# Patient Record
Sex: MALE | Race: AMERICAN INDIAN OR ALASKA NATIVE | NOT HISPANIC OR LATINO | Employment: OTHER | URBAN - METROPOLITAN AREA
[De-identification: names, ages, dates, MRNs, and addresses within clinical notes are randomized per-mention and may not be internally consistent; named-entity substitution may affect disease eponyms.]

---

## 2017-06-23 ENCOUNTER — APPOINTMENT (OUTPATIENT)
Dept: RADIOLOGY | Facility: MEDICAL CENTER | Age: 77
DRG: 163 | End: 2017-06-23
Attending: INTERNAL MEDICINE
Payer: MEDICARE

## 2017-06-23 ENCOUNTER — HOSPITAL ENCOUNTER (OUTPATIENT)
Dept: RADIOLOGY | Facility: MEDICAL CENTER | Age: 77
End: 2017-06-23

## 2017-06-23 ENCOUNTER — RESOLUTE PROFESSIONAL BILLING HOSPITAL PROF FEE (OUTPATIENT)
Dept: HOSPITALIST | Facility: MEDICAL CENTER | Age: 77
End: 2017-06-23
Payer: MEDICARE

## 2017-06-23 ENCOUNTER — HOSPITAL ENCOUNTER (INPATIENT)
Facility: MEDICAL CENTER | Age: 77
LOS: 19 days | DRG: 163 | End: 2017-07-12
Attending: EMERGENCY MEDICINE | Admitting: HOSPITALIST
Payer: MEDICARE

## 2017-06-23 ENCOUNTER — APPOINTMENT (OUTPATIENT)
Dept: RADIOLOGY | Facility: MEDICAL CENTER | Age: 77
DRG: 163 | End: 2017-06-23
Attending: EMERGENCY MEDICINE
Payer: MEDICARE

## 2017-06-23 DIAGNOSIS — S01.81XA FACIAL LACERATION, INITIAL ENCOUNTER: ICD-10-CM

## 2017-06-23 DIAGNOSIS — R94.30 ABNORMAL EXERCISE TOLERANCE TEST: ICD-10-CM

## 2017-06-23 DIAGNOSIS — J96.01 ACUTE RESPIRATORY FAILURE WITH HYPOXIA (HCC): ICD-10-CM

## 2017-06-23 DIAGNOSIS — F10.921 ACUTE ALCOHOL INTOXICATION, WITH DELIRIUM (HCC): ICD-10-CM

## 2017-06-23 DIAGNOSIS — J96.00 ACUTE RESPIRATORY FAILURE, UNSPECIFIED WHETHER WITH HYPOXIA OR HYPERCAPNIA (HCC): ICD-10-CM

## 2017-06-23 DIAGNOSIS — J69.0 ASPIRATION PNEUMONITIS (HCC): ICD-10-CM

## 2017-06-23 DIAGNOSIS — E11.9 TYPE 2 DIABETES MELLITUS WITHOUT COMPLICATION, UNSPECIFIED LONG TERM INSULIN USE STATUS: ICD-10-CM

## 2017-06-23 DIAGNOSIS — G93.40 ENCEPHALOPATHY ACUTE: ICD-10-CM

## 2017-06-23 DIAGNOSIS — G93.40 ENCEPHALOPATHY: ICD-10-CM

## 2017-06-23 PROBLEM — J96.90 RESPIRATORY FAILURE (HCC): Status: ACTIVE | Noted: 2017-06-23

## 2017-06-23 PROBLEM — F10.10 ALCOHOL ABUSE: Status: ACTIVE | Noted: 2017-06-23

## 2017-06-23 LAB
ABO GROUP BLD: NORMAL
ABO GROUP BLD: NORMAL
ALBUMIN SERPL BCP-MCNC: 3.6 G/DL (ref 3.2–4.9)
ALBUMIN/GLOB SERPL: 1.7 G/DL
ALP SERPL-CCNC: 69 U/L (ref 30–99)
ALT SERPL-CCNC: 18 U/L (ref 2–50)
ANION GAP SERPL CALC-SCNC: 13 MMOL/L (ref 0–11.9)
APTT PPP: 26.3 SEC (ref 24.7–36)
AST SERPL-CCNC: 37 U/L (ref 12–45)
BASE EXCESS BLDA CALC-SCNC: -9 MMOL/L (ref -4–3)
BILIRUB SERPL-MCNC: 0.5 MG/DL (ref 0.1–1.5)
BLD GP AB SCN SERPL QL: NORMAL
BODY TEMPERATURE: ABNORMAL DEGREES
BUN SERPL-MCNC: 13 MG/DL (ref 8–22)
CALCIUM SERPL-MCNC: 8.1 MG/DL (ref 8.5–10.5)
CFT BLD TEG: 3.6 MIN (ref 5–10)
CHLORIDE SERPL-SCNC: 104 MMOL/L (ref 96–112)
CLOT ANGLE BLD TEG: 70.2 DEGREES (ref 53–72)
CLOT LYSIS 30M P MA LENFR BLD TEG: 0 % (ref 0–8)
CO2 BLDA-SCNC: 19 MMOL/L (ref 20–33)
CO2 SERPL-SCNC: 16 MMOL/L (ref 20–33)
CREAT SERPL-MCNC: 0.58 MG/DL (ref 0.5–1.4)
CT.EXTRINSIC BLD ROTEM: 1.2 MIN (ref 1–3)
ERYTHROCYTE [DISTWIDTH] IN BLOOD BY AUTOMATED COUNT: 48.7 FL (ref 35.9–50)
ETHANOL BLD-MCNC: 0.07 G/DL
GFR SERPL CREATININE-BSD FRML MDRD: >60 ML/MIN/1.73 M 2
GLOBULIN SER CALC-MCNC: 2.1 G/DL (ref 1.9–3.5)
GLUCOSE BLD-MCNC: 121 MG/DL (ref 65–99)
GLUCOSE BLD-MCNC: 131 MG/DL (ref 65–99)
GLUCOSE BLD-MCNC: 136 MG/DL (ref 65–99)
GLUCOSE SERPL-MCNC: 134 MG/DL (ref 65–99)
GRAM STN SPEC: NORMAL
HCO3 BLDA-SCNC: 17.6 MMOL/L (ref 17–25)
HCT VFR BLD AUTO: 33.2 % (ref 42–52)
HGB BLD-MCNC: 11.1 G/DL (ref 14–18)
INR PPP: 1.08 (ref 0.87–1.13)
LACTATE BLD-SCNC: 2.2 MMOL/L (ref 0.5–2)
LACTATE BLD-SCNC: 2.3 MMOL/L (ref 0.5–2)
LACTATE BLD-SCNC: 2.7 MMOL/L (ref 0.5–2)
LACTATE BLD-SCNC: 2.8 MMOL/L (ref 0.5–2)
LACTATE BLD-SCNC: 3.6 MMOL/L (ref 0.5–2)
MAGNESIUM SERPL-MCNC: 1.5 MG/DL (ref 1.5–2.5)
MCF BLD TEG: 70.4 MM (ref 50–70)
MCH RBC QN AUTO: 33.4 PG (ref 27–33)
MCHC RBC AUTO-ENTMCNC: 33.4 G/DL (ref 33.7–35.3)
MCV RBC AUTO: 100 FL (ref 81.4–97.8)
O2/TOTAL GAS SETTING VFR VENT: 40 %
PA AA BLD-ACNC: 94 %
PA ADP BLD-ACNC: 99.8 %
PCO2 BLDA: 39 MMHG (ref 26–37)
PH BLDA: 7.26 [PH] (ref 7.4–7.5)
PHOSPHATE SERPL-MCNC: 2.6 MG/DL (ref 2.5–4.5)
PLATELET # BLD AUTO: 162 K/UL (ref 164–446)
PMV BLD AUTO: 10.1 FL (ref 9–12.9)
PO2 BLDA: 106 MMHG (ref 64–87)
POTASSIUM SERPL-SCNC: 3.8 MMOL/L (ref 3.6–5.5)
PROCALCITONIN SERPL-MCNC: 0.33 NG/ML
PROT SERPL-MCNC: 5.7 G/DL (ref 6–8.2)
PROTHROMBIN TIME: 14.3 SEC (ref 12–14.6)
RBC # BLD AUTO: 3.32 M/UL (ref 4.7–6.1)
RH BLD: NORMAL
RHODAMINE-AURAMINE STN SPEC: NORMAL
SAO2 % BLDA: 97 % (ref 93–99)
SIGNIFICANT IND 70042: NORMAL
SIGNIFICANT IND 70042: NORMAL
SITE SITE: NORMAL
SITE SITE: NORMAL
SODIUM SERPL-SCNC: 133 MMOL/L (ref 135–145)
SOURCE SOURCE: NORMAL
SOURCE SOURCE: NORMAL
SPECIMEN DRAWN FROM PATIENT: ABNORMAL
TEG ALGORITHM TGALG: ABNORMAL
WBC # BLD AUTO: 20.8 K/UL (ref 4.8–10.8)

## 2017-06-23 PROCEDURE — 94640 AIRWAY INHALATION TREATMENT: CPT

## 2017-06-23 PROCEDURE — 87015 SPECIMEN INFECT AGNT CONCNTJ: CPT

## 2017-06-23 PROCEDURE — 5A1945Z RESPIRATORY VENTILATION, 24-96 CONSECUTIVE HOURS: ICD-10-PCS | Performed by: HOSPITALIST

## 2017-06-23 PROCEDURE — 99291 CRITICAL CARE FIRST HOUR: CPT | Mod: 25 | Performed by: INTERNAL MEDICINE

## 2017-06-23 PROCEDURE — 87205 SMEAR GRAM STAIN: CPT

## 2017-06-23 PROCEDURE — 700111 HCHG RX REV CODE 636 W/ 250 OVERRIDE (IP): Performed by: HOSPITALIST

## 2017-06-23 PROCEDURE — 85384 FIBRINOGEN ACTIVITY: CPT

## 2017-06-23 PROCEDURE — 86850 RBC ANTIBODY SCREEN: CPT

## 2017-06-23 PROCEDURE — 700105 HCHG RX REV CODE 258: Performed by: HOSPITALIST

## 2017-06-23 PROCEDURE — 700101 HCHG RX REV CODE 250: Performed by: INTERNAL MEDICINE

## 2017-06-23 PROCEDURE — 700111 HCHG RX REV CODE 636 W/ 250 OVERRIDE (IP): Performed by: INTERNAL MEDICINE

## 2017-06-23 PROCEDURE — 88112 CYTOPATH CELL ENHANCE TECH: CPT

## 2017-06-23 PROCEDURE — 99291 CRITICAL CARE FIRST HOUR: CPT

## 2017-06-23 PROCEDURE — HZ2ZZZZ DETOXIFICATION SERVICES FOR SUBSTANCE ABUSE TREATMENT: ICD-10-PCS | Performed by: HOSPITALIST

## 2017-06-23 PROCEDURE — 87206 SMEAR FLUORESCENT/ACID STAI: CPT

## 2017-06-23 PROCEDURE — 85730 THROMBOPLASTIN TIME PARTIAL: CPT

## 2017-06-23 PROCEDURE — 700111 HCHG RX REV CODE 636 W/ 250 OVERRIDE (IP): Performed by: EMERGENCY MEDICINE

## 2017-06-23 PROCEDURE — 82803 BLOOD GASES ANY COMBINATION: CPT

## 2017-06-23 PROCEDURE — 96365 THER/PROPH/DIAG IV INF INIT: CPT

## 2017-06-23 PROCEDURE — 85027 COMPLETE CBC AUTOMATED: CPT

## 2017-06-23 PROCEDURE — 82962 GLUCOSE BLOOD TEST: CPT

## 2017-06-23 PROCEDURE — 85347 COAGULATION TIME ACTIVATED: CPT

## 2017-06-23 PROCEDURE — 85610 PROTHROMBIN TIME: CPT

## 2017-06-23 PROCEDURE — 3E1F88Z IRRIGATION OF RESPIRATORY TRACT USING IRRIGATING SUBSTANCE, VIA NATURAL OR ARTIFICIAL OPENING ENDOSCOPIC: ICD-10-PCS | Performed by: INTERNAL MEDICINE

## 2017-06-23 PROCEDURE — 87186 SC STD MICRODIL/AGAR DIL: CPT

## 2017-06-23 PROCEDURE — 36415 COLL VENOUS BLD VENIPUNCTURE: CPT

## 2017-06-23 PROCEDURE — 87116 MYCOBACTERIA CULTURE: CPT

## 2017-06-23 PROCEDURE — 85576 BLOOD PLATELET AGGREGATION: CPT

## 2017-06-23 PROCEDURE — 700102 HCHG RX REV CODE 250 W/ 637 OVERRIDE(OP): Performed by: INTERNAL MEDICINE

## 2017-06-23 PROCEDURE — 87077 CULTURE AEROBIC IDENTIFY: CPT | Mod: 91

## 2017-06-23 PROCEDURE — 80307 DRUG TEST PRSMV CHEM ANLYZR: CPT

## 2017-06-23 PROCEDURE — 84100 ASSAY OF PHOSPHORUS: CPT

## 2017-06-23 PROCEDURE — 99292 CRITICAL CARE ADDL 30 MIN: CPT | Mod: 25 | Performed by: INTERNAL MEDICINE

## 2017-06-23 PROCEDURE — A9270 NON-COVERED ITEM OR SERVICE: HCPCS | Performed by: INTERNAL MEDICINE

## 2017-06-23 PROCEDURE — 86901 BLOOD TYPING SEROLOGIC RH(D): CPT

## 2017-06-23 PROCEDURE — 71010 DX-CHEST-LIMITED (1 VIEW): CPT

## 2017-06-23 PROCEDURE — 770022 HCHG ROOM/CARE - ICU (200)

## 2017-06-23 PROCEDURE — 83735 ASSAY OF MAGNESIUM: CPT

## 2017-06-23 PROCEDURE — 31645 BRNCHSC W/THER ASPIR 1ST: CPT | Performed by: INTERNAL MEDICINE

## 2017-06-23 PROCEDURE — 700102 HCHG RX REV CODE 250 W/ 637 OVERRIDE(OP): Performed by: HOSPITALIST

## 2017-06-23 PROCEDURE — 302978 HCHG BRONCHOSCOPY-DIAGNOSTIC

## 2017-06-23 PROCEDURE — 86900 BLOOD TYPING SEROLOGIC ABO: CPT

## 2017-06-23 PROCEDURE — 99291 CRITICAL CARE FIRST HOUR: CPT | Performed by: HOSPITALIST

## 2017-06-23 PROCEDURE — 87102 FUNGUS ISOLATION CULTURE: CPT

## 2017-06-23 PROCEDURE — 84145 PROCALCITONIN (PCT): CPT

## 2017-06-23 PROCEDURE — 94002 VENT MGMT INPAT INIT DAY: CPT

## 2017-06-23 PROCEDURE — 83605 ASSAY OF LACTIC ACID: CPT | Mod: 91

## 2017-06-23 PROCEDURE — 96366 THER/PROPH/DIAG IV INF ADDON: CPT

## 2017-06-23 PROCEDURE — 80053 COMPREHEN METABOLIC PANEL: CPT

## 2017-06-23 PROCEDURE — 87070 CULTURE OTHR SPECIMN AEROBIC: CPT

## 2017-06-23 PROCEDURE — G0390 TRAUMA RESPONS W/HOSP CRITI: HCPCS

## 2017-06-23 PROCEDURE — 88305 TISSUE EXAM BY PATHOLOGIST: CPT

## 2017-06-23 PROCEDURE — A9270 NON-COVERED ITEM OR SERVICE: HCPCS | Performed by: HOSPITALIST

## 2017-06-23 PROCEDURE — 96368 THER/DIAG CONCURRENT INF: CPT

## 2017-06-23 PROCEDURE — 94760 N-INVAS EAR/PLS OXIMETRY 1: CPT

## 2017-06-23 RX ORDER — FOLIC ACID 1 MG/1
1 TABLET ORAL DAILY
Status: DISCONTINUED | OUTPATIENT
Start: 2017-06-23 | End: 2017-07-12 | Stop reason: HOSPADM

## 2017-06-23 RX ORDER — SIMVASTATIN 40 MG
40 TABLET ORAL EVERY EVENING
COMMUNITY

## 2017-06-23 RX ORDER — IPRATROPIUM BROMIDE AND ALBUTEROL SULFATE 2.5; .5 MG/3ML; MG/3ML
3 SOLUTION RESPIRATORY (INHALATION)
Status: DISCONTINUED | OUTPATIENT
Start: 2017-06-23 | End: 2017-06-24

## 2017-06-23 RX ORDER — ASPIRIN 81 MG/1
81 TABLET, CHEWABLE ORAL 2 TIMES DAILY
COMMUNITY

## 2017-06-23 RX ORDER — SODIUM CHLORIDE 9 MG/ML
30 INJECTION, SOLUTION INTRAVENOUS
Status: COMPLETED | OUTPATIENT
Start: 2017-06-23 | End: 2017-06-23

## 2017-06-23 RX ORDER — TAMSULOSIN HYDROCHLORIDE 0.4 MG/1
0.4 CAPSULE ORAL
COMMUNITY

## 2017-06-23 RX ORDER — SODIUM CHLORIDE 9 MG/ML
INJECTION, SOLUTION INTRAVENOUS CONTINUOUS
Status: DISCONTINUED | OUTPATIENT
Start: 2017-06-23 | End: 2017-06-27

## 2017-06-23 RX ORDER — MAGNESIUM SULFATE HEPTAHYDRATE 40 MG/ML
4 INJECTION, SOLUTION INTRAVENOUS ONCE
Status: COMPLETED | OUTPATIENT
Start: 2017-06-23 | End: 2017-06-23

## 2017-06-23 RX ORDER — BISACODYL 10 MG
10 SUPPOSITORY, RECTAL RECTAL
Status: DISCONTINUED | OUTPATIENT
Start: 2017-06-23 | End: 2017-07-12 | Stop reason: HOSPADM

## 2017-06-23 RX ORDER — BISACODYL 10 MG
10 SUPPOSITORY, RECTAL RECTAL
Status: DISCONTINUED | OUTPATIENT
Start: 2017-06-23 | End: 2017-06-23

## 2017-06-23 RX ORDER — THIAMINE MONONITRATE (VIT B1) 100 MG
100 TABLET ORAL DAILY
Status: DISCONTINUED | OUTPATIENT
Start: 2017-06-23 | End: 2017-07-12 | Stop reason: HOSPADM

## 2017-06-23 RX ORDER — POLYETHYLENE GLYCOL 3350 17 G/17G
1 POWDER, FOR SOLUTION ORAL
Status: DISCONTINUED | OUTPATIENT
Start: 2017-06-23 | End: 2017-07-12 | Stop reason: HOSPADM

## 2017-06-23 RX ORDER — PANTOPRAZOLE SODIUM 40 MG/1
40 TABLET, DELAYED RELEASE ORAL DAILY
COMMUNITY

## 2017-06-23 RX ORDER — CHLORHEXIDINE GLUCONATE ORAL RINSE 1.2 MG/ML
15 SOLUTION DENTAL 2 TIMES DAILY
Status: DISCONTINUED | OUTPATIENT
Start: 2017-06-23 | End: 2017-07-03

## 2017-06-23 RX ORDER — AMOXICILLIN 250 MG
2 CAPSULE ORAL 2 TIMES DAILY
Status: DISCONTINUED | OUTPATIENT
Start: 2017-06-23 | End: 2017-06-23

## 2017-06-23 RX ORDER — AMOXICILLIN 250 MG
2 CAPSULE ORAL 2 TIMES DAILY
Status: DISCONTINUED | OUTPATIENT
Start: 2017-06-23 | End: 2017-07-12 | Stop reason: HOSPADM

## 2017-06-23 RX ORDER — LIDOCAINE HYDROCHLORIDE 10 MG/ML
1-2 INJECTION, SOLUTION INFILTRATION; PERINEURAL
Status: DISCONTINUED | OUTPATIENT
Start: 2017-06-23 | End: 2017-07-04

## 2017-06-23 RX ORDER — BACILLUS COAGULANS/LACTASE 500MM-3000
CAPSULE ORAL
COMMUNITY

## 2017-06-23 RX ORDER — SODIUM CHLORIDE 9 MG/ML
500 INJECTION, SOLUTION INTRAVENOUS
Status: COMPLETED | OUTPATIENT
Start: 2017-06-23 | End: 2017-06-23

## 2017-06-23 RX ORDER — FINASTERIDE 5 MG/1
5 TABLET, FILM COATED ORAL DAILY
COMMUNITY

## 2017-06-23 RX ORDER — FAMOTIDINE 20 MG/1
20 TABLET, FILM COATED ORAL EVERY 12 HOURS
Status: DISCONTINUED | OUTPATIENT
Start: 2017-06-23 | End: 2017-06-26

## 2017-06-23 RX ORDER — QUININE SULFATE 324 MG/1
324 CAPSULE ORAL EVERY EVENING
COMMUNITY

## 2017-06-23 RX ORDER — DEXTROSE MONOHYDRATE 25 G/50ML
25 INJECTION, SOLUTION INTRAVENOUS
Status: DISCONTINUED | OUTPATIENT
Start: 2017-06-23 | End: 2017-07-05

## 2017-06-23 RX ORDER — METFORMIN HYDROCHLORIDE 500 MG/1
500 TABLET, EXTENDED RELEASE ORAL DAILY
COMMUNITY

## 2017-06-23 RX ORDER — GLIPIZIDE 2.5 MG/1
2.5 TABLET, EXTENDED RELEASE ORAL DAILY
COMMUNITY

## 2017-06-23 RX ORDER — POLYETHYLENE GLYCOL 3350 17 G/17G
1 POWDER, FOR SOLUTION ORAL
Status: DISCONTINUED | OUTPATIENT
Start: 2017-06-23 | End: 2017-06-23

## 2017-06-23 RX ORDER — SODIUM PHOSPHATE,MONO-DIBASIC 19G-7G/118
500 ENEMA (ML) RECTAL
COMMUNITY

## 2017-06-23 RX ADMIN — FAMOTIDINE 20 MG: 20 TABLET, FILM COATED ORAL at 21:12

## 2017-06-23 RX ADMIN — AMPICILLIN SODIUM AND SULBACTAM SODIUM 3 G: 2; 1 INJECTION, POWDER, FOR SOLUTION INTRAMUSCULAR; INTRAVENOUS at 18:14

## 2017-06-23 RX ADMIN — SODIUM CHLORIDE 2508 ML: 9 INJECTION, SOLUTION INTRAVENOUS at 07:36

## 2017-06-23 RX ADMIN — IPRATROPIUM BROMIDE AND ALBUTEROL SULFATE 3 ML: .5; 3 SOLUTION RESPIRATORY (INHALATION) at 13:41

## 2017-06-23 RX ADMIN — PROPOFOL 50 MCG/KG/MIN: 10 INJECTION, EMULSION INTRAVENOUS at 15:21

## 2017-06-23 RX ADMIN — ENOXAPARIN SODIUM 40 MG: 100 INJECTION SUBCUTANEOUS at 08:09

## 2017-06-23 RX ADMIN — IPRATROPIUM BROMIDE AND ALBUTEROL SULFATE 3 ML: .5; 3 SOLUTION RESPIRATORY (INHALATION) at 22:26

## 2017-06-23 RX ADMIN — PROPOFOL 60 MCG/KG/MIN: 10 INJECTION, EMULSION INTRAVENOUS at 10:58

## 2017-06-23 RX ADMIN — AMPICILLIN SODIUM AND SULBACTAM SODIUM 3 G: 2; 1 INJECTION, POWDER, FOR SOLUTION INTRAMUSCULAR; INTRAVENOUS at 12:32

## 2017-06-23 RX ADMIN — THERA TABS 1 TABLET: TAB at 10:58

## 2017-06-23 RX ADMIN — PROPOFOL 15 MCG/KG/MIN: 10 INJECTION, EMULSION INTRAVENOUS at 04:18

## 2017-06-23 RX ADMIN — PROPOFOL 35 MCG/KG/MIN: 10 INJECTION, EMULSION INTRAVENOUS at 08:01

## 2017-06-23 RX ADMIN — SODIUM CHLORIDE: 9 INJECTION, SOLUTION INTRAVENOUS at 06:09

## 2017-06-23 RX ADMIN — IPRATROPIUM BROMIDE AND ALBUTEROL SULFATE 3 ML: .5; 3 SOLUTION RESPIRATORY (INHALATION) at 18:53

## 2017-06-23 RX ADMIN — SENNOSIDES AND DOCUSATE SODIUM 2 TABLET: 8.6; 5 TABLET ORAL at 21:12

## 2017-06-23 RX ADMIN — IPRATROPIUM BROMIDE AND ALBUTEROL SULFATE 3 ML: .5; 3 SOLUTION RESPIRATORY (INHALATION) at 07:33

## 2017-06-23 RX ADMIN — MAGNESIUM SULFATE HEPTAHYDRATE 4 G: 40 INJECTION, SOLUTION INTRAVENOUS at 13:33

## 2017-06-23 RX ADMIN — FOLIC ACID 1 MG: 1 TABLET ORAL at 10:58

## 2017-06-23 RX ADMIN — AMPICILLIN SODIUM AND SULBACTAM SODIUM 3 G: 2; 1 INJECTION, POWDER, FOR SOLUTION INTRAMUSCULAR; INTRAVENOUS at 23:49

## 2017-06-23 RX ADMIN — AMPICILLIN SODIUM AND SULBACTAM SODIUM 3 G: 2; 1 INJECTION, POWDER, FOR SOLUTION INTRAMUSCULAR; INTRAVENOUS at 06:08

## 2017-06-23 RX ADMIN — CHLORHEXIDINE GLUCONATE 15 ML: 1.2 RINSE ORAL at 08:04

## 2017-06-23 RX ADMIN — Medication 100 MG: at 10:58

## 2017-06-23 RX ADMIN — CHLORHEXIDINE GLUCONATE 15 ML: 1.2 RINSE ORAL at 21:12

## 2017-06-23 RX ADMIN — IPRATROPIUM BROMIDE AND ALBUTEROL SULFATE 3 ML: .5; 3 SOLUTION RESPIRATORY (INHALATION) at 10:35

## 2017-06-23 RX ADMIN — SODIUM CHLORIDE 500 ML: 9 INJECTION, SOLUTION INTRAVENOUS at 10:57

## 2017-06-23 RX ADMIN — FENTANYL CITRATE 25 MCG/HR: 50 INJECTION, SOLUTION INTRAMUSCULAR; INTRAVENOUS at 08:15

## 2017-06-23 RX ADMIN — FAMOTIDINE 20 MG: 10 INJECTION INTRAVENOUS at 08:04

## 2017-06-23 RX ADMIN — PROPOFOL 45 MCG/KG/MIN: 10 INJECTION, EMULSION INTRAVENOUS at 19:36

## 2017-06-23 RX ADMIN — SODIUM CHLORIDE: 9 INJECTION, SOLUTION INTRAVENOUS at 15:22

## 2017-06-23 ASSESSMENT — LIFESTYLE VARIABLES
DO YOU DRINK ALCOHOL: YES
EVER_SMOKED: YES
REASON UNABLE TO ASSESS: CURRENTLY INTUBATED

## 2017-06-23 ASSESSMENT — PATIENT HEALTH QUESTIONNAIRE - PHQ9
1. LITTLE INTEREST OR PLEASURE IN DOING THINGS: NOT AT ALL
SUM OF ALL RESPONSES TO PHQ QUESTIONS 1-9: 0
2. FEELING DOWN, DEPRESSED, IRRITABLE, OR HOPELESS: NOT AT ALL
SUM OF ALL RESPONSES TO PHQ9 QUESTIONS 1 AND 2: 0

## 2017-06-23 NOTE — IP AVS SNAPSHOT
BitX Access Code: Activation code not generated  Current BitX Status: Active    [employer]  A secure, online tool to manage your health information     [employer]’s BitX® is a secure, online tool that connects you to your personalized health information from the privacy of your home -- day or night - making it very easy for you to manage your healthcare. Once the activation process is completed, you can even access your medical information using the BitX abby, which is available for free in the Apple Abby store or Google Play store.     BitX provides the following levels of access (as shown below):   My Chart Features   [facility] Primary Care Doctor [facility]  Specialists [facility]  Urgent  Care Non-[facility]  Primary Care  Doctor   Email your healthcare team securely and privately 24/7 X X X X   Manage appointments: schedule your next appointment; view details of past/upcoming appointments X      Request prescription refills. X      View recent personal medical records, including lab and immunizations X X X X   View health record, including health history, allergies, medications X X X X   Read reports about your outpatient visits, procedures, consult and ER notes X X X X   See your discharge summary, which is a recap of your hospital and/or ER visit that includes your diagnosis, lab results, and care plan. X X       How to register for BitX:  1. Go to  [email or web address].  2. Click on the Sign Up Now box, which takes you to the New Member Sign Up page. You will need to provide the following information:  a. Enter your BitX Access Code exactly as it appears at the top of this page. (You will not need to use this code after you’ve completed the sign-up process. If you do not sign up before the expiration date, you must request a new code.)   b. Enter your date of birth.   c. Enter your home email address.   d. Click Submit, and follow the next screen’s instructions.  3. Create a BitX ID. This will  be your [employer] login ID and cannot be changed, so think of one that is secure and easy to remember.  4. Create a [employer]agility password. You can change your password at any time.  5. Enter your Password Reset Question and Answer. This can be used at a later time if you forget your password.   6. Enter your e-mail address. This allows you to receive e-mail notifications when new information is available in [employer].  7. Click Sign Up. You can now view your health information.    For assistance activating your Pathagility account, call [phone]

## 2017-06-23 NOTE — ED NOTES
Broncoscopy performed by Dr. Simmons with respiratory and RN at bedside.  Propofol infusing at 45 mcg/kg/min.  VSS at this time.

## 2017-06-23 NOTE — CONSULTS
"Trauma Consult  6/23/2017    Attending Physician: Devang Gaspar MD.     CC: Trauma The patient was triaged as a Trauma Red Transfer in accordance with established pre hospital protols. An expeditious primary and secondary survey with required adjuncts was conducted. See Trauma Narrator for full details.    HPI: This is a 78 yo male who apparently tripped and fell while drunk earlier tonight, sustaining a lip laceration. He was taken to a local hospital in [place], [place] and had a negative CT head and neck. The plan was apparently to repair his lip laceration and send him home but he apparently aspirated blood while supine and intoxicated in the ER getting his lip sutured. He was intubated and [place] [facility] [place] Stable vitals en route.     PMHx, PSHx, outpatient meds, social hx, ROS, family hx all unobtainable due to his intubated state.           Physical Exam:  Blood pressure 130/62, pulse 90, resp. rate 22, height 1.803 m (5' 11\"), weight 83.6 kg (184 lb 4.9 oz), SpO2 99 %.    Constitutional: Intubated, sedated. Responds to painful stimuli with localization. GCS 5T while on propofol. E1 V1T M3  Head: No cephalohematoma. Pupils 4-3 reactive bilaterally. Midface stable. No malocclusion.  TMs clear bilaterally. Sutured lip laceration.  Neck: No tracheal deviation. No midline cervical spine tenderness. C-collar in place. No cervical seatbelt sign.  Cardiovascular: Normal rate, regular rhythm, normal heart sounds and intact distal pulses.  Exam reveals no gallop and no friction rub.  No murmur heard.  Pulmonary/Chest: Clavicles nontender to palpation. There is not any chest wall tenderness bilaterally.  No crepitus. Positive breath sounds bilaterally.   Abdominal: Soft, nondistended. Nontender to palpation. Pelvis is stable to anterior-posterior compression. No abdominal seatbelt sign.   Musculoskeletal: Right upper extremity grossly atraumatic, palpable radial pulse.   Left upper extremity grossly " "atraumatic, palpable radial pulse.   Right lower extremity grossly atraumatic.   Left  lower extremity grossly atraumatic.    Back: Midline thoracic and lumbar spines are nontender to palpation. No step-offs. Mild sacral erythema present.  : Normal male external genitalia. Rectal exam not done. No blood visible at urethral meatus. Herring in place.  Neurological: Unable to assess intubated.   Skin: Skin is warm and dry.  No diaphoresis. No erythema. No pallor.   Psychiatric:  Unable to assess        Labs:                    Radiology:  DX-CHEST-LIMITED (1 VIEW)   Preliminary Result      OUTSIDE IMAGES-CT CERVICAL SPINE   Preliminary Result      OUTSIDE IMAGES-CT FACE   Preliminary Result      OUTSIDE IMAGES-CT HEAD   Preliminary Result      OUTSIDE IMAGES-DX CHEST   Preliminary Result      OUTSIDE IMAGES-DX CHEST   Preliminary Result            Assessment: This is a 76 yo male with no apparent major injures who aspirated blood.    Plan: Medical admission.   I reviewed his imaging from referring. His C collar may be removed and no further work up is indicated for the CT findings of a questionable \"T1 wedging\"         Time spent: 35 minutes    Devang Gaspar MD  [facility]  [phone]      "

## 2017-06-23 NOTE — PROGRESS NOTES
Pulmonary Critical Care Progress Note        Date of service: 6/23/2017    Chief Complaint: GLF and ETOH    History of Present Illness:   This is a 77-year-old male admitted to the ICU through the ED on 6/23/17 for acute alcohol intoxication and ground level fall. Patient intubated in the ED.       ROS:  Respiratory: unable to perform due to the patient's inability to effectively communicate, Cardiac: unable to perform due to the patient's inability to effectively communicate, GI: unable to perform due to the patient's inability to effectively communicate.  All other systems negative.      Interval Events:  24 hour interval history reviewed   Admitted overnight.   Follows commands and moves all extremities.   Pupils are equal and sluggish. On Prop and Fent gtts.   SR,  systolic   225 cc UOP last 2 hours   CXR shows hyperexpanded lungs with clear lung fields.       PFSH:  No change.      Physical Exam  General:  Sedate, arouses, int agitated,   Neuro/Psych: moves all ext; int following  HEENT: PERRL, MMM  CVS: RRR  Respiratory: Coarse, no wheeze  Abdomen/:soft, nt; carlos TF  Extremities: no CCE  Skin: cool, dry, no rash      Respiratory:  [place]al Vent Mode: APVCMV, Rate (breaths/min): 20, Vt Target (mL): 460, PEEP/CPAP: 8, FiO2: 40, Static Compliance (ml / cm H2O): 43, Control VTE (exp VT): 459  Pulse Oximetry: 99 %  ImagingAvailable data reviewed     Recent Labs      06/23/17   0423   ISTATAPH  7.264*   ISTATAPCO2  39.0*   ISTATAPO2  106*   ISTATATCO2  19*   [id number]  97   ISTATARTHCO3  17.6   ISTATARTBE  -9*   ISTATTEMP  see below   ISTATFIO2  40   ISTATSPEC  Arterial       HemoDynamics:  Pulse: 76, Heart Rate (Monitored): 76  Blood Pressure : 130/62 mmHg, NIBP: 100/52 mmHg     Imaging: Available data reviewed      Neuro:  GCS Total [place] Coma Score: 7  Imaging: Available data reviewed      Fluids:  Intake/Output       06/21/17 0700 - 06/22/17 0659 (Not Admitted) 06/22/17 0700 - 06/23/17 0659 (Not  Admitted) 17 07 - 17 0659      1900-0659 Total [id number] Total  Total       Intake    I.V.  --  -- --  --  20006.8  -- .8    Pre-Hospital Volume -- -- -- -- 2000 -- -- --    Trauma Resuscitation Volume -- -- -- -- 0 0 -- -- --    Propofol Volume -- -- -- -- -- -- 66.8 -- 66.8    IV Volume (Normal saline) -- -- -- -- -- -- 2000    Blood  --  -- --  --  0 0  --  -- --    PRBC Total Volume (Non-Barcoded) -- -- -- -- 0 0 -- -- --    FFP Total Volume (Non-Barcoded) -- -- -- -- 0 0 -- -- --    Platelets Total Volume (Non-Barcoded) -- -- -- -- 0 0 -- -- --    Cryoprecipitate (Pooled) Total Volume (Non-Barcoded) -- -- -- -- 0 0 -- -- --    Cryoprecipitate (Single) Total Volume (Non-Barcoded) -- -- -- -- 0 0 -- -- --    Total Intake -- -- -- -- 2000.8 -- .8       Output    Urine  --  -- --  --  -- --  225  -- 225    Indwelling Cathether -- -- -- -- -- -- 225 -- 225    Other  --  -- --  --  0 0  --  -- --    Pre-Hospital Output -- -- -- -- 0 0 -- -- --    Trauma Resuscitation Output -- -- -- -- 0 0 -- -- --    Blood  --  -- --  --  0 0  --  -- --    Est. Blood Loss (mL) -- -- -- -- 0 0 -- -- --    Total Output -- -- -- -- 0 0 225 -- 225       Net I/O     -- -- -- -- 2000 1841.8 -- 1841.8        Weight: 83.6 kg (184 lb 4.9 oz)  Recent Labs      17   SODIUM  133*   POTASSIUM  3.8   CHLORIDE  104   CO2  16*   BUN  13   CREATININE  0.58   CALCIUM  8.1*       GI/Nutrition:  Imaging: Available data reviewed  NPO       Liver Function  Recent Labs      17   ALTSGPT  18   ASTSGOT  37   ALKPHOSPHAT  69   TBILIRUBIN  0.5   GLUCOSE  134*       Heme:  Recent Labs      17   RBC  3.32*   HEMOGLOBIN  11.1*   HEMATOCRIT  33.2*   PLATELETCT  162*   PROTHROMBTM  14.3   APTT  26.3   INR  1.08       Infectious Disease:  Temp  Av.4 °C (97.6 °F)  Min: 36.2 °C (97.2 °F)  Max: 36.7 °C (98 °F)  Micro: reviewed      Recent Labs      06/23/17   0421   WBC  20.8*   ASTSGOT  37   ALTSGPT  18   ALKPHOSPHAT  69   TBILIRUBIN  0.5     Current Facility-Administered Medications   Medication Dose Frequency Provider Last Rate Last Dose   • insulin lispro (HUMALOG) injection 2-9 Units  2-9 Units Q6HRS Tunde Panchal M.D.   Stopped at 06/23/17 0802   • NS (BOLUS) infusion 500 mL  500 mL Once PRN Tunde Panchal M.D.       • NS infusion   Continuous Tunde Panchal M.D. 100 mL/hr at 06/23/17 0609     • ampicillin/sulbactam (UNASYN) 3 g in  mL IVPB  3 g Q6HRS Tunde Panchal M.D.   Stopped at 06/23/17 0638   • glucose 4 g chewable tablet 16 g  16 g Q15 MIN PRN Kaya Gonzalez, PHARMD       • dextrose 50% (D50W) injection 25 mL  25 mL Q15 MIN PRN Kaya Gonzalez, PHARMD       • thiamine (THIAMINE) tablet 100 mg  100 mg DAILY Tunde Panchal M.D.       • folic acid (FOLVITE) tablet 1 mg  1 mg DAILY Tudne Panchal M.D.       • multivitamin (THERAGRAN) tablet 1 Tab  1 Tab DAILY Pedro Vivas M.D.       • Respiratory Care per Protocol   Continuous RT Pedro Vivas M.D.       • senna-docusate (PERICOLACE or SENOKOT S) 8.6-50 MG per tablet 2 Tab  2 Tab BID Pedro Vivas M.D.        And   • polyethylene glycol/lytes (MIRALAX) PACKET 1 Packet  1 Packet QDAY PRN Pedro Vivas M.D.        And   • magnesium hydroxide (MILK OF MAGNESIA) suspension 30 mL  30 mL QDAY PRN Pedro Vivas M.D.        And   • bisacodyl (DULCOLAX) suppository 10 mg  10 mg QDAY PRN Pedro Vivas M.D.       • chlorhexidine (PERIDEX) 0.12 % solution 15 mL  15 mL BID Pedro Vivas M.D.   15 mL at 06/23/17 0804   • lidocaine (XYLOCAINE) 1%  injection  1-2 mL Q30 MIN PRN Pedro Vivas M.D.       • MD ALERT...Adult ICU Electrolyte Replacement per Pharmacy Protocol   pharmacy to dose Pedro Vivas M.D.       • enoxaparin (LOVENOX) inj 40 mg  40 mg DAILY Pedro Vivas M.D.   40 mg at  06/23/17 0809   • fentaNYL (SUBLIMAZE) injection 25 mcg  25 mcg Q HOUR PRN Pedro Vivas M.D.        Or   • fentaNYL (SUBLIMAZE) injection 50 mcg  50 mcg Q HOUR PRN Pedro Vivas M.D.        Or   • fentaNYL (SUBLIMAZE) injection 100 mcg  100 mcg Q HOUR PRN Pedro Vivas M.D.       • fentaNYL (SUBLIMAZE) 50 mcg/mL in 50mL  0-200 mcg/hr Continuous Pedro Vivas M.D. 1 mL/hr at 06/23/17 0831 50 mcg/hr at 06/23/17 0831   • ipratropium-albuterol (DUONEB) nebulizer solution 3 mL  3 mL Q2HRS PRN (RT) Pedro Vivas M.D.       • ipratropium-albuterol (DUONEB) nebulizer solution 3 mL  3 mL Q4HRS (RT) Pedro Vivas M.D.   3 mL at 06/23/17 0733   • famotidine (PEPCID) tablet 20 mg  20 mg Q12HRS Pedro Vivas M.D.        Or   • famotidine (PEPCID) injection 20 mg  20 mg Q12HRS Pedro Vivas M.D.   20 mg at 06/23/17 0804   • propofol (DIPRIVAN) injection  5-80 mcg/kg/min Continuous Pedro Vivas M.D. 30.1 mL/hr at 06/23/17 0917 60 mcg/kg/min at 06/23/17 0917     Last reviewed on 6/23/2017  5:03 AM by Jessica Artis R.N.    Quality  Measures:  Labs reviewed, Medications reviewed and Radiology images reviewed                      Assessment and Plan:  Ventilator-dependent respiratory failure.  Status post ground level fall with facial trauma   - 3 cm laceration to his lip, which was sutured in [place], [place]   - seen by trauma and cleared   Status post aspiration.   - empiric abx; f/u cxr  Acute alcohol intoxication with blood alcohol level of 0.206 in [place] at the time of presentation.   - sedation; monitor for w/d   - follow/correct lytes   - rally vits  Alcohol abuse.  Acute blood loss anemia.   - follow HH  Hyponatremia.  Incomplete historical database secondary to inability to interview the patient.  Presumed chronic lung disease based upon his medication list.  Presumed dyslipidemia.  Presumed diabetes mellitus   - glycemic  control      Discussed patient condition and risk of morbidity and/or mortality with RN, RT, Pharmacy and Charge nurse / hot rounds.    The patient remains critically ill.  Critical care time = 39 minutes in directly providing and coordinating critical care and extensive data review.  No time overlap and excludes procedures.      Caremn SOTOMAYOR (Alison), am scribing for, and in the presence of, Gunnar Bryan M.D.  Electronically signed by: Carmen Maciel (Alison), 6/23/2017  Gunnar SOTOMAYOR M.D. personally performed the services described in this documentation, as scribed by Carmen Maciel in my presence, and it is both accurate and complete.

## 2017-06-23 NOTE — PROGRESS NOTES
Assumed care of patient, bedside report completed. Two RN skin check completed. Patient brought up in C-spine precautions in place, will call MD Gaspar regarding questions about what kind of spinal precautions to keep patient on.

## 2017-06-23 NOTE — CARE PLAN
Problem: Bronchoconstriction:  Goal: Improve in air movement and diminished wheezing  Outcome: PROGRESSING SLOWER THAN EXPECTED  Pt on Q4 TX's.    Problem: Ventilation Defect:  Goal: Ability to achieve and maintain unassisted ventilation or tolerate decreased levels of ventilator support  Outcome: PROGRESSING SLOWER THAN EXPECTED  Adult Ventilation Update    Total Vent Days: 1      Patient Lines/Drains/Airways Status    Active Airway      Name: Placement date: Placement time: Site: Days:     Airway Group ET Tube Oral 7.5     Oral  1                  Plateau Pressure (Q Shift): 20 (06/23/17 1342)  Static Compliance (ml / cm H2O): 47 (06/23/17 1342)    Patient failed trials because of Barriers to Wean: Other (Comments) (Pt to critical) (06/23/17 1342)    Mobility Group  Pt Calls for Assistance: No (06/23/17 0800)    Events/Summary/Plan: Pt arrived to unit intubated, and placed on vent. No SBT's or mobilization today. Will continue to monitor.

## 2017-06-23 NOTE — CONSULTS
DATE OF SERVICE:  06/23/2017    REQUESTING PHYSICIAN:  Ashish Carty MD    CONSULTING PHYSICIAN:  Pedro Simmons MD    TYPE OF CONSULTATION:  Pulmonary medicine and critical care medicine.    REASON FOR CONSULTATION:  Evaluation and management of ventilator-dependent   respiratory failure and aspiration.    CHIEF COMPLAINT:  The patient cannot provide.    HISTORY OF PRESENT ILLNESS:  The entire history is obtained from healthcare   providers and medical record as this gentleman cannot give me any history.  I   was kindly asked by Dr. Carty to see and evaluate this gentleman regarding   the above problems.  This is a 77-year-old gentleman who was transferred here   from [facility] in [place], [place].  His name is Xavier Bynum.  He apparently presented to the hospital in [place] acutely   intoxicated with a blood alcohol level of 0.206.  He apparently suffered a   fall with some facial trauma.  He presented with oral bleeding.  He was   hypoxemic with a lot of blood in his mouth at the time of this presentation to   the emergency room in [place].  He was sedated, paralyzed, and intubated.  He   had a 3 cm laceration to his lip, which was repaired by the physician in   [place], [place].  It is suspected that he aspirated.  He had a lot of blood   in his mouth and evidence of aspirated blood suctioned from his endotracheal   tube.  He was then transferred to [facility] for   subspecialty care.  He is now intubated and sedated on the ventilator in the   emergency room.    CURRENT MEDICATIONS:  Records from [place] indicate that he takes albuterol,   aspirin, Protonix, ascorbic acid, quinine, simvastatin, Advair, Combivent,   meloxicam, finasteride, metformin, Wellbutrin, and Norco as well as Flomax.    ALLERGIES:  No known drug allergies.    PAST MEDICAL HISTORY:  It is unknown what history this gentleman has.  I   presume based upon his medications that he has lung  disease such as asthma or   COPD as well as dyslipidemia and diabetes mellitus.    SOCIAL HISTORY, FAMILY HISTORY AND REVIEW OF SYSTEMS:  Not obtainable.    PHYSICAL EXAMINATION:  VITAL SIGNS:  His temperature is 36.2, blood pressure 130/62, heart rate 86,   respiratory rate is 16.  GENERAL:  He is a sedated man on the ventilator.  HEENT:  His head is normocephalic and endotracheal tube is in place.  There is   dried blood on his face, lips and in his mouth.  He has moist mucous   membranes.  NECK:  Trachea midline, supple.  CHEST:  Symmetrical.  HEART:  Regular rhythm.  LUNGS:  There are scattered coarse crackles bilaterally.  ABDOMEN:  Soft, nondistended, nontender.  EXTREMITIES:  No clubbing or cyanosis.  NEUROLOGIC:  He is sedated.    DIAGNOSTIC DATA:  His white blood cell count is 20,800, hemoglobin 11.1,   hematocrit 33.2, platelet count 162,000.  Sodium 133, potassium 3.8, chloride   104, CO2 16, BUN 13, creatinine 0.58, glucose 134.  Lactic acid 3.6.  His   diagnostic alcohol here at [facility] is 0.07.  In [place], it was 0.206.  His INR   is 1.08.  Arterial blood gas shows a pH of 7.26, pCO2 of 39, pO2 of 106.  His   chest x-ray shows faint bibasilar opacities.  A CT scan of the cervical spine   in [place] shows anterior wedging at T1 of uncertain age and etiology.  No   cervical spine fracture was noted.  CT scan of the head showed no acute   intracranial process.  CT scan of the face showed mild right-sided nasal bone   deviation, which appeared to be chronic.    IMPRESSION:  1.  Ventilator-dependent respiratory failure.  2.  Status post ground level fall with facial trauma.  He had a 3 cm   laceration to his lip, which was sutured in [place], [place].  He had   subsequent oral bleeding and aspiration of blood.  3.  Status post aspiration.  4.  Acute alcohol intoxication with blood alcohol level of 0.206 in [place] at   the time of presentation.  5.  Alcohol abuse.  6.  Acute blood loss anemia.  7.   Hyponatremia.  8.  Incomplete historical database secondary to inability to interview the   patient.  9.  Presumed chronic lung disease based upon his medication list.  10.  Presumed dyslipidemia.  11.  Presumed diabetes mellitus.    PLAN AND MEDICAL DECISION MAKING:  This gentleman is critically ill.  He is   going to be admitted to the intensive care unit.  He is intubated on full   mechanical ventilatory support.  Deep venous thrombosis prophylaxis and stress   ulcer prophylaxis will both be provided.  We will culture his sputum and   empirically place him on intravenous ampicillin/sulbactam pending culture   results.  He will be sedated with propofol.  We will give him thiamine,   folate, and multivitamins and observe him for evidence of alcohol withdrawal.    He will be hydrated with intravenous crystalloid solution.  At the current   time, his prognosis is quite guarded and he is critically ill.  I have spent a   total of 86 minutes providing direct critical care services at the bedside.    There has been no time overlap.  The time spent excludes the time spent   performing procedures (43790, 08750).    The case has been reviewed with nursing, respiratory therapy and Dr. Carty.    Thank you, Dr. Carty, for allowing us to participate in the care of this   gentleman.  We will continue to follow him with great interest.       ____________________________________     MD SHAMIR EDOUARD / GABRIELLE    DD:  06/23/2017 05:57:44  DT:  06/23/2017 07:18:53    D#:  [id number]  Job#:  [id number]    cc:  _____ Machelle

## 2017-06-23 NOTE — IP AVS SNAPSHOT
7/12/2017    Xavier Duttaifeanyi  [address] [place] [address]    Dear Xavier:    [facility] wants to ensure your discharge home is safe and you or your loved ones have had all of your questions answered regarding your care after you leave the hospital.    Below is a list of resources and contact information should you have any questions regarding your hospital stay, follow-up instructions, or active medical symptoms.    Questions or Concerns Regarding… Contact   Medical Questions Related to Your Discharge  (7 days a week, 8am-5pm) Contact a Nurse Care Coordinator   [phone]   Medical Questions Not Related to Your Discharge  (24 hours a day / 7 days a week)  Contact the Nurse Health Line   [phone]    Medications or Discharge Instructions Refer to your discharge packet   or contact your [facility] Primary Care Provider   [phone]   Follow-up Appointment(s) Schedule your appointment via [facility]   or contact Scheduling [phone]   Billing Review your statement via [employer]  or contact Billing [phone]   Medical Records Review your records via [employer]   or contact Medical Records [phone]     You may receive a telephone call within two days of discharge. This call is to make certain you understand your discharge instructions and have the opportunity to have any questions answered. You can also easily access your medical information, test results and upcoming appointments via the [employer] free online health management tool. You can learn more and sign up at [email or web address]. For assistance setting up your [employer] account, please call [phone].    Once again, we want to ensure your discharge home is safe and that you have a clear understanding of any next steps in your care. If you have any questions or concerns, please do not hesitate to contact us, we are here for you. Thank you for choosing [facility] for your healthcare needs.    Sincerely,    Your [facility] Healthcare Team

## 2017-06-23 NOTE — H&P
DATE OF SERVICE:  06/23/2017    CONSULTATIONS:  PMA.    HISTORY OF PRESENT ILLNESS:  This was transfer from [facility].    Patient was seen by Dr. Gaspar of trauma.  The patient came in as a trauma   from [facility] after having a fall.  Apparently, patient was seen in   [facility] with alcohol intoxication and status post a fall.  He had   evidence of facial trauma with a laceration to his lip.  The patient   apparently went to respiratory failure in the [facility] and was   intubated to protect airway.  He comes off intubated, sedated on a ventilator,   lip laceration, seen by trauma and cleared by trauma.  Patient referred to me   for further care and management.  All history obtained via the old chart.    Based on medication review, we are looking as a past medical history of:  1.  Diabetes mellitus type 2.  2.  Osteoarthritis.  3.  Possible asthma or COPD .  4.  GERD.  5.  Benign prostatic hypertrophy.    PAST SURGICAL HISTORY:  Unobtainable, patient is intubated.    REVIEW OF SYSTEMS:  Unobtainable, patient is intubated.    FAMILY HISTORY:  Unobtainable, patient is intubated.    SOCIAL HISTORY:  Unobtainable, patient is intubated.  According to   documentation, he was intoxicated at the [facility].    PHYSICAL EXAMINATION:  VITAL SIGNS:  Blood pressure 111/59, respirations 16, pulse 85, afebrile.    Patient was initially hypotensive in our emergency at 90/58.  GENERAL:  He is an elderly male, disheveled, sedated and intubated.  HEENT:  Pupils are 3 mm bilaterally and sluggish.  NECK:  Supple.  HEART:  S1, S2, regular rate.  LUNGS:  He has coarse bilateral rhonchi.  No rales or wheezing.  ABDOMEN:  Soft, nontender, positive bowel sounds appreciated.  EXTREMITIES:  No edema noted.  No cyanosis or clubbing.  NEUROLOGIC:  He is sedated.    LABORATORY DATA:  Urinalysis within normal limits.  PH is 7.16, pCO2 of 59,   PTT of 24.  Lactic acid of 3.  White cell count of 20,  hemoglobin of 10,   hematocrit 32, platelet count is 166.  Alcohol level was 206, the range is   less than 10 mm per deciliter.  Troponin of 0.  Sodium 132, potassium 3.7,   glucose 210, BUN of 10, creatinine of 0.7.  Total bilirubin 0.3, calcium is   7.6, albumin is 3.3.  Tylenol level less than 1.  CT cervical spine shows   anterior wedging of C1 of uncertain etiology.  CT head is unremarkable.    IMPRESSION:  1.  Acute hypoxemic respiratory failure requiring mechanical ventilation.  2.  Aspiration pneumonitis.  3.  Septic shock.  4.  Severe metabolic acidosis.  5.  Hyponatremia.  6.  Alcohol intoxication.  7.  History of diabetes mellitus type 2.  8.  History of gastroesophageal reflux disease.  9.  History of benign prostatic hypertrophy.    PLAN:  Patient is critically ill, will be monitored in the ICU.  Patient   initiated on sepsis protocol and _____ boluses and fluid management.  Followup   lactic acid trend.  Given antibiotic Unasyn 3 g q. 6 hours.  Follow up on all   cultures.  Sputum culture has been ordered.  [facility] has been consulted for vent   management and sedate as needed.  Patient is currently on propofol.  Once the   nasogastric tube is in place, patient should receive thiamine and folate.  GI   prophylaxis with Pepcid 20 mg IV q.12 hours.  Fingersticks q. 6 hours with   sliding scale insulin.  This patient is critically ill and 50 minutes of   critical care time was taken assessing patient and forming treatment plan.  We   also follow patient's procalcitonin level.       ____________________________________     MD LANE CARDENAS / GABRIELLE    DD:  06/23/2017 05:25:05  DT:  06/23/2017 06:43:07    D#:  [id number]  Job#:  [id number]

## 2017-06-23 NOTE — IP AVS SNAPSHOT
" Home Care Instructions                                                                                                                  Name:Xavier Bynum  Medical Record Number:[id number]  CSN: [id number]    YOB: 1940   Age: 77 y.o.  Sex: male  HT:1.803 m (5' 11\") WT: 74.7 kg (164 lb 10.9 oz)          Admit Date: 6/23/2017     Discharge Date:   Today's Date: 7/12/2017  Attending Doctor:  Leeanne Lopez M.D.                  Allergies:  Review of patient's allergies indicates no known allergies.            Discharge Instructions       Discharge Instructions    Discharged to home by car with relative. Discharged via wheelchair, hospital escort: Yes.  Special equipment needed: Not Applicable    Be sure to schedule a follow-up appointment with your primary care doctor or any specialists as instructed.     Discharge Plan:   Diet Plan: Discussed (diabetic diet PUREED WITH NECTAR THICKENED LIQUID)  Activity Level: Discussed (ACTIVITY AS TOLERATED)  Smoking Cessation Offered: Patient Refused  Confirmed Follow up Appointment: Patient to Call and Schedule Appointment  Confirmed Symptoms Management: Discussed  Medication Reconciliation Updated: Yes  Influenza Vaccine Indication: Not indicated: Previously immunized this influenza season and > 8 years of age    I understand that a diet low in cholesterol, fat, and sodium is recommended for good health. Unless I have been given specific instructions below for another diet, I accept this instruction as my diet prescription.   Other diet: DIABETIC DIET, PUREED WITH NECTAR THICKENED FLUID    Special Instructions: None    · Is patient discharged on Warfarin / Coumadin?   No     · Is patient Post Blood Transfusion?  No        Depression / Suicide Risk    As you are discharged from this [facility] facility, it is important to learn how to keep safe from harming yourself.    Recognize the warning signs:  · Abrupt changes in personality, positive or negative- including " increase in energy   · Giving away possessions  · Change in eating patterns- significant weight changes-  positive or negative  · Change in sleeping patterns- unable to sleep or sleeping all the time   · Unwillingness or inability to communicate  · Depression  · Unusual sadness, discouragement and loneliness  · Talk of wanting to die  · Neglect of personal appearance   · Rebelliousness- reckless behavior  · Withdrawal from people/activities they love  · Confusion- inability to concentrate     If you or a loved one observes any of these behaviors or has concerns about self-harm, here's what you can do:  · Talk about it- your feelings and reasons for harming yourself  · Remove any means that you might use to hurt yourself (examples: pills, rope, extension cords, firearm)  · Get professional help from the community (Mental Health, Substance Abuse, psychological counseling)  · Do not be alone:Call your Safe Contact- someone whom you trust who will be there for you.  · Call your local CRISIS HOTLINE 085-2673 or 899-259-2759  · Call your local Children's Mobile Crisis Response Team [place] (620) 022-2269 or wwwFashionQlub  · Call the toll free National Suicide Prevention Hotlines   · National Suicide Prevention Lifeline 566-943-PFIB (4705)  · National Hope Line Network 800-SUICIDE (439-9369)            Diabetes and Exercise  Exercising regularly is important. It is not just about losing weight. It has many health benefits, such as:  · Improving your overall fitness, flexibility, and endurance.  · Increasing your bone density.  · Helping with weight control.  · Decreasing your body fat.  · Increasing your muscle strength.  · Reducing stress and tension.  · Improving your overall health.  People with diabetes who exercise gain additional benefits because exercise:  · Reduces appetite.  · Improves the body's use of blood sugar (glucose).  · Helps lower or control blood glucose.  · Decreases blood  pressure.  · Helps control blood lipids (such as cholesterol and triglycerides).  · Improves the body's use of the hormone insulin by:  · Increasing the body's insulin sensitivity.  · Reducing the body's insulin needs.  · Decreases the risk for heart disease because exercising:  · Lowers cholesterol and triglycerides levels.  · Increases the levels of good cholesterol (such as high-density lipoproteins [HDL]) in the body.  · Lowers blood glucose levels.  YOUR ACTIVITY PLAN   Choose an activity that you enjoy, and set realistic goals. To exercise safely, you should begin practicing any new physical activity slowly, and gradually increase the intensity of the exercise over time. Your health care provider or diabetes educator can help create an activity plan that works for you. General recommendations include:  · Encouraging children to engage in at least 60 minutes of physical activity each day.  · Stretching and performing strength training exercises, such as yoga or weight lifting, at least 2 times per week.  · Performing a total of at least 150 minutes of moderate-intensity exercise each week, such as brisk walking or water aerobics.  · Exercising at least 3 days per week, making sure you allow no more than 2 consecutive days to pass without exercising.  · Avoiding long periods of inactivity (90 minutes or more). When you have to spend an extended period of time sitting down, take frequent breaks to walk or stretch.  RECOMMENDATIONS FOR EXERCISING WITH TYPE 1 OR TYPE 2 DIABETES   · Check your blood glucose before exercising. If blood glucose levels are greater than 240 mg/dL, check for urine ketones. Do not exercise if ketones are present.  · Avoid injecting insulin into areas of the body that are going to be exercised. For example, avoid injecting insulin into:  · The arms when playing tennis.  · The legs when jogging.  · Keep a record of:  · Food intake before and after you exercise.  · Expected peak times of  insulin action.  · Blood glucose levels before and after you exercise.  · The type and amount of exercise you have done.  · Review your records with your health care provider. Your health care provider will help you to develop guidelines for adjusting food intake and insulin amounts before and after exercising.  · If you take insulin or oral hypoglycemic agents, watch for signs and symptoms of hypoglycemia. They include:  · Dizziness.  · Shaking.  · Sweating.  · Chills.  · Confusion.  · Drink plenty of water while you exercise to prevent dehydration or heat stroke. Body water is lost during exercise and must be replaced.  · Talk to your health care provider before starting an exercise program to make sure it is safe for you. Remember, almost any type of activity is better than none.     This information is not intended to replace advice given to you by your health care provider. Make sure you discuss any questions you have with your health care provider.     Document Released: 03/09/2005 Document Revised: 05/03/2016 Document Reviewed: 05/27/2014  [employer] Interactive Patient Education ©2016 [employer]            Thickening Liquids for Dysphagia Diet  If you are on the dysphagia diet, you may need to thicken drinks, soups, foods that melt at room temperature, and other liquids before you drink or eat them. Thickening liquids makes them easier to swallow. It also reduces the risk of liquid traveling to your lungs.  To make a thickened liquid you will need to add a commercial thickening product or a soft food to the liquid until it reaches the consistency it needs to be. Your health care provider or dietitian will explain to you the consistency you need to aim for. Liquid consistencies include:  · Thin. Thin liquids include most drinks (such as water, milk, tea, soda, juice, carbonated drinks), as well as ice cream, sherbet, sorbet, ice pops, and broth-based soups.  · Nectar-like. Nectar-like liquids include maple  syrup and creamy soup.  · Honey-like. Honey-like liquids are made to be runny but are thick like honey. They cannot be sipped through a straw.  · Spoon-thick. Spoon-thick liquids are thick, like pudding.  MY PLAN  I should thicken my liquids to a _______________ consistency.  DIET GUIDELINES  · Thicken liquids to the consistency your health care provider recommends.  · Follow your dietitian's or health care provider's recommendation on how to thicken your liquids.  · See your dietitian or health care provider regularly for help with your dietary changes.  HOW CAN I THICKEN MY LIQUIDS?  Liquids can be thickened with a commercial food and beverage thickener or with a soft food.  Commercial Food and Beverage Thickeners  A food and beverage thickener is a powder or gel that makes a food or beverage thicker. Thickeners are sold at pharmacies, medical supply stores, some grocery stores, and online. They can be added to both hot and cold liquids and do not change the taste of the liquid. Ask your health care provider or dietitian for a complete list of commercial thickeners.  Each thickening product is different. Some need to be blended into a liquid with a  while others can be stirred into a liquid with a fork or spoon. Follow the instructions on the product label.  Soft Foods  Some foods such as soups, casseroles, and gravies can be thickened with soft foods. Soft foods include:  · Baby cereal.  · Gravy powder.  · Mashed potato.  · Pureed baby food.  · Instant potato flakes.  · Powdered sauce mixes (such as cheese mixes).  · Flour.  To use one of these soft food items, stir or mix them into the thin liquid until it reaches the desired thickness. Start with a small amount and adjust soft food and liquid as necessary.  Note: Flour works best with warm liquids, such as broth. To thicken a liquid with flour, make a paste out of flour and water. Cook or warm your liquid and add the paste to it. Stir until the mixture  thickens.  WHAT ARE SOME TIPS TO MAKE THICKENING LIQUIDS EASIER?  · Take thickeners with you when eating out or traveling.  · If a liquid gets too thick, add more of the thinner liquid until the desired consistency is reached.  · Consider purchasing pre-made thickened drinks.  · Consider using a thickening product to make your own frozen desserts.     This information is not intended to replace advice given to you by your health care provider. Make sure you discuss any questions you have with your health care provider.     Document Released: 06/18/2013 Document Revised: 12/23/2014 Document Reviewed: 12/01/2014  "[employer] Interactive Patient Education ©2016 [employer].              Aspiration Pneumonia  Aspiration pneumonia is an infection in your lungs. It occurs when food, liquid, or stomach contents (vomit) are inhaled (aspirated) into your lungs. When these things get into your lungs, swelling (inflammation) and infection can occur. This can make it difficult for you to breathe. Aspiration pneumonia is a serious condition and can be life threatening.  RISK FACTORS  Aspiration pneumonia is more likely to occur when a person's cough (gag) reflex or ability to swallow has been decreased. Some things that can do this include:   Having a brain injury or disease, such as stroke, seizures, Parkinson's disease, dementia, or amyotrophic lateral sclerosis (ALS).    Being given general anesthetic for procedures.    Being in a coma (unconscious).    Having a narrowing of the tube that carries food to the stomach (esophagus).    Drinking too much alcohol. If a person passes out and vomits, vomit can be swallowed into the lungs.    Taking certain medicines, such as tranquilizers or sedatives.    SIGNS AND SYMPTOMS   Coughing after swallowing food or liquids.    Breathing problems, such as wheezing or shortness of breath.    Bluish skin. This can be caused by lack of oxygen.    Coughing up food or mucus. The mucus might  contain blood, greenish material, or yellowish-white fluid (pus).    Fever.    Chest pain.    Being more tired than usual (fatigue).    Sweating more than usual.    Bad breath.    DIAGNOSIS   A physical exam will be done. During the exam, the health care provider will listen to your lungs with a stethoscope to check for:   Crackling sounds in the lungs.  Decreased breath sounds.  A rapid heartbeat.  Various tests may be ordered. These may include:   Chest X-ray.    CT scan.    Swallowing study. This test looks at how food is swallowed and whether it goes into your breathing tube (trachea) or food pipe (esophagus).    Sputum culture. Saliva and mucus (sputum) are collected from the lungs or the tubes that carry air to the lungs (bronchi). The sputum is then tested for bacteria.    Bronchoscopy. This test uses a flexible tube (bronchoscope) to see inside the lungs.  TREATMENT   Treatment will usually include antibiotic medicines. Other medicines may also be used to reduce fever or pain. You may need to be treated in the hospital. In the hospital, your breathing will be carefully monitored. Depending on how well you are breathing, you may need to be given oxygen, or you may need breathing support from a breathing machine (ventilator). For people who fail a swallowing study, a feeding tube might be placed in the stomach, or they may be asked to avoid certain food textures or liquids when they eat.  HOME CARE INSTRUCTIONS   Carefully follow any special eating instructions you were given, such as avoiding certain food textures or thickening liquids. This reduces the risk of developing aspiration pneumonia again.   Only take over-the-counter or prescription medicines as directed by your health care provider. Follow the directions carefully.    If you were prescribed antibiotics, take them as directed. Finish them even if you start to feel better.    Rest as instructed by your health care provider.    Keep all follow-up  appointments with your health care provider.    SEEK MEDICAL CARE IF:   You develop worsening shortness of breath, wheezing, or difficulty breathing.    You develop a fever.    You have chest pain.    MAKE SURE YOU:   Understand these instructions.  Will watch your condition.  Will get help right away if you are not doing well or get worse.     This information is not intended to replace advice given to you by your health care provider. Make sure you discuss any questions you have with your health care provider.     Document Released: 10/15/2010 Document Revised: 12/23/2014 Document Reviewed: 06/05/2014  [employer] Interactive Patient Education ©2016 [employer].      Follow-up Information     1. Follow up with P[facility]. Schedule an appointment as soon as possible for a visit in 1 week.    Specialty:  Family Medicine         Discharge Medication Instructions:    Below are the medications your physician expects you to take upon discharge:    Review all your home medications and newly ordered medications with your doctor and/or pharmacist. Follow medication instructions as directed by your doctor and/or pharmacist.    Please keep your medication list with you and share with your physician.               Medication List      START taking these medications        Instructions    Morning Afternoon Evening Bedtime    folic acid 1 MG Tabs   Last time this was given:  1 mg on 7/12/2017  8:29 AM   Commonly known as:  FOLVITE        Take 1 Tab by mouth every day.   Dose:  1 mg                        oxycodone immediate-release 5 MG Tabs   Last time this was given:  10 mg on 7/11/2017 10:00 PM   Commonly known as:  ROXICODONE        Take 1 Tab by mouth every four hours as needed.   Dose:  5 mg                        quetiapine 25 MG Tabs   Last time this was given:  50 mg on 7/11/2017  8:32 PM   Commonly known as:  SEROQUEL        Take 1 Tab by mouth every evening.   Dose:  25 mg                        thiamine 100  MG tablet   Last time this was given:  100 mg on 7/12/2017  8:28 AM   Commonly known as:  THIAMINE        Take 1 Tab by mouth every day.   Dose:  100 mg                        tiotropium 18 MCG Caps   Last time this was given:  1 Cap on 7/12/2017  8:29 AM   Commonly known as:  SPIRIVA        Inhale 1 Cap by mouth every day.   Dose:  18 mcg                          CONTINUE taking these medications        Instructions    Morning Afternoon Evening Bedtime    aspirin 81 MG Chew chewable tablet   Last time this was given:  81 mg on 7/11/2017  2:31 PM   Commonly known as:  ASA        Take 81 mg by mouth 2 times a day.   Dose:  81 mg                        CENTRUM SILVER ADULT 50+ PO        Take  by mouth every day.                        DIGESTIVE ADVANTAGE Caps        Take  by mouth.                        finasteride 5 MG Tabs   Commonly known as:  PROSCAR        Take 5 mg by mouth every day.   Dose:  5 mg                        glipiZIDE SR 2.5 MG Tb24   Last time this was given:  2.5 mg on 7/12/2017  8:29 AM   Commonly known as:  GLUCOTROL        Take 2.5 mg by mouth every day.   Dose:  2.5 mg                        glucosamine Sulfate 500 MG Caps        Take 500 mg by mouth 3 times a day, with meals.   Dose:  500 mg                        metformin  MG Tb24   Commonly known as:  GLUCOPHAGE XR        Take 500 mg by mouth every day.   Dose:  500 mg                        pantoprazole 40 MG Tbec   Commonly known as:  PROTONIX        Take 40 mg by mouth every day.   Dose:  40 mg                        quiNINE 324 MG capsule        Take 324 mg by mouth every evening.   Dose:  324 mg                        simvastatin 40 MG Tabs   Last time this was given:  40 mg on 7/11/2017  8:32 PM   Commonly known as:  ZOCOR        Take 40 mg by mouth every evening.   Dose:  40 mg                        tamsulosin 0.4 MG capsule   Commonly known as:  FLOMAX        Take 0.4 mg by mouth ONE-HALF HOUR AFTER BREAKFAST.   Dose:  0.4  mg                             Where to Get Your Medications      You can get these medications from any pharmacy     Bring a paper prescription for each of these medications    - folic acid 1 MG Tabs  - quetiapine 25 MG Tabs  - thiamine 100 MG tablet  - tiotropium 18 MCG Caps      Information about where to get these medications is not yet available     ! Ask your nurse or doctor about these medications    - oxycodone immediate-release 5 MG Tabs            Orders for after discharge     REFERRAL TO HOME HEALTH    Complete by:  As directed    Home health will create and establish a plan of care       REFERRAL TO SKILLED NURSING FACILITY    Complete by:  As directed        REFERRAL TO SKILLED NURSING FACILITY    Complete by:  As directed              Instructions           Diet / Nutrition:    Follow any diet instructions given to you by your doctor or the dietician, including how much salt (sodium) you are allowed each day.    If you are overweight, talk to your doctor about a weight reduction plan.    Activity:    Remain physically active following your doctor's instructions about exercise and activity.    Rest often.     Any time you become even a little tired or short of breath, SIT DOWN and rest.    Worsening Symptoms:    Report any of the following signs and symptoms to the doctor's office immediately:    *Pain of jaw, arm, or neck  *Chest pain not relieved by medication                               *Dizziness or loss of consciousness  *Difficulty breathing even when at rest   *More tired than usual                                       *Bleeding drainage or swelling of surgical site  *Swelling of feet, ankles, legs or stomach                 *Fever (>100ºF)  *Pink or blood tinged sputum  *Weight gain (3lbs/day or 5lbs /week)           *Shock from internal defibrillator (if applicable)  *Palpitations or irregular heartbeats                *Cool and/or numb extremities    Stroke Awareness    Common Risk Factors  for Stroke include:    Age  Atrial Fibrillation  Carotid Artery Stenosis  Diabetes Mellitus  Excessive alcohol consumption  High blood pressure  Overweight   Physical inactivity  Smoking    Warning signs and symptoms of a stroke include:    *Sudden numbness or weakness of the face, arm or leg (especially on one side of the body).  *Sudden confusion, trouble speaking or understanding.  *Sudden trouble seeing in one or both eyes.  *Sudden trouble walking, dizziness, loss of balance or coordination.Sudden severe headache with no known cause.    It is very important to get treatment quickly when a stroke occurs. If you experience any of the above warning signs, call 911 immediately.                   Disclaimer         Quit Smoking / Tobacco Use:    I understand the use of any tobacco products increases my chance of suffering from future heart disease or stroke and could cause other illnesses which may shorten my life. Quitting the use of tobacco products is the single most important thing I can do to improve my health. For further information on smoking / tobacco cessation call a Toll Free Quit Line at 1-314.159.8814 (*National Cancer [place]) or 1-647.882.2050 (American Lung Association) or you can access the web based program at www.lungusa.org.    [place] Tobacco Users Help Line:  (644) 754-2198       Toll Free: 1-220.803.2015  Quit Tobacco Program [facility] [phone]    Crisis Hotline:    [place] Crisis Hotline:  8-654-NRUDBED or 1-331.367.4366    [place] Crisis Hotline:    1-830.362.6821 or 350-005-2124    Discharge Survey:   Thank you for choosing [facility]. We hope we did everything we could to make your hospital stay a pleasant one. You may be receiving a phone survey and we would appreciate your time and participation in answering the questions. Your input is very valuable to us in our efforts to improve our service to our patients and their families.        My signature on  this form indicates that:    1. I have reviewed and understand the above information.  2. My questions regarding this information have been answered to my satisfaction.  3. I have formulated a plan with my discharge nurse to obtain my prescribed medications for home.                  Disclaimer         __________________________________                     __________       ________                       Patient Signature                                                 Date                    Time

## 2017-06-23 NOTE — ED PROVIDER NOTES
"ED Provider Note    Scribed for No att. providers found by Carlos Flores. 6/23/2017  4:11 AM    Primary care provider: No primary care provider on file.  Means of arrival: EMS  History obtained from: EMS  History limited by: Patient's Trauma Status    CHIEF COMPLAINT  Trauma Red      HPI  Shade Al is a 77 y.o. male who presents to the Emergency Department as a trauma red transfer from [facility].  The patient fell forward and lacerated his right upper lip.  The patient was evaluated at the transferring hospital and was sent as he aspirated.  Per EMS, the patient was known to be intoxicated and altered.  En route, the patient was administered propofol and versed and placed on a ventilator to assist with respirations.      Further HPI limited by patient's mental status    REVIEW OF SYSTEMS  Review of systems is unobtainable secondary to the patient's acute medical condition    Further HPI limited by patient's mental status    PAST MEDICAL HISTORY   No pertinent past medical history    SURGICAL HISTORY  patient denies any surgical history    SOCIAL HISTORY  Patient arrives intoxicated    FAMILY HISTORY  No significant family history    CURRENT MEDICATIONS  No current facility-administered medications on file prior to encounter.     No current outpatient prescriptions on file prior to encounter.       ALLERGIES  No Known Allergies    PHYSICAL EXAM  VITAL SIGNS: /62 mmHg  Pulse 90  Resp 22  Ht 1.803 m (5' 11\")  Wt 83.6 kg (184 lb 4.9 oz)  BMI 25.72 kg/m2  SpO2 99%    Nursing note and vitals reviewed.  Constitutional: Well-developed critically ill-appearing male, intubated, full spine precautions   HENT: Head is normocephalic, there is a sutured laceration to the right upper lip. Endotracheal tube is in place   Eyes: Pupils are equal, round, and reactive to light. Conjunctiva are normal.   Cardiovascular: Normal rate and regular rhythm. No murmur heard. Normal radial " pulses.  Pulmonary/Chest: Mechanically ventilated. Symmetric breath sounds. Breath sounds normal. No wheezes or rales.   Abdominal: Soft. No distention    Musculoskeletal: No gross deformities   Neurological: GCS of 3T  Skin: Skin is warm and dry. No rash. Upper lip laceration as above.  Psychiatric: Unable to assess    DIAGNOSTIC STUDIES / PROCEDURES      LABS  Results for orders placed or performed during the hospital encounter of 06/23/17   DIAGNOSTIC ALCOHOL   Result Value Ref Range    Diagnostic Alcohol 0.07 (H) 0.00 g/dL   CBC WITHOUT DIFFERENTIAL   Result Value Ref Range    WBC 20.8 (H) 4.8 - 10.8 K/uL    RBC 3.32 (L) 4.70 - 6.10 M/uL    Hemoglobin 11.1 (L) 14.0 - 18.0 g/dL    Hematocrit 33.2 (L) 42.0 - 52.0 %    .0 (H) 81.4 - 97.8 fL    MCH 33.4 (H) 27.0 - 33.0 pg    MCHC 33.4 (L) 33.7 - 35.3 g/dL    RDW 48.7 35.9 - 50.0 fL    Platelet Count 162 (L) 164 - 446 K/uL    MPV 10.1 9.0 - 12.9 fL   COMP METABOLIC PANEL   Result Value Ref Range    Sodium 133 (L) 135 - 145 mmol/L    Potassium 3.8 3.6 - 5.5 mmol/L    Chloride 104 96 - 112 mmol/L    Co2 16 (L) 20 - 33 mmol/L    Anion Gap 13.0 (H) 0.0 - 11.9    Glucose 134 (H) 65 - 99 mg/dL    Bun 13 8 - 22 mg/dL    Creatinine 0.58 0.50 - 1.40 mg/dL    Calcium 8.1 (L) 8.5 - 10.5 mg/dL    AST(SGOT) 37 12 - 45 U/L    ALT(SGPT) 18 2 - 50 U/L    Alkaline Phosphatase 69 30 - 99 U/L    Total Bilirubin 0.5 0.1 - 1.5 mg/dL    Albumin 3.6 3.2 - 4.9 g/dL    Total Protein 5.7 (L) 6.0 - 8.2 g/dL    Globulin 2.1 1.9 - 3.5 g/dL    A-G Ratio 1.7 g/dL   PROTHROMBIN TIME   Result Value Ref Range    PT 14.3 12.0 - 14.6 sec    INR 1.08 0.87 - 1.13   APTT   Result Value Ref Range    APTT 26.3 24.7 - 36.0 sec   LACTIC ACID   Result Value Ref Range    Lactic Acid 3.6 (H) 0.5 - 2.0 mmol/L   COD (ADULT)   Result Value Ref Range    ABO Grouping Only A     Rh Grouping Only POS     Antibody Screen-Cod NEG    ESTIMATED GFR   Result Value Ref Range    GFR If  >60 >60  mL/min/1.73 m 2    GFR If Non African American >60 >60 mL/min/1.73 m 2   ISTAT ARTERIAL BLOOD GAS   Result Value Ref Range    Ph 7.264 (LL) 7.400 - 7.500    Pco2 39.0 (H) 26.0 - 37.0 mmHg    Po2 106 (H) 64 - 87 mmHg    Tco2 19 (L) 20 - 33 mmol/L    S02 97 93 - 99 %    Hco3 17.6 17.0 - 25.0 mmol/L    BE -9 (L) -4 - 3 mmol/L    Body Temp see below degrees    O2 Therapy 40 %    Specimen Arterial        All labs reviewed by me.    RADIOLOGY  DX-CHEST-LIMITED (1 VIEW)   Final Result      Unremarkable chest.               INTERPRETING LOCATION: [address] [place] [address], [id number]      OUTSIDE IMAGES-CT CERVICAL SPINE   Preliminary Result      OUTSIDE IMAGES-CT FACE   Preliminary Result      OUTSIDE IMAGES-CT HEAD   Preliminary Result      OUTSIDE IMAGES-DX CHEST   Preliminary Result      OUTSIDE IMAGES-DX CHEST   Preliminary Result        The radiologist's interpretation of all radiological studies have been reviewed by me.    COURSE & MEDICAL DECISION MAKING  Nursing notes, VS, PMSFHx reviewed in chart.     4:11 AM - Patient seen and examined at bedside. Patient will be treated with propofol injection. Ordered a chest xray, diagnostic alcohol, CBC without differential, CMP, Prothrombin Time, APTT, Lactic Acid, Platelet Mapping with Basic TEG, COD, Component Cellular, ABO Confirmation to evaluate his symptoms.     4:49 AM - [facility] and Hospitalist paged    4:54 AM I discussed the patient's case and the above findings with Dr. Panchal (Hospitalist) who will admit the patient.    5:30 AM I discussed the patient's case and the above findings with Dr. Simmons ([facility]) who will consult with the patient.    Patient presents today as a trauma red activation. He likely has an aspiration pneumonia. White blood cell count is elevated at 20. ABG is remarkable for metabolic acidosis.    CRITICAL CARE  I provided critical care services, which included medication orders, frequent reevaluations of the patient's condition and response to  treatment, ordering and reviewing test results, and discussing the case with various consultants.  The critical care time associated with the care of the patient was 35 minutes. Review chart for interventions. This time is exclusive of any other billable procedures.        DISPOSITION:  Patient will be admitted to the Hospitalist in critical condition.      FINAL IMPRESSION  1. Acute alcohol intoxication, with delirium (CMS-[facility])    2. Facial laceration, initial encounter    3. Acute respiratory failure, unspecified whether with hypoxia or hypercapnia (CMS-[facility])          ICarlos (Alison), am scribing for, and in the presence of, No att. providers found.    Electronically signed by: Carlos Flores (Alison), 6/23/2017    I, No att. providers found personally performed the services described in this documentation, as scribed by Carlos Flores in my presence, and it is both accurate and complete.    The note accurately reflects work and decisions made by me.  Ashish Carty  6/23/2017  11:15 AM

## 2017-06-23 NOTE — PROGRESS NOTES
Spoke with Hubert Tomas regarding spinal precautions, he said there is no trauma or injury present and no spinal precautions are necessary.

## 2017-06-23 NOTE — PROGRESS NOTES
Updated MD Bryan about patients Lactic acid 2.7, will continue maintained fluids at 100 mls/hr. B/P stable with no pressors. U/O 100-175 every 2 hrs will continue to monitor.

## 2017-06-23 NOTE — OP REPORT
DATE OF SERVICE:  06/23/2017    TITLE OF PROCEDURE:  Diagnostic and therapeutic flexible fiberoptic   bronchoscopy.    INDICATION FOR THE PROCEDURE:  Atelectasis, aspiration.    POSTPROCEDURE DIAGNOSES:  1.  Normal endobronchial anatomy.  2.  No endobronchial tumor identified.  3.  Moderate amounts of juicy tan and a slightly blood tinged secretions seen   bilaterally, suctioned until clear.    NARRATIVE:  The patient was sedated, intubated and ventilated at the time of   this procedure.  The flexible fiberoptic bronchoscope was inserted through the   lumen of the endotracheal tube and advanced into the distal trachea without   difficulty.  The airways were examined to the subsegmental bronchus level   bilaterally.  The endobronchial anatomy was normal.  No tumor was identified.    There was a moderate amount of juicy tan slightly blood tinged secretions   seen bilaterally and these were suctioned until clear.  Bilateral bronchial   washings from all lobes of both lungs were submitted to the laboratory for   cytology, Gram stain, culture and sensitivity, acid fast bacilli smear and culture   and fungal culture.  The patient tolerated the procedure quite nicely.  No   complications are apparent.  His heart rate and rhythm, blood pressure and   oxygen saturation were continuously monitored.       ____________________________________     MD SHAIMR EDOUARD / GABRIELLE    DD:  06/23/2017 06:09:46  DT:  06/23/2017 06:56:06    D#:  [id number]  Job#:  [id number]

## 2017-06-23 NOTE — IP AVS SNAPSHOT
" <p align=\"LEFT\"><IMG SRC=\"//[email or web address]\" alt=\"Image\" WIDTH=\"50%\" HEIGHT=\"200\" BORDER=\"\"></p>                   Name:Xavier Bynum  Medical Record Number:[id number]  CSN: [id number]    YOB: 1940   Age: 77 y.o.  Sex: male  HT:1.803 m (5' 11\") WT: 74.7 kg (164 lb 10.9 oz)          Admit Date: 6/23/2017     Discharge Date:   Today's Date: 7/12/2017  Attending Doctor:  Leeanne Lopez M.D.                  Allergies:  Review of patient's allergies indicates no known allergies.          Follow-up Information     1. Follow up with Pcp Not In Computer. Schedule an appointment as soon as possible for a visit in 1 week.    Specialty:  Family Medicine         Medication List      Take these Medications        Instructions    aspirin 81 MG Chew chewable tablet   Commonly known as:  ASA    Take 81 mg by mouth 2 times a day.   Dose:  81 mg       CENTRUM SILVER ADULT 50+ PO    Take  by mouth every day.       DIGESTIVE ADVANTAGE Caps    Take  by mouth.       finasteride 5 MG Tabs   Commonly known as:  PROSCAR    Take 5 mg by mouth every day.   Dose:  5 mg       folic acid 1 MG Tabs   Commonly known as:  FOLVITE    Take 1 Tab by mouth every day.   Dose:  1 mg       glipiZIDE SR 2.5 MG Tb24   Commonly known as:  GLUCOTROL    Take 2.5 mg by mouth every day.   Dose:  2.5 mg       glucosamine Sulfate 500 MG Caps    Take 500 mg by mouth 3 times a day, with meals.   Dose:  500 mg       metformin  MG Tb24   Commonly known as:  GLUCOPHAGE XR    Take 500 mg by mouth every day.   Dose:  500 mg       oxycodone immediate-release 5 MG Tabs   Commonly known as:  ROXICODONE    Take 1 Tab by mouth every four hours as needed.   Dose:  5 mg       pantoprazole 40 MG Tbec   Commonly known as:  PROTONIX    Take 40 mg by mouth every day.   Dose:  40 mg       quetiapine 25 MG Tabs   Commonly known as:  SEROQUEL    Take 1 Tab by mouth every evening.   Dose:  25 mg       quiNINE 324 MG capsule    Take 324 mg by mouth every " evening.   Dose:  324 mg       simvastatin 40 MG Tabs   Commonly known as:  ZOCOR    Take 40 mg by mouth every evening.   Dose:  40 mg       tamsulosin 0.4 MG capsule   Commonly known as:  FLOMAX    Take 0.4 mg by mouth ONE-HALF HOUR AFTER BREAKFAST.   Dose:  0.4 mg       thiamine 100 MG tablet   Commonly known as:  THIAMINE    Take 1 Tab by mouth every day.   Dose:  100 mg       tiotropium 18 MCG Caps   Commonly known as:  SPIRIVA    Inhale 1 Cap by mouth every day.   Dose:  18 mcg

## 2017-06-23 NOTE — ED NOTES
Pt arrived by [facility]. Transfer from [place]. Lip laceration from [facility] PTA. Possible aspiration leading to intubation.  VSS. GCS 7.

## 2017-06-23 NOTE — DISCHARGE PLANNING
Trauma Response    Referral: Trauma Red Transfer Response    Intervention: SW responded to trauma Red Transfer from [place].  Pt was BIB [facility] and [facility] after GLF, ETOH.  Pt was unresponsive and intubated upon arrival.  Pts name is Xavier Bynum (: 1940).  SW obtained the following pt information: from [facility] nurse and Pt. Transfer chart.  SW was unable to contact pts spouse Dara at [phone].  Message left. SW was notified by [facility] nurse family is aware Pt in hospital and transferred here.

## 2017-06-23 NOTE — PROGRESS NOTES
Report received from CARRIE Hansen from ED. RT at bedside. Transported to [facility] room T627. Report given to CARRIE Cordon for day shift.

## 2017-06-23 NOTE — DIETARY
"Nutrition Support Assessment    Shade Fifty-Two is a 77 y.o. male with admitting DX of Aspiration pneumonitis (CMS-HCC.), Respiratory failure (CMS-HCC)    Pertinent History: DM, COPD or asthma, GERD  Allergies: Review of patient's allergies indicates not on file.    Height: 180.3 cm (5' 11\")  Weight: 83.9 kg (184 lb 15.5 oz)  Ideal Body Weight: 78.019 kg (172 lb)  Percent Ideal Body Weight: 107.5  Body mass index is 25.81 kg/(m^2).     Pertinent Labs: Na 133, CO2 16, anion gap 13.0, glucose 134, calcium 8.1, lactic acid 2.3, POC glu/24 hours 121-131  Pertinent Medications: Pepcid, folic acid, SSI, magnesium sulfate, adult electrolyte replacement protocol, MVI, propofol, senokot, thiamine; prn bowel meds  Pertinent Fluids: NS @ 100 ml/hr  Surgery / Procedures: Pt arrived intubated   Skin: Facial lacerations  GI: Last BM PTA    Estimated Needs: REE per MSJ x1.2 = 1917 kcal/day; RMR per PSU (vent L/min 9.2, T max/24 hours 36.7) = 1734 kcal/day  Total Calories / day: 1700 - 1900 (Calories / k -  23)  Total Grams Protein / day: 101 - 125 (Grams Protein / k.2 - 1.5)  Total Fluids ml / day: 2090 ml         Assessment / Evaluation:  Pt report, pt was intoxicated, fell, aspirated blood, and was intubated to protect airway. Pt was cleared by trauma.   Orders received to start TF.   Propofol @ 30.1 ml/hr provides 795 kcal/day; will adjust TF.  Carbohydrate-controlled TF formula indicated.    Plan / Recommendation:  Start Peptamen Intense VHP @ 25 ml/hr and advance per protocol to goal rate with propofol 45 ml/hr to provide 1080 kcal (+Kcal from propofol), 101 grams protein, and 907 ml free water per day.  When propofol D/c'd, change TF to Impact Peptide 1.5 @ goal rate 50 ml/hr to provide 1800 kcal, 113 grams protein, and 924 ml free water per day.  Fluids per MD.    RD following.        "

## 2017-06-23 NOTE — CARE PLAN
Problem: Risk for injury related to physical restraint use  Goal: Safe and appropriate use of physical restraints. Restraints discontinued at the earliest possibility while ensuring patient safety.  Restraints assessed, decreased stimulation, and reassurance provided. Restraints documented in flow sheets.     Problem: Hemodynamic Status  Goal: Vital Signs and Fluid Balance Management  Continuous cardiac/pulse monitoring in place. B/P, I/O all documented in flow sheets. Pulses and caprefill assessed. See CIC Obs for full body assessment.

## 2017-06-24 ENCOUNTER — APPOINTMENT (OUTPATIENT)
Dept: RADIOLOGY | Facility: MEDICAL CENTER | Age: 77
DRG: 163 | End: 2017-06-24
Attending: INTERNAL MEDICINE
Payer: MEDICARE

## 2017-06-24 PROBLEM — E87.6 HYPOKALEMIA: Status: ACTIVE | Noted: 2017-06-24

## 2017-06-24 PROBLEM — D64.9 ANEMIA: Status: ACTIVE | Noted: 2017-06-24

## 2017-06-24 LAB
ANION GAP SERPL CALC-SCNC: 5 MMOL/L (ref 0–11.9)
BASE EXCESS BLDA CALC-SCNC: -4 MMOL/L (ref -4–3)
BASE EXCESS BLDA CALC-SCNC: 0 MMOL/L (ref -4–3)
BASOPHILS # BLD AUTO: 0.1 % (ref 0–1.8)
BASOPHILS # BLD: 0.01 K/UL (ref 0–0.12)
BODY TEMPERATURE: ABNORMAL DEGREES
BODY TEMPERATURE: ABNORMAL DEGREES
BUN SERPL-MCNC: 10 MG/DL (ref 8–22)
CALCIUM SERPL-MCNC: 7.1 MG/DL (ref 8.5–10.5)
CHLORIDE SERPL-SCNC: 113 MMOL/L (ref 96–112)
CO2 BLDA-SCNC: 22 MMOL/L (ref 20–33)
CO2 BLDA-SCNC: 26 MMOL/L (ref 20–33)
CO2 SERPL-SCNC: 21 MMOL/L (ref 20–33)
CREAT SERPL-MCNC: 0.5 MG/DL (ref 0.5–1.4)
EOSINOPHIL # BLD AUTO: 0.02 K/UL (ref 0–0.51)
EOSINOPHIL NFR BLD: 0.2 % (ref 0–6.9)
ERYTHROCYTE [DISTWIDTH] IN BLOOD BY AUTOMATED COUNT: 50.4 FL (ref 35.9–50)
GFR SERPL CREATININE-BSD FRML MDRD: >60 ML/MIN/1.73 M 2
GLUCOSE BLD-MCNC: 136 MG/DL (ref 65–99)
GLUCOSE BLD-MCNC: 164 MG/DL (ref 65–99)
GLUCOSE BLD-MCNC: 95 MG/DL (ref 65–99)
GLUCOSE SERPL-MCNC: 132 MG/DL (ref 65–99)
GRAM STN SPEC: NORMAL
HCO3 BLDA-SCNC: 21 MMOL/L (ref 17–25)
HCO3 BLDA-SCNC: 24.5 MMOL/L (ref 17–25)
HCT VFR BLD AUTO: 23.3 % (ref 42–52)
HGB BLD-MCNC: 7.9 G/DL (ref 14–18)
IMM GRANULOCYTES # BLD AUTO: 0.03 K/UL (ref 0–0.11)
IMM GRANULOCYTES NFR BLD AUTO: 0.3 % (ref 0–0.9)
LACTATE BLD-SCNC: 1.8 MMOL/L (ref 0.5–2)
LYMPHOCYTES # BLD AUTO: 0.68 K/UL (ref 1–4.8)
LYMPHOCYTES NFR BLD: 7.2 % (ref 22–41)
MAGNESIUM SERPL-MCNC: 2 MG/DL (ref 1.5–2.5)
MCH RBC QN AUTO: 32.9 PG (ref 27–33)
MCHC RBC AUTO-ENTMCNC: 33.3 G/DL (ref 33.7–35.3)
MCV RBC AUTO: 98.7 FL (ref 81.4–97.8)
MONOCYTES # BLD AUTO: 0.57 K/UL (ref 0–0.85)
MONOCYTES NFR BLD AUTO: 6 % (ref 0–13.4)
NEUTROPHILS # BLD AUTO: 8.17 K/UL (ref 1.82–7.42)
NEUTROPHILS NFR BLD: 86.2 % (ref 44–72)
NRBC # BLD AUTO: 0 K/UL
NRBC BLD AUTO-RTO: 0 /100 WBC
O2/TOTAL GAS SETTING VFR VENT: 30 %
O2/TOTAL GAS SETTING VFR VENT: 70 %
PCO2 BLDA: 38.7 MMHG (ref 26–37)
PCO2 BLDA: 39.6 MMHG (ref 26–37)
PCO2 TEMP ADJ BLDA: 40.1 MMHG (ref 26–37)
PCO2 TEMP ADJ BLDA: 43.2 MMHG (ref 26–37)
PH BLDA: 7.34 [PH] (ref 7.4–7.5)
PH BLDA: 7.4 [PH] (ref 7.4–7.5)
PH TEMP ADJ BLDA: 7.33 [PH] (ref 7.4–7.5)
PH TEMP ADJ BLDA: 7.37 [PH] (ref 7.4–7.5)
PHOSPHATE SERPL-MCNC: 1.7 MG/DL (ref 2.5–4.5)
PLATELET # BLD AUTO: 113 K/UL (ref 164–446)
PMV BLD AUTO: 10.4 FL (ref 9–12.9)
PO2 BLDA: 104 MMHG (ref 64–87)
PO2 BLDA: 72 MMHG (ref 64–87)
PO2 TEMP ADJ BLDA: 117 MMHG (ref 64–87)
PO2 TEMP ADJ BLDA: 76 MMHG (ref 64–87)
POTASSIUM SERPL-SCNC: 3.3 MMOL/L (ref 3.6–5.5)
RBC # BLD AUTO: 2.37 M/UL (ref 4.7–6.1)
SAO2 % BLDA: 93 % (ref 93–99)
SAO2 % BLDA: 98 % (ref 93–99)
SIGNIFICANT IND 70042: NORMAL
SITE SITE: NORMAL
SODIUM SERPL-SCNC: 139 MMOL/L (ref 135–145)
SOURCE SOURCE: NORMAL
SPECIMEN DRAWN FROM PATIENT: ABNORMAL
SPECIMEN DRAWN FROM PATIENT: ABNORMAL
WBC # BLD AUTO: 9.5 K/UL (ref 4.8–10.8)

## 2017-06-24 PROCEDURE — 700111 HCHG RX REV CODE 636 W/ 250 OVERRIDE (IP): Performed by: HOSPITALIST

## 2017-06-24 PROCEDURE — A9270 NON-COVERED ITEM OR SERVICE: HCPCS | Performed by: INTERNAL MEDICINE

## 2017-06-24 PROCEDURE — 5A1955Z RESPIRATORY VENTILATION, GREATER THAN 96 CONSECUTIVE HOURS: ICD-10-PCS | Performed by: INTERNAL MEDICINE

## 2017-06-24 PROCEDURE — 87077 CULTURE AEROBIC IDENTIFY: CPT | Mod: 91

## 2017-06-24 PROCEDURE — 700101 HCHG RX REV CODE 250: Performed by: PHARMACIST

## 2017-06-24 PROCEDURE — 700111 HCHG RX REV CODE 636 W/ 250 OVERRIDE (IP)

## 2017-06-24 PROCEDURE — 700111 HCHG RX REV CODE 636 W/ 250 OVERRIDE (IP): Performed by: INTERNAL MEDICINE

## 2017-06-24 PROCEDURE — 82962 GLUCOSE BLOOD TEST: CPT | Mod: 91

## 2017-06-24 PROCEDURE — 85025 COMPLETE CBC W/AUTO DIFF WBC: CPT

## 2017-06-24 PROCEDURE — 700101 HCHG RX REV CODE 250: Performed by: INTERNAL MEDICINE

## 2017-06-24 PROCEDURE — 31500 INSERT EMERGENCY AIRWAY: CPT | Performed by: INTERNAL MEDICINE

## 2017-06-24 PROCEDURE — 71010 DX-CHEST-PORTABLE (1 VIEW): CPT

## 2017-06-24 PROCEDURE — 82803 BLOOD GASES ANY COMBINATION: CPT | Mod: 91

## 2017-06-24 PROCEDURE — 94640 AIRWAY INHALATION TREATMENT: CPT

## 2017-06-24 PROCEDURE — 99291 CRITICAL CARE FIRST HOUR: CPT | Mod: 25 | Performed by: INTERNAL MEDICINE

## 2017-06-24 PROCEDURE — 84100 ASSAY OF PHOSPHORUS: CPT

## 2017-06-24 PROCEDURE — 31645 BRNCHSC W/THER ASPIR 1ST: CPT | Performed by: INTERNAL MEDICINE

## 2017-06-24 PROCEDURE — 700102 HCHG RX REV CODE 250 W/ 637 OVERRIDE(OP): Performed by: HOSPITALIST

## 2017-06-24 PROCEDURE — A9270 NON-COVERED ITEM OR SERVICE: HCPCS | Performed by: HOSPITALIST

## 2017-06-24 PROCEDURE — 36600 WITHDRAWAL OF ARTERIAL BLOOD: CPT

## 2017-06-24 PROCEDURE — 302978 HCHG BRONCHOSCOPY-DIAGNOSTIC

## 2017-06-24 PROCEDURE — 87186 SC STD MICRODIL/AGAR DIL: CPT | Mod: 91

## 2017-06-24 PROCEDURE — 700105 HCHG RX REV CODE 258: Performed by: HOSPITALIST

## 2017-06-24 PROCEDURE — 700105 HCHG RX REV CODE 258: Performed by: PHARMACIST

## 2017-06-24 PROCEDURE — 0B968ZZ DRAINAGE OF RIGHT LOWER LOBE BRONCHUS, VIA NATURAL OR ARTIFICIAL OPENING ENDOSCOPIC: ICD-10-PCS | Performed by: INTERNAL MEDICINE

## 2017-06-24 PROCEDURE — 80048 BASIC METABOLIC PNL TOTAL CA: CPT

## 2017-06-24 PROCEDURE — 94150 VITAL CAPACITY TEST: CPT

## 2017-06-24 PROCEDURE — 87205 SMEAR GRAM STAIN: CPT

## 2017-06-24 PROCEDURE — 83735 ASSAY OF MAGNESIUM: CPT

## 2017-06-24 PROCEDURE — 700102 HCHG RX REV CODE 250 W/ 637 OVERRIDE(OP): Performed by: INTERNAL MEDICINE

## 2017-06-24 PROCEDURE — 700105 HCHG RX REV CODE 258

## 2017-06-24 PROCEDURE — 0B9B8ZZ DRAINAGE OF LEFT LOWER LOBE BRONCHUS, VIA NATURAL OR ARTIFICIAL OPENING ENDOSCOPIC: ICD-10-PCS | Performed by: INTERNAL MEDICINE

## 2017-06-24 PROCEDURE — 36620 INSERTION CATHETER ARTERY: CPT

## 2017-06-24 PROCEDURE — 87070 CULTURE OTHR SPECIMN AEROBIC: CPT

## 2017-06-24 PROCEDURE — 99233 SBSQ HOSP IP/OBS HIGH 50: CPT | Performed by: HOSPITALIST

## 2017-06-24 PROCEDURE — 302154 K THERMIA BLANKET 24X60: Performed by: HOSPITALIST

## 2017-06-24 PROCEDURE — 0BH17EZ INSERTION OF ENDOTRACHEAL AIRWAY INTO TRACHEA, VIA NATURAL OR ARTIFICIAL OPENING: ICD-10-PCS | Performed by: INTERNAL MEDICINE

## 2017-06-24 PROCEDURE — 770022 HCHG ROOM/CARE - ICU (200)

## 2017-06-24 PROCEDURE — 76937 US GUIDE VASCULAR ACCESS: CPT

## 2017-06-24 PROCEDURE — 83605 ASSAY OF LACTIC ACID: CPT

## 2017-06-24 PROCEDURE — 94003 VENT MGMT INPAT SUBQ DAY: CPT

## 2017-06-24 RX ORDER — ACETAMINOPHEN 325 MG/1
650 TABLET ORAL EVERY 4 HOURS PRN
Status: DISCONTINUED | OUTPATIENT
Start: 2017-06-24 | End: 2017-07-12 | Stop reason: HOSPADM

## 2017-06-24 RX ORDER — LORAZEPAM 2 MG/ML
1-2 INJECTION INTRAMUSCULAR EVERY 4 HOURS PRN
Status: DISCONTINUED | OUTPATIENT
Start: 2017-06-24 | End: 2017-07-09

## 2017-06-24 RX ORDER — BISACODYL 10 MG
10 SUPPOSITORY, RECTAL RECTAL
Status: DISCONTINUED | OUTPATIENT
Start: 2017-06-24 | End: 2017-06-24

## 2017-06-24 RX ORDER — POLYETHYLENE GLYCOL 3350 17 G/17G
1 POWDER, FOR SOLUTION ORAL
Status: DISCONTINUED | OUTPATIENT
Start: 2017-06-24 | End: 2017-06-24

## 2017-06-24 RX ORDER — PHENOBARBITAL SODIUM 130 MG/ML
260 INJECTION INTRAMUSCULAR; INTRAVENOUS
Status: DISCONTINUED | OUTPATIENT
Start: 2017-06-24 | End: 2017-06-26

## 2017-06-24 RX ORDER — CHLORHEXIDINE GLUCONATE ORAL RINSE 1.2 MG/ML
15 SOLUTION DENTAL 2 TIMES DAILY
Status: DISCONTINUED | OUTPATIENT
Start: 2017-06-24 | End: 2017-06-24

## 2017-06-24 RX ORDER — OXYCODONE HYDROCHLORIDE 5 MG/1
5 TABLET ORAL EVERY 4 HOURS PRN
Status: DISCONTINUED | OUTPATIENT
Start: 2017-06-24 | End: 2017-07-12 | Stop reason: HOSPADM

## 2017-06-24 RX ORDER — AMOXICILLIN 250 MG
2 CAPSULE ORAL 2 TIMES DAILY
Status: DISCONTINUED | OUTPATIENT
Start: 2017-06-24 | End: 2017-06-24

## 2017-06-24 RX ORDER — IPRATROPIUM BROMIDE AND ALBUTEROL SULFATE 2.5; .5 MG/3ML; MG/3ML
3 SOLUTION RESPIRATORY (INHALATION)
Status: DISCONTINUED | OUTPATIENT
Start: 2017-06-24 | End: 2017-07-12 | Stop reason: HOSPADM

## 2017-06-24 RX ORDER — ROCURONIUM BROMIDE 10 MG/ML
50 INJECTION, SOLUTION INTRAVENOUS ONCE
Status: COMPLETED | OUTPATIENT
Start: 2017-06-24 | End: 2017-06-24

## 2017-06-24 RX ORDER — MAGNESIUM SULFATE HEPTAHYDRATE 40 MG/ML
2 INJECTION, SOLUTION INTRAVENOUS ONCE
Status: COMPLETED | OUTPATIENT
Start: 2017-06-24 | End: 2017-06-24

## 2017-06-24 RX ORDER — LIDOCAINE HYDROCHLORIDE 10 MG/ML
1-2 INJECTION, SOLUTION INFILTRATION; PERINEURAL
Status: DISCONTINUED | OUTPATIENT
Start: 2017-06-24 | End: 2017-06-24

## 2017-06-24 RX ORDER — PROPOFOL 10 MG/ML
100 INJECTION, EMULSION INTRAVENOUS ONCE
Status: COMPLETED | OUTPATIENT
Start: 2017-06-24 | End: 2017-06-24

## 2017-06-24 RX ORDER — OXYCODONE HYDROCHLORIDE 10 MG/1
10 TABLET ORAL EVERY 4 HOURS PRN
Status: DISCONTINUED | OUTPATIENT
Start: 2017-06-24 | End: 2017-07-12 | Stop reason: HOSPADM

## 2017-06-24 RX ORDER — PHENOBARBITAL SODIUM 130 MG/ML
130 INJECTION INTRAMUSCULAR; INTRAVENOUS
Status: DISCONTINUED | OUTPATIENT
Start: 2017-06-24 | End: 2017-06-26

## 2017-06-24 RX ORDER — ACETAMINOPHEN 650 MG/1
650 SUPPOSITORY RECTAL EVERY 6 HOURS PRN
Status: DISCONTINUED | OUTPATIENT
Start: 2017-06-24 | End: 2017-07-12 | Stop reason: HOSPADM

## 2017-06-24 RX ORDER — KETOROLAC TROMETHAMINE 4 MG/ML
1 SOLUTION/ DROPS OPHTHALMIC TWICE DAILY
Status: DISCONTINUED | OUTPATIENT
Start: 2017-06-24 | End: 2017-06-29

## 2017-06-24 RX ORDER — OXYCODONE HYDROCHLORIDE 5 MG/1
5-10 TABLET ORAL EVERY 4 HOURS PRN
Status: DISCONTINUED | OUTPATIENT
Start: 2017-06-24 | End: 2017-06-24

## 2017-06-24 RX ORDER — IPRATROPIUM BROMIDE AND ALBUTEROL SULFATE 2.5; .5 MG/3ML; MG/3ML
3 SOLUTION RESPIRATORY (INHALATION)
Status: DISCONTINUED | OUTPATIENT
Start: 2017-06-24 | End: 2017-07-03

## 2017-06-24 RX ADMIN — SODIUM CHLORIDE: 9 INJECTION, SOLUTION INTRAVENOUS at 17:49

## 2017-06-24 RX ADMIN — AMPICILLIN SODIUM AND SULBACTAM SODIUM 3 G: 2; 1 INJECTION, POWDER, FOR SOLUTION INTRAMUSCULAR; INTRAVENOUS at 05:10

## 2017-06-24 RX ADMIN — SENNOSIDES AND DOCUSATE SODIUM 2 TABLET: 8.6; 5 TABLET ORAL at 08:46

## 2017-06-24 RX ADMIN — PROPOFOL 20 MCG/KG/MIN: 10 INJECTION, EMULSION INTRAVENOUS at 11:23

## 2017-06-24 RX ADMIN — LORAZEPAM 1 MG: 2 INJECTION INTRAMUSCULAR; INTRAVENOUS at 12:48

## 2017-06-24 RX ADMIN — IPRATROPIUM BROMIDE AND ALBUTEROL SULFATE 3 ML: .5; 3 SOLUTION RESPIRATORY (INHALATION) at 02:24

## 2017-06-24 RX ADMIN — IPRATROPIUM BROMIDE AND ALBUTEROL SULFATE 3 ML: .5; 3 SOLUTION RESPIRATORY (INHALATION) at 06:59

## 2017-06-24 RX ADMIN — CHLORHEXIDINE GLUCONATE 15 ML: 1.2 RINSE ORAL at 20:04

## 2017-06-24 RX ADMIN — PROPOFOL 30 MCG/KG/MIN: 10 INJECTION, EMULSION INTRAVENOUS at 23:53

## 2017-06-24 RX ADMIN — FENTANYL CITRATE 50 MCG: 50 INJECTION, SOLUTION INTRAMUSCULAR; INTRAVENOUS at 16:31

## 2017-06-24 RX ADMIN — IPRATROPIUM BROMIDE AND ALBUTEROL SULFATE 3 ML: .5; 3 SOLUTION RESPIRATORY (INHALATION) at 22:27

## 2017-06-24 RX ADMIN — KETOROLAC TROMETHAMINE 1 DROP: 4 SOLUTION/ DROPS OPHTHALMIC at 15:30

## 2017-06-24 RX ADMIN — SODIUM CHLORIDE: 9 INJECTION, SOLUTION INTRAVENOUS at 02:12

## 2017-06-24 RX ADMIN — MAGNESIUM SULFATE IN WATER 2 G: 40 INJECTION, SOLUTION INTRAVENOUS at 12:13

## 2017-06-24 RX ADMIN — AMPICILLIN SODIUM AND SULBACTAM SODIUM 3 G: 2; 1 INJECTION, POWDER, FOR SOLUTION INTRAMUSCULAR; INTRAVENOUS at 17:50

## 2017-06-24 RX ADMIN — AMPICILLIN SODIUM AND SULBACTAM SODIUM 3 G: 2; 1 INJECTION, POWDER, FOR SOLUTION INTRAMUSCULAR; INTRAVENOUS at 12:11

## 2017-06-24 RX ADMIN — PROPOFOL 40 MCG/KG/MIN: 10 INJECTION, EMULSION INTRAVENOUS at 05:08

## 2017-06-24 RX ADMIN — FAMOTIDINE 20 MG: 20 TABLET, FILM COATED ORAL at 08:46

## 2017-06-24 RX ADMIN — THERA TABS 1 TABLET: TAB at 08:45

## 2017-06-24 RX ADMIN — PROPOFOL 100 MG: 10 INJECTION, EMULSION INTRAVENOUS at 18:24

## 2017-06-24 RX ADMIN — FAMOTIDINE 20 MG: 10 INJECTION INTRAVENOUS at 20:04

## 2017-06-24 RX ADMIN — ROCURONIUM BROMIDE 50 MG: 10 INJECTION, SOLUTION INTRAVENOUS at 18:24

## 2017-06-24 RX ADMIN — CHLORHEXIDINE GLUCONATE 15 ML: 1.2 RINSE ORAL at 08:45

## 2017-06-24 RX ADMIN — FOLIC ACID 1 MG: 1 TABLET ORAL at 08:46

## 2017-06-24 RX ADMIN — ACETAMINOPHEN 650 MG: 650 SUPPOSITORY RECTAL at 19:55

## 2017-06-24 RX ADMIN — ENOXAPARIN SODIUM 40 MG: 100 INJECTION SUBCUTANEOUS at 08:45

## 2017-06-24 RX ADMIN — POTASSIUM PHOSPHATE, MONOBASIC AND POTASSIUM PHOSPHATE, DIBASIC 30 MMOL: 224; 236 INJECTION, SOLUTION INTRAVENOUS at 09:01

## 2017-06-24 RX ADMIN — SODIUM CHLORIDE: 9 INJECTION, SOLUTION INTRAVENOUS at 13:27

## 2017-06-24 RX ADMIN — PROPOFOL 5 MCG/KG/MIN: 10 INJECTION, EMULSION INTRAVENOUS at 18:58

## 2017-06-24 RX ADMIN — AMPICILLIN SODIUM AND SULBACTAM SODIUM 3 G: 2; 1 INJECTION, POWDER, FOR SOLUTION INTRAMUSCULAR; INTRAVENOUS at 23:53

## 2017-06-24 RX ADMIN — IPRATROPIUM BROMIDE AND ALBUTEROL SULFATE 3 ML: .5; 3 SOLUTION RESPIRATORY (INHALATION) at 10:59

## 2017-06-24 RX ADMIN — PHENOBARBITAL SODIUM 753 MG: 130 INJECTION INTRAMUSCULAR; INTRAVENOUS at 14:59

## 2017-06-24 RX ADMIN — INSULIN LISPRO 2 UNITS: 100 INJECTION, SOLUTION INTRAVENOUS; SUBCUTANEOUS at 12:19

## 2017-06-24 RX ADMIN — PROPOFOL 35 MCG/KG/MIN: 10 INJECTION, EMULSION INTRAVENOUS at 00:11

## 2017-06-24 RX ADMIN — Medication 100 MG: at 08:45

## 2017-06-24 RX ADMIN — PHENOBARBITAL SODIUM 260 MG: 130 INJECTION INTRAMUSCULAR; INTRAVENOUS at 14:20

## 2017-06-24 ASSESSMENT — LIFESTYLE VARIABLES: EVER_SMOKED: YES

## 2017-06-24 ASSESSMENT — PULMONARY FUNCTION TESTS: FVC: 1.2

## 2017-06-24 ASSESSMENT — PAIN SCALES - GENERAL: PAINLEVEL_OUTOF10: 0

## 2017-06-24 NOTE — PROGRESS NOTES
"Trauma Progress Note:    Extubated successfully. Denies neck pain, back pain, or abdominal pain.     PE:  /62 mmHg  Pulse 113  Temp(Src) 36.9 °C (98.4 °F)  Resp 21  Ht 1.803 m (5' 11\")  Wt 87.5 kg (192 lb 14.4 oz)  BMI 26.92 kg/m2  SpO2 92%    I/O:   Intake/Output Summary (Last 24 hours) at 06/24/17 1233  Last data filed at 06/24/17 0800   Gross per 24 hour   Intake 3238.98 ml   Output   1390 ml   Net 1848.98 ml         GEN: NAD, face mask, slightly hoarse  COR: Tachy, regular  PULM: Coarse bilateral breath sounds  ABD: soft, NT, ND  EXT: WWP, no pain on ROM of ankles, knees, hips, elbows, wrists, shoulders    Labs:  Recent Labs      06/23/17   0421  06/24/17   0330   WBC  20.8*  9.5   RBC  3.32*  2.37*   HEMOGLOBIN  11.1*  7.9*   HEMATOCRIT  33.2*  23.3*   MCV  100.0*  98.7*   MCH  33.4*  32.9   RDW  48.7  50.4*   PLATELETCT  162*  113*   MPV  10.1  10.4   NEUTSPOLYS   --   86.20*   LYMPHOCYTES   --   7.20*   MONOCYTES   --   6.00   EOSINOPHILS   --   0.20   BASOPHILS   --   0.10     Recent Labs      06/23/17   0421  06/24/17   0330   SODIUM  133*  139   POTASSIUM  3.8  3.3*   CHLORIDE  104  113*   CO2  16*  21   GLUCOSE  134*  132*   BUN  13  10         A/P: GLF, lip lac with aspiration.   CXR looks like he aspirated - tenuous respiratory status at this time  Limited tertiary exam completed.  No neck pain on exam. OOB, no activity limitations.  H/h dropped - he reportedly bled a lot a the time of his lip laceration and this may be a sign of his resuscitation. Follow h/h and would hold Lovenox until h/h stabilizes out.   Will follow peripherally.     Devang Gaspar MD  [facility]  [phone]      "

## 2017-06-24 NOTE — CARE PLAN
Problem: Respiratory:  Goal: Respiratory status will improve  Outcome: PROGRESSING AS EXPECTED  Patient weaned from 40 to 30 percent fio2 on ventilator

## 2017-06-24 NOTE — PROGRESS NOTES
[facility] Hospitalist Progress Note    Date of Service: 2017    Chief Complaint  77 y.o. male admitted 2017 with alcohol abuse admitted after fall and intubated in ED.    Interval Problem Update  Sedated on ventilator in ICU during am ICU MDR.  He was extubated early afternoon and then had some increase agitation requiring restraints.  Urine 940cc out overnight.  Mobilized to edge of bed overnight. Care discussed with his wife.    Consultants/Specialty  Pulmonary    Disposition  Watch for withdrawals        Review of Systems   Unable to perform ROS: acuity of condition      Physical Exam  Laboratory/Imaging   Hemodynamics  Temp (24hrs), Av.8 °C (98.2 °F), Min:36.7 °C (98 °F), Max:36.9 °C (98.4 °F)   Temperature: 36.9 °C (98.4 °F), Monitored Temp: 37.7 °C (99.9 °F)  Pulse  Av.7  Min: 72  Max: 97 Heart Rate (Monitored): 83  NIBP: 112/54 mmHg      Respiratory  [place] Vent Mode: APVCMV, Rate (breaths/min): 20, PEEP/CPAP: 8, PEEP/CPAP: 8, FiO2: 30, P Peak (PIP): 22, P MEAN: 12   Respiration: 19, Pulse Oximetry: 99 %     Work Of Breathing / Effort: Vented  RUL Breath Sounds: Clear, RML Breath Sounds: Clear;Diminished, RLL Breath Sounds: Diminished, LION Breath Sounds: Clear, LLL Breath Sounds: Diminished    Fluids    Intake/Output Summary (Last 24 hours) at 17 0658  Last data filed at 17 0600   Gross per 24 hour   Intake 6499.78 ml   Output   1815 ml   Net 4684.78 ml       Nutrition  Orders Placed This Encounter   Procedures   • Diet NPO     Standing Status: Standing      Number of Occurrences: 1      Standing Expiration Date:      Order Specific Question:  Type:     Answer:  Now [1]     Order Specific Question:  Restrict to:     Answer:  Strict [1]     Physical Exam   Constitutional: He appears well-developed. No distress.   obese   HENT:   Head: Normocephalic and atraumatic.   Nose: Nose normal.   Eyes: Conjunctivae and EOM are normal. Right eye exhibits no discharge. Left eye exhibits no  discharge. No scleral icterus.   Neck: No tracheal deviation present.   Cardiovascular: Normal rate, regular rhythm, normal heart sounds and intact distal pulses.    No murmur heard.  Pulmonary/Chest: Effort normal and breath sounds normal. No stridor. No respiratory distress. He has no wheezes.   Abdominal: Soft. Bowel sounds are normal. He exhibits no distension. There is no tenderness.   Musculoskeletal: He exhibits no edema.   Neurological: He is alert. No cranial nerve deficit.   Skin: Skin is warm. He is not diaphoretic.   Psychiatric: He is agitated. Cognition and memory are impaired. He expresses impulsivity and inappropriate judgment. He is noncommunicative.   Vitals reviewed.      Recent Labs      06/23/17   0421  06/24/17   0330   WBC  20.8*  9.5   RBC  3.32*  2.37*   HEMOGLOBIN  11.1*  7.9*   HEMATOCRIT  33.2*  23.3*   MCV  100.0*  98.7*   MCH  33.4*  32.9   MCHC  33.4*  33.3*   RDW  48.7  50.4*   PLATELETCT  162*  113*   MPV  10.1  10.4     Recent Labs      06/23/17   0421  06/24/17   0330   SODIUM  133*  139   POTASSIUM  3.8  3.3*   CHLORIDE  104  113*   CO2  16*  21   GLUCOSE  134*  132*   BUN  13  10   CREATININE  0.58  0.50   CALCIUM  8.1*  7.1*     Recent Labs      06/23/17   0421   APTT  26.3   INR  1.08                  Assessment/Plan     Aspiration pneumonitis (CMS-HCC)  Assessment & Plan  Intubated.  abx  Monitor vitals, I/Os    Respiratory failure (CMS-HCC)  Assessment & Plan  Pulmonary consulting.  Monitor ABG, CXR, vitals, I/O's  Wean once not sign of EtOH w/d.    Alcohol abuse (present on admission)  Assessment & Plan  Watch for w/d.      Radiology images reviewed, Labs reviewed and Medications reviewed  Herring catheter: Critically Ill - Requiring Accurate Measurement of Urinary Output and Unconscious / Sedated Patient on a Ventilator

## 2017-06-24 NOTE — PROGRESS NOTES
12 hour chart check      Patient has been in normal sinus rhythm with rates 70-90's, no ectopy noted  KY 0.12   QRS 0.1  QT 0.34

## 2017-06-24 NOTE — PROGRESS NOTES
Patient extubated with respiratory and MD Bryan at bedside. Placed on nasal cannula then switched to oxymask 5L for mouth breathing. Patient agitated, pulling at oxymask, trying to get out off bed. Orders received for ativan see MAR.

## 2017-06-24 NOTE — PROGRESS NOTES
Pulmonary Critical Care Progress Note        Date of Service: 6/24/2017      Chief Complaint: GLF and ETOH    History of Present Illness:   This is a 77-year-old male admitted to the ICU through the ED on 6/23/17 for acute alcohol intoxication and ground level fall. Patient intubated in the ED.       ROS:  Respiratory: unable to perform due to the patient's inability to effectively communicate, Cardiac: unable to perform due to the patient's inability to effectively communicate, GI: unable to perform due to the patient's inability to effectively communicate.  All other systems negative.      Interval Events:  24 hour interval history reviewed   No significant overnight events  Following, Prop and Fent gtts  SR, BP 90-120s  T max 37.9  Peptamen intense, TF at goal, no BM yet   cc overnight  Vent day 2, SBT for one hour VC 1.2, NiF -32, RSBI 33  CXR mild basilar infiltrates minimal pulmonary vascular congestion, tube and lines in good position  Mobilizes to edge of bed  ERP    Plan:  Extubate today    PFSH:  No change.    Physical Exam:  General:   Sedate on Prop, arouses  HEENT:  PERRL, Facial laceration noted unchanged, left nare cortrak TF at 45, ETT in place, neck supple, trachea midline,   CVS:  Distant, tachycardic, regular  Respiratory:  Scattered coarse rhonchi, moderately diminished, no wheezing  Abdomen:  Soft, NT, bowel sounds present  Extremities:  No edema, 2+ distal pulses  Skin:  Warm, dry  Neuro/Psych:  Sedate on Prop, arouses, weakly follows, moves all extremities symmetrically    Respiratory:  Carbajal Vent Mode: APVCMV, Rate (breaths/min): 20, Vt Target (mL): 460, PEEP/CPAP: 8, FiO2: 30, Static Compliance (ml / cm H2O): 49, Control VTE (exp VT): 468  Pulse Oximetry: 99 %  ImagingAvailable data reviewed     Recent Labs      06/23/17   0423  06/24/17   0424   ISTATAPH  7.264*  7.342*   ISTATAPCO2  39.0*  38.7*   ISTATAPO2  106*  72   ISTATATCO2  19*  22   [id number]  97  93   ISTATARTHCO3   17.6  21.0   ISTATARTBE  -9*  -4   ISTATTEMP  see below  37.8 C   ISTATFIO2  40  30   ISTATSPEC  Arterial  Arterial   ISTATAPHTC   --   7.330*   [id number]   --   76       HemoDynamics:  Pulse: 82, Heart Rate (Monitored): 74  NIBP: 112/54 mmHg     Imaging: Available data reviewed      Neuro:  GCS Total [employer] Coma Score: 9  Imaging: Available data reviewed      Fluids:  Intake/Output       06/22/17 0700 - 06/23/17 0659 (Not Admitted) 06/23/17 0700 - 06/24/17 0659 06/24/17 0700 - 06/25/17 0659      9288-0052 6837-0100 Total 3587-9863 0373-1508 Total 8108-9613 2008-3981 Total       Intake    I.V.  --  2000 2000  4475.6  1449.2 5924.8  --  -- --    Pre-Hospital Volume -- 2000 2000 -- -- -- -- -- --    Trauma Resuscitation Volume -- 0 0 -- -- -- -- -- --    Propofol Volume -- -- -- 475.6 249.2 724.8 -- -- --    IV Volume (Normal saline) -- -- -- 4000 1200 5200 -- -- --    Blood  --  0 0  --  -- --  --  -- --    PRBC Total Volume (Non-Barcoded) -- 0 0 -- -- -- -- -- --    FFP Total Volume (Non-Barcoded) -- 0 0 -- -- -- -- -- --    Platelets Total Volume (Non-Barcoded) -- 0 0 -- -- -- -- -- --    Cryoprecipitate (Pooled) Total Volume (Non-Barcoded) -- 0 0 -- -- -- -- -- --    Cryoprecipitate (Single) Total Volume (Non-Barcoded) -- 0 0 -- -- -- -- -- --    Other  --  -- --  60  60 120  --  -- --    Medications (P.O./ Enteral Liquids) -- -- -- 60 60 120 -- -- --    Enteral  --  -- --  120  335 455  --  -- --    Enteral Volume -- -- -- -- 275 275 -- -- --    Free Water / Tube Flush -- -- -- 120 60 180 -- -- --    Total Intake -- 2000 2000 4655.6 1844.2 6499.8 -- -- --       Output    Urine  --  -- --  875  940 1815  --  -- --    Indwelling Cathether -- -- --  -- -- --    Other  --  0 0  --  -- --  --  -- --    Pre-Hospital Output -- 0 0 -- -- -- -- -- --    Trauma Resuscitation Output -- 0 0 -- -- -- -- -- --    Blood  --  0 0  --  -- --  --  -- --    Est. Blood Loss (mL) -- 0 0 -- -- -- -- -- --    Total  Output -- 0 0  -- -- --       Net I/O     -- 2000 3780.6 904.2 4684.8 -- -- --        Weight: 87.5 kg (192 lb 14.4 oz)  Recent Labs      17   1030  17   0330   SODIUM  133*   --   139   POTASSIUM  3.8   --   3.3*   CHLORIDE  104   --   113*   CO2  16*   --   21   BUN  13   --   10   CREATININE  0.58   --   0.50   MAGNESIUM   --   1.5  2.0   PHOSPHORUS   --   2.6  1.7*   CALCIUM  8.1*   --   7.1*       GI/Nutrition:  Imaging: Available data reviewed  NPO       Liver Function  Recent Labs      17   ALTSGPT  18   --    ASTSGOT  37   --    ALKPHOSPHAT  69   --    TBILIRUBIN  0.5   --    GLUCOSE  134*  132*       Heme:  Recent Labs      17   033   RBC  3.32*  2.37*   HEMOGLOBIN  11.1*  7.9*   HEMATOCRIT  33.2*  23.3*   PLATELETCT  162*  113*   PROTHROMBTM  14.3   --    APTT  26.3   --    INR  1.08   --        Infectious Disease:  Temp  Av.8 °C (98.2 °F)  Min: 36.7 °C (98 °F)  Max: 36.9 °C (98.4 °F)  Monitored Temp  Av.7 °C (99.8 °F)  Min: 37.4 °C (99.3 °F)  Max: 37.9 °C (100.2 °F)  Micro: reviewed     Recent Labs      17   033   WBC  20.8*  9.5   NEUTSPOLYS   --   86.20*   LYMPHOCYTES   --   7.20*   MONOCYTES   --   6.00   EOSINOPHILS   --   0.20   BASOPHILS   --   0.10   ASTSGOT  37   --    ALTSGPT  18   --    ALKPHOSPHAT  69   --    TBILIRUBIN  0.5   --      Current Facility-Administered Medications   Medication Dose Frequency Provider Last Rate Last Dose   • insulin lispro (HUMALOG) injection 2-9 Units  2-9 Units Q6HRS Tudne Panchal M.D.   Stopped at 17 0802   • NS infusion   Continuous Tunde Panchal M.D. 100 mL/hr at 17 0212     • ampicillin/sulbactam (UNASYN) 3 g in  mL IVPB  3 g Q6HRS Tunde Panchal M.D.   Stopped at 17 0540   • glucose 4 g chewable tablet 16 g  16 g Q15 MIN PRN Kaya Gonzalez, PHARMD       • dextrose 50% (D50W) injection 25 mL  25 mL  Q15 MIN PRN KENDALL KoD       • thiamine (THIAMINE) tablet 100 mg  100 mg DAILY Tunde Panchal M.D.   100 mg at 06/23/17 1058   • folic acid (FOLVITE) tablet 1 mg  1 mg DAILY Tunde Panchal M.D.   1 mg at 06/23/17 1058   • multivitamin (THERAGRAN) tablet 1 Tab  1 Tab DAILY Pedro Vivas M.D.   1 Tab at 06/23/17 1058   • Respiratory Care per Protocol   Continuous RT Pedro Vivas M.D.       • senna-docusate (PERICOLACE or SENOKOT S) 8.6-50 MG per tablet 2 Tab  2 Tab BID Pedro Vivas M.D.   2 Tab at 06/23/17 2112    And   • polyethylene glycol/lytes (MIRALAX) PACKET 1 Packet  1 Packet QDAY PRN Pedro Vivas M.D.        And   • magnesium hydroxide (MILK OF MAGNESIA) suspension 30 mL  30 mL QDAY PRN Pedro Vivas M.D.        And   • bisacodyl (DULCOLAX) suppository 10 mg  10 mg QDAY PRN Pedro Vivas M.D.       • chlorhexidine (PERIDEX) 0.12 % solution 15 mL  15 mL BID Pedro Vivas M.D.   15 mL at 06/23/17 2112   • lidocaine (XYLOCAINE) 1%  injection  1-2 mL Q30 MIN PRN Pedro Vivas M.D.       • MD ALERT...Adult ICU Electrolyte Replacement per Pharmacy Protocol   pharmacy to dose Pedro Vivas M.D.       • enoxaparin (LOVENOX) inj 40 mg  40 mg DAILY Pedro Vivas M.D.   40 mg at 06/23/17 0809   • fentaNYL (SUBLIMAZE) injection 25 mcg  25 mcg Q HOUR PRN Pedro Vivas M.D.        Or   • fentaNYL (SUBLIMAZE) injection 50 mcg  50 mcg Q HOUR PRN Pedro Vivas M.D.        Or   • fentaNYL (SUBLIMAZE) injection 100 mcg  100 mcg Q HOUR PRN Pedro Vivas M.D.       • fentaNYL (SUBLIMAZE) 50 mcg/mL in 50mL  0-200 mcg/hr Continuous Pedro Vivas M.D. 1 mL/hr at 06/23/17 0831 50 mcg/hr at 06/23/17 0831   • ipratropium-albuterol (DUONEB) nebulizer solution 3 mL  3 mL Q2HRS PRN (RT) Pedro Vivas M.D.       • ipratropium-albuterol (DUONEB) nebulizer solution 3 mL  3 mL Q4HRS (RT)  Pedro Vivas M.D.   3 mL at 06/24/17 0659   • famotidine (PEPCID) tablet 20 mg  20 mg Q12HRS Pedro Vivas M.D.   20 mg at 06/23/17 2112    Or   • famotidine (PEPCID) injection 20 mg  20 mg Q12HRS Pedro Vivas M.D.   20 mg at 06/23/17 0804   • propofol (DIPRIVAN) injection  5-80 mcg/kg/min Continuous Pedro Vivas M.D. 20.1 mL/hr at 06/24/17 0508 40 mcg/kg/min at 06/24/17 0508     Last reviewed on 6/23/2017  7:57 PM by Ángel Bazan R.N.    Quality  Measures:  Labs reviewed, Medications reviewed and Radiology images reviewed                      Assessment and Plan:  Ventilator-dependent respiratory failure.   - trial extubation today with good weaning parameters   - increasing agitation and resp distress after extubation   - re-intubated   - copious thick secretions; bronch with therapeutic airway lavage; sent for cx   - back on full vent support  Status post ground level fall with facial trauma   - 3 cm laceration to his lip, which was sutured in [place], [place]   - seen by trauma and cleared   Status post aspiration.   - empiric abx; f/u cxr  Acute alcohol intoxication with blood alcohol level of 0.206 in [place] at the time of presentation.   - sedation; monitor for w/d   - follow/correct lytes   - rally vits  Alcohol abuse.  Acute blood loss anemia.   - follow HH  Hyponatremia.  Incomplete historical database secondary to inability to interview the patient.  Presumed chronic lung disease based upon his medication list.  Presumed dyslipidemia.  Presumed diabetes mellitus   - glycemic control      Discussed patient condition and risk of morbidity and/or mortality with RN, RT, Pharmacy and Charge nurse / hot rounds.    The patient remains critically ill.  Critical care time = 37 minutes in directly providing and coordinating critical care and extensive data review.  No time overlap and excludes procedures.       Neva SOTOMAYOR (Scribe), am scribing for, and in  the presence ofGunnar M.D.    Electronically signed by: Neva Yeung (Scribe), 6/24/2017    IGunnar M.D. personally performed the services described in this documentation, as scribed by Neva Yeung in my presence, and it is both accurate and complete.

## 2017-06-24 NOTE — CARE PLAN
Problem: Pain Management  Goal: Pain level will decrease to patient’s comfort goal  Outcome: PROGRESSING AS EXPECTED  Patient cpot pain scores have been zero this shift with use of fentanyl gtt

## 2017-06-24 NOTE — CARE PLAN
Problem: Ventilation Defect:  Goal: Ability to achieve and maintain unassisted ventilation or tolerate decreased levels of ventilator support  Outcome: PROGRESSING SLOWER THAN EXPECTED  Adult Ventilation Update    Total Vent Days: 2      Patient Lines/Drains/Airways Status    Active Airway      Name: Placement date: Placement time: Site: Days:     Airway Group ET Tube Oral 7.5     Oral  2                 No SBT performed.     Plateau Pressure (Q Shift): 20 (06/23/17 1853)  Static Compliance (ml / cm H2O): 51.2 (06/23/17 2226)    Sputum/Suction  Cough: Productive (06/24/17 0000)  Sputum Amount: Small (06/24/17 0000)  Sputum Color: Tan (06/24/17 0000)  Sputum Consistency: Thin (06/24/17 0000)    Events/Summary/Plan: Titrated FIO2 to 30% (06/23/17 2226)

## 2017-06-24 NOTE — ASSESSMENT & PLAN NOTE
Extubated on 6/24 but required re-intubation and extubated again on 7/3.  BAL with pseudomonas and klebsiella  Completed 10 day course of zosyn  Continue nothing by mouth and cor tract

## 2017-06-24 NOTE — PROGRESS NOTES
Cortrak Placement    Tube Team verified patient name and medical record number prior to tube placement.  Cortrak  placed at 95 cm in left nare.  Per Cortrak picture, tube appears to be in the gastric region.  Nursing Instructions: Awaiting KUB to confirm placement before use for medications or feeding. Once placement confirmed, flush tube with 30 ml of water, and then remove and save stylet, in patient medication drawer.

## 2017-06-24 NOTE — ASSESSMENT & PLAN NOTE
s/p intubation x2  Pulmonary consulted and following  Currently on 2L of oxygen  Continue RT protocol

## 2017-06-25 ENCOUNTER — APPOINTMENT (OUTPATIENT)
Dept: RADIOLOGY | Facility: MEDICAL CENTER | Age: 77
DRG: 163 | End: 2017-06-25
Attending: INTERNAL MEDICINE
Payer: MEDICARE

## 2017-06-25 ENCOUNTER — APPOINTMENT (OUTPATIENT)
Dept: RADIOLOGY | Facility: MEDICAL CENTER | Age: 77
DRG: 163 | End: 2017-06-25
Attending: HOSPITALIST
Payer: MEDICARE

## 2017-06-25 ENCOUNTER — APPOINTMENT (OUTPATIENT)
Dept: RADIOLOGY | Facility: MEDICAL CENTER | Age: 77
DRG: 163 | End: 2017-06-25
Attending: SURGERY
Payer: MEDICARE

## 2017-06-25 LAB
ANION GAP SERPL CALC-SCNC: 8 MMOL/L (ref 0–11.9)
BACTERIA SPEC RESP CULT: ABNORMAL
BARCODED ABORH UBTYP: 600
BARCODED PRD CODE UBPRD: NORMAL
BARCODED UNIT NUM UBUNT: NORMAL
BASE EXCESS BLDA CALC-SCNC: -1 MMOL/L (ref -4–3)
BASOPHILS # BLD AUTO: 0.1 % (ref 0–1.8)
BASOPHILS # BLD AUTO: 0.1 % (ref 0–1.8)
BASOPHILS # BLD: 0.01 K/UL (ref 0–0.12)
BASOPHILS # BLD: 0.01 K/UL (ref 0–0.12)
BODY TEMPERATURE: NORMAL DEGREES
BUN SERPL-MCNC: 9 MG/DL (ref 8–22)
CALCIUM SERPL-MCNC: 6.4 MG/DL (ref 8.5–10.5)
CHLORIDE SERPL-SCNC: 118 MMOL/L (ref 96–112)
CO2 BLDA-SCNC: 24 MMOL/L (ref 20–33)
CO2 SERPL-SCNC: 17 MMOL/L (ref 20–33)
COMPONENT R 8504R: NORMAL
CREAT SERPL-MCNC: 0.43 MG/DL (ref 0.5–1.4)
EOSINOPHIL # BLD AUTO: 0.01 K/UL (ref 0–0.51)
EOSINOPHIL # BLD AUTO: 0.03 K/UL (ref 0–0.51)
EOSINOPHIL NFR BLD: 0.1 % (ref 0–6.9)
EOSINOPHIL NFR BLD: 0.4 % (ref 0–6.9)
ERYTHROCYTE [DISTWIDTH] IN BLOOD BY AUTOMATED COUNT: 51 FL (ref 35.9–50)
ERYTHROCYTE [DISTWIDTH] IN BLOOD BY AUTOMATED COUNT: 55 FL (ref 35.9–50)
GFR SERPL CREATININE-BSD FRML MDRD: >60 ML/MIN/1.73 M 2
GLUCOSE BLD-MCNC: 109 MG/DL (ref 65–99)
GLUCOSE BLD-MCNC: 130 MG/DL (ref 65–99)
GLUCOSE BLD-MCNC: 133 MG/DL (ref 65–99)
GLUCOSE BLD-MCNC: 93 MG/DL (ref 65–99)
GLUCOSE SERPL-MCNC: 97 MG/DL (ref 65–99)
GRAM STN SPEC: ABNORMAL
HCO3 BLDA-SCNC: 22.9 MMOL/L (ref 17–25)
HCT VFR BLD AUTO: 20.6 % (ref 42–52)
HCT VFR BLD AUTO: 29.3 % (ref 42–52)
HGB BLD-MCNC: 6.8 G/DL (ref 14–18)
HGB BLD-MCNC: 9.8 G/DL (ref 14–18)
IMM GRANULOCYTES # BLD AUTO: 0.02 K/UL (ref 0–0.11)
IMM GRANULOCYTES # BLD AUTO: 0.04 K/UL (ref 0–0.11)
IMM GRANULOCYTES NFR BLD AUTO: 0.3 % (ref 0–0.9)
IMM GRANULOCYTES NFR BLD AUTO: 0.6 % (ref 0–0.9)
LYMPHOCYTES # BLD AUTO: 0.23 K/UL (ref 1–4.8)
LYMPHOCYTES # BLD AUTO: 0.54 K/UL (ref 1–4.8)
LYMPHOCYTES NFR BLD: 3.2 % (ref 22–41)
LYMPHOCYTES NFR BLD: 7.7 % (ref 22–41)
MAGNESIUM SERPL-MCNC: 1.8 MG/DL (ref 1.5–2.5)
MCH RBC QN AUTO: 33 PG (ref 27–33)
MCH RBC QN AUTO: 33 PG (ref 27–33)
MCHC RBC AUTO-ENTMCNC: 33 G/DL (ref 33.7–35.3)
MCHC RBC AUTO-ENTMCNC: 33.4 G/DL (ref 33.7–35.3)
MCV RBC AUTO: 100 FL (ref 81.4–97.8)
MCV RBC AUTO: 98.7 FL (ref 81.4–97.8)
MONOCYTES # BLD AUTO: 0.2 K/UL (ref 0–0.85)
MONOCYTES # BLD AUTO: 0.42 K/UL (ref 0–0.85)
MONOCYTES NFR BLD AUTO: 2.8 % (ref 0–13.4)
MONOCYTES NFR BLD AUTO: 6 % (ref 0–13.4)
NEUTROPHILS # BLD AUTO: 6.02 K/UL (ref 1.82–7.42)
NEUTROPHILS # BLD AUTO: 6.69 K/UL (ref 1.82–7.42)
NEUTROPHILS NFR BLD: 85.5 % (ref 44–72)
NEUTROPHILS NFR BLD: 93.2 % (ref 44–72)
NRBC # BLD AUTO: 0 K/UL
NRBC # BLD AUTO: 0.05 K/UL
NRBC BLD AUTO-RTO: 0 /100 WBC
NRBC BLD AUTO-RTO: 0.7 /100 WBC
O2/TOTAL GAS SETTING VFR VENT: 30 %
PCO2 BLDA: 33.8 MMHG (ref 26–37)
PCO2 TEMP ADJ BLDA: 33.8 MMHG (ref 26–37)
PH BLDA: 7.44 [PH] (ref 7.4–7.5)
PH TEMP ADJ BLDA: 7.44 [PH] (ref 7.4–7.5)
PHOSPHATE SERPL-MCNC: 2.3 MG/DL (ref 2.5–4.5)
PLATELET # BLD AUTO: 130 K/UL (ref 164–446)
PLATELET # BLD AUTO: 95 K/UL (ref 164–446)
PMV BLD AUTO: 10.7 FL (ref 9–12.9)
PMV BLD AUTO: 10.8 FL (ref 9–12.9)
PO2 BLDA: 68 MMHG (ref 64–87)
PO2 TEMP ADJ BLDA: 68 MMHG (ref 64–87)
POTASSIUM SERPL-SCNC: 3.1 MMOL/L (ref 3.6–5.5)
PROCALCITONIN SERPL-MCNC: 0.15 NG/ML
PRODUCT TYPE UPROD: NORMAL
RBC # BLD AUTO: 2.06 M/UL (ref 4.7–6.1)
RBC # BLD AUTO: 2.97 M/UL (ref 4.7–6.1)
SAO2 % BLDA: 94 % (ref 93–99)
SIGNIFICANT IND 70042: ABNORMAL
SITE SITE: ABNORMAL
SODIUM SERPL-SCNC: 143 MMOL/L (ref 135–145)
SOURCE SOURCE: ABNORMAL
SPECIMEN DRAWN FROM PATIENT: NORMAL
TRIGL SERPL-MCNC: 122 MG/DL (ref 0–149)
UNIT STATUS USTAT: NORMAL
WBC # BLD AUTO: 7 K/UL (ref 4.8–10.8)
WBC # BLD AUTO: 7.2 K/UL (ref 4.8–10.8)

## 2017-06-25 PROCEDURE — 36556 INSERT NON-TUNNEL CV CATH: CPT

## 2017-06-25 PROCEDURE — 94150 VITAL CAPACITY TEST: CPT

## 2017-06-25 PROCEDURE — 36430 TRANSFUSION BLD/BLD COMPNT: CPT

## 2017-06-25 PROCEDURE — 700101 HCHG RX REV CODE 250: Performed by: INTERNAL MEDICINE

## 2017-06-25 PROCEDURE — 85025 COMPLETE CBC W/AUTO DIFF WBC: CPT

## 2017-06-25 PROCEDURE — 80048 BASIC METABOLIC PNL TOTAL CA: CPT

## 2017-06-25 PROCEDURE — 700105 HCHG RX REV CODE 258: Performed by: HOSPITALIST

## 2017-06-25 PROCEDURE — 700117 HCHG RX CONTRAST REV CODE 255: Performed by: HOSPITALIST

## 2017-06-25 PROCEDURE — 700101 HCHG RX REV CODE 250: Performed by: PHARMACIST

## 2017-06-25 PROCEDURE — 76937 US GUIDE VASCULAR ACCESS: CPT

## 2017-06-25 PROCEDURE — 74177 CT ABD & PELVIS W/CONTRAST: CPT

## 2017-06-25 PROCEDURE — 84100 ASSAY OF PHOSPHORUS: CPT

## 2017-06-25 PROCEDURE — 700102 HCHG RX REV CODE 250 W/ 637 OVERRIDE(OP): Performed by: HOSPITALIST

## 2017-06-25 PROCEDURE — 71010 DX-CHEST-PORTABLE (1 VIEW): CPT

## 2017-06-25 PROCEDURE — 84145 PROCALCITONIN (PCT): CPT

## 2017-06-25 PROCEDURE — 770022 HCHG ROOM/CARE - ICU (200)

## 2017-06-25 PROCEDURE — 86923 COMPATIBILITY TEST ELECTRIC: CPT

## 2017-06-25 PROCEDURE — 99291 CRITICAL CARE FIRST HOUR: CPT | Performed by: INTERNAL MEDICINE

## 2017-06-25 PROCEDURE — 02HV33Z INSERTION OF INFUSION DEVICE INTO SUPERIOR VENA CAVA, PERCUTANEOUS APPROACH: ICD-10-PCS | Performed by: HOSPITALIST

## 2017-06-25 PROCEDURE — 30233N1 TRANSFUSION OF NONAUTOLOGOUS RED BLOOD CELLS INTO PERIPHERAL VEIN, PERCUTANEOUS APPROACH: ICD-10-PCS | Performed by: HOSPITALIST

## 2017-06-25 PROCEDURE — 36556 INSERT NON-TUNNEL CV CATH: CPT | Performed by: HOSPITALIST

## 2017-06-25 PROCEDURE — C1751 CATH, INF, PER/CENT/MIDLINE: HCPCS

## 2017-06-25 PROCEDURE — B548ZZA ULTRASONOGRAPHY OF SUPERIOR VENA CAVA, GUIDANCE: ICD-10-PCS | Performed by: HOSPITALIST

## 2017-06-25 PROCEDURE — 700105 HCHG RX REV CODE 258: Performed by: PHARMACIST

## 2017-06-25 PROCEDURE — 94640 AIRWAY INHALATION TREATMENT: CPT

## 2017-06-25 PROCEDURE — 84478 ASSAY OF TRIGLYCERIDES: CPT

## 2017-06-25 PROCEDURE — 700111 HCHG RX REV CODE 636 W/ 250 OVERRIDE (IP): Performed by: HOSPITALIST

## 2017-06-25 PROCEDURE — 700105 HCHG RX REV CODE 258

## 2017-06-25 PROCEDURE — 700111 HCHG RX REV CODE 636 W/ 250 OVERRIDE (IP): Performed by: INTERNAL MEDICINE

## 2017-06-25 PROCEDURE — A9270 NON-COVERED ITEM OR SERVICE: HCPCS | Performed by: HOSPITALIST

## 2017-06-25 PROCEDURE — 31500 INSERT EMERGENCY AIRWAY: CPT

## 2017-06-25 PROCEDURE — 700102 HCHG RX REV CODE 250 W/ 637 OVERRIDE(OP): Performed by: INTERNAL MEDICINE

## 2017-06-25 PROCEDURE — 36600 WITHDRAWAL OF ARTERIAL BLOOD: CPT

## 2017-06-25 PROCEDURE — 94003 VENT MGMT INPAT SUBQ DAY: CPT

## 2017-06-25 PROCEDURE — P9016 RBC LEUKOCYTES REDUCED: HCPCS

## 2017-06-25 PROCEDURE — 99233 SBSQ HOSP IP/OBS HIGH 50: CPT | Mod: 25 | Performed by: HOSPITALIST

## 2017-06-25 PROCEDURE — 83735 ASSAY OF MAGNESIUM: CPT

## 2017-06-25 PROCEDURE — 82962 GLUCOSE BLOOD TEST: CPT

## 2017-06-25 PROCEDURE — 82803 BLOOD GASES ANY COMBINATION: CPT

## 2017-06-25 PROCEDURE — A9270 NON-COVERED ITEM OR SERVICE: HCPCS | Performed by: INTERNAL MEDICINE

## 2017-06-25 RX ORDER — POTASSIUM CHLORIDE 29.8 MG/ML
40 INJECTION INTRAVENOUS ONCE
Status: DISCONTINUED | OUTPATIENT
Start: 2017-06-25 | End: 2017-06-25

## 2017-06-25 RX ORDER — POTASSIUM CHLORIDE 7.45 MG/ML
10 INJECTION INTRAVENOUS
Status: COMPLETED | OUTPATIENT
Start: 2017-06-25 | End: 2017-06-25

## 2017-06-25 RX ORDER — SODIUM CHLORIDE 9 MG/ML
INJECTION, SOLUTION INTRAVENOUS
Status: COMPLETED
Start: 2017-06-25 | End: 2017-06-25

## 2017-06-25 RX ORDER — MAGNESIUM SULFATE HEPTAHYDRATE 40 MG/ML
4 INJECTION, SOLUTION INTRAVENOUS ONCE
Status: COMPLETED | OUTPATIENT
Start: 2017-06-25 | End: 2017-06-25

## 2017-06-25 RX ADMIN — PIPERACILLIN SODIUM AND TAZOBACTAM SODIUM 3.38 G: 3; .375 INJECTION, POWDER, FOR SOLUTION INTRAVENOUS at 21:14

## 2017-06-25 RX ADMIN — SODIUM CHLORIDE: 9 INJECTION, SOLUTION INTRAVENOUS at 14:11

## 2017-06-25 RX ADMIN — AMPICILLIN SODIUM AND SULBACTAM SODIUM 3 G: 2; 1 INJECTION, POWDER, FOR SOLUTION INTRAMUSCULAR; INTRAVENOUS at 04:59

## 2017-06-25 RX ADMIN — KETOROLAC TROMETHAMINE 1 DROP: 4 SOLUTION/ DROPS OPHTHALMIC at 09:00

## 2017-06-25 RX ADMIN — FAMOTIDINE 20 MG: 20 TABLET, FILM COATED ORAL at 08:13

## 2017-06-25 RX ADMIN — IPRATROPIUM BROMIDE AND ALBUTEROL SULFATE 3 ML: .5; 3 SOLUTION RESPIRATORY (INHALATION) at 06:37

## 2017-06-25 RX ADMIN — POTASSIUM CHLORIDE 10 MEQ: 7.46 INJECTION, SOLUTION INTRAVENOUS at 06:24

## 2017-06-25 RX ADMIN — PROPOFOL 30 MCG/KG/MIN: 10 INJECTION, EMULSION INTRAVENOUS at 05:16

## 2017-06-25 RX ADMIN — POTASSIUM CHLORIDE 10 MEQ: 7.46 INJECTION, SOLUTION INTRAVENOUS at 07:41

## 2017-06-25 RX ADMIN — ACETAMINOPHEN 650 MG: 325 TABLET, FILM COATED ORAL at 08:12

## 2017-06-25 RX ADMIN — KETOROLAC TROMETHAMINE 1 DROP: 4 SOLUTION/ DROPS OPHTHALMIC at 21:06

## 2017-06-25 RX ADMIN — IOHEXOL 100 ML: 350 INJECTION, SOLUTION INTRAVENOUS at 15:12

## 2017-06-25 RX ADMIN — POTASSIUM CHLORIDE 10 MEQ: 7.46 INJECTION, SOLUTION INTRAVENOUS at 10:30

## 2017-06-25 RX ADMIN — IPRATROPIUM BROMIDE AND ALBUTEROL SULFATE 3 ML: .5; 3 SOLUTION RESPIRATORY (INHALATION) at 14:08

## 2017-06-25 RX ADMIN — PROPOFOL 45 MCG/KG/MIN: 10 INJECTION, EMULSION INTRAVENOUS at 10:34

## 2017-06-25 RX ADMIN — SODIUM CHLORIDE: 9 INJECTION, SOLUTION INTRAVENOUS at 06:15

## 2017-06-25 RX ADMIN — SENNOSIDES AND DOCUSATE SODIUM 2 TABLET: 8.6; 5 TABLET ORAL at 08:12

## 2017-06-25 RX ADMIN — PROPOFOL 25 MCG/KG/MIN: 10 INJECTION, EMULSION INTRAVENOUS at 20:54

## 2017-06-25 RX ADMIN — AMPICILLIN SODIUM AND SULBACTAM SODIUM 3 G: 2; 1 INJECTION, POWDER, FOR SOLUTION INTRAMUSCULAR; INTRAVENOUS at 12:26

## 2017-06-25 RX ADMIN — AMPICILLIN SODIUM AND SULBACTAM SODIUM 3 G: 2; 1 INJECTION, POWDER, FOR SOLUTION INTRAMUSCULAR; INTRAVENOUS at 17:19

## 2017-06-25 RX ADMIN — IPRATROPIUM BROMIDE AND ALBUTEROL SULFATE 3 ML: .5; 3 SOLUTION RESPIRATORY (INHALATION) at 18:22

## 2017-06-25 RX ADMIN — IPRATROPIUM BROMIDE AND ALBUTEROL SULFATE 3 ML: .5; 3 SOLUTION RESPIRATORY (INHALATION) at 02:55

## 2017-06-25 RX ADMIN — CHLORHEXIDINE GLUCONATE 15 ML: 1.2 RINSE ORAL at 20:16

## 2017-06-25 RX ADMIN — CHLORHEXIDINE GLUCONATE 15 ML: 1.2 RINSE ORAL at 08:12

## 2017-06-25 RX ADMIN — MAGNESIUM SULFATE IN WATER 4 G: 40 INJECTION, SOLUTION INTRAVENOUS at 08:05

## 2017-06-25 RX ADMIN — IPRATROPIUM BROMIDE AND ALBUTEROL SULFATE 3 ML: .5; 3 SOLUTION RESPIRATORY (INHALATION) at 11:08

## 2017-06-25 RX ADMIN — ENOXAPARIN SODIUM 40 MG: 100 INJECTION SUBCUTANEOUS at 10:16

## 2017-06-25 RX ADMIN — SENNOSIDES AND DOCUSATE SODIUM 2 TABLET: 8.6; 5 TABLET ORAL at 20:16

## 2017-06-25 RX ADMIN — SODIUM PHOSPHATE, MONOBASIC, MONOHYDRATE AND SODIUM PHOSPHATE, DIBASIC, ANHYDROUS 15 MMOL: 276; 142 INJECTION, SOLUTION INTRAVENOUS at 09:21

## 2017-06-25 RX ADMIN — POTASSIUM CHLORIDE 10 MEQ: 7.46 INJECTION, SOLUTION INTRAVENOUS at 09:21

## 2017-06-25 RX ADMIN — IPRATROPIUM BROMIDE AND ALBUTEROL SULFATE 3 ML: .5; 3 SOLUTION RESPIRATORY (INHALATION) at 22:32

## 2017-06-25 RX ADMIN — CALCIUM GLUCONATE 1 G: 94 INJECTION, SOLUTION INTRAVENOUS at 07:26

## 2017-06-25 RX ADMIN — THERA TABS 1 TABLET: TAB at 08:13

## 2017-06-25 RX ADMIN — FOLIC ACID 1 MG: 1 TABLET ORAL at 08:13

## 2017-06-25 RX ADMIN — PROPOFOL 45 MCG/KG/MIN: 10 INJECTION, EMULSION INTRAVENOUS at 14:10

## 2017-06-25 RX ADMIN — ACETAMINOPHEN 650 MG: 325 TABLET, FILM COATED ORAL at 20:16

## 2017-06-25 RX ADMIN — Medication 100 MG: at 08:12

## 2017-06-25 RX ADMIN — FAMOTIDINE 20 MG: 20 TABLET, FILM COATED ORAL at 20:16

## 2017-06-25 ASSESSMENT — PULMONARY FUNCTION TESTS
FVC: .8
FVC: 1.9

## 2017-06-25 NOTE — CARE PLAN
Problem: Ventilation Defect:  Goal: Ability to achieve and maintain unassisted ventilation or tolerate decreased levels of ventilator support  Intervention: Support and monitor invasive and noninvasive mechanical ventilation  Adult Ventilation Update    Total Vent Days: 3      Patient Lines/Drains/Airways Status    Active Airway      Name: Placement date: Placement time: Site: Days:     Airway Group ET Tube 8.0 06/24/17 1845    1                 Plateau Pressure (Q Shift): 21 (06/25/17 0255)  Static Compliance (ml / cm H2O): 41 (06/25/17 0255)    Sputum/Suction   Cough: Productive (06/25/17 0400)  Sputum Amount: Small (06/25/17 0400)  Sputum Color: Tan (06/25/17 0400)  Sputum Consistency: Thin (06/25/17 0400)    Events/Summary/Plan: Patient re-intubated at the beginning of the shift by Dr. Bryan. Fio2 titrated to 30%. Will continue to monitor.

## 2017-06-25 NOTE — RESPIRATORY CARE
Ventilator Weaning Update    Patient is on vent day 3.  SBT was tolerated for a minimum of 1 hours on settings of 5 over 8.    Wean parameters for this SBT were:  #FVC / Vital Capacity (liters) : 0.8 (06/25/17 0749)  NIF (cm H2O) : -13 (06/25/17 0749)  Rapid Shallow Breathing Index (RR/VT): 10 (06/25/17 0749)  RR (bpm): 15 (06/25/17 0749)  Spontaneous VE: 10.4 (06/25/17 0749)  Spontaneous VT: 483 (06/25/17 0749)    Events/Summary/Plan: parameters obtained and placed pt back on rest settings (06/25/17 0749)

## 2017-06-25 NOTE — ASSESSMENT & PLAN NOTE
Macrocytic  Folate, B12 and TSH WNL  No sign of acute bleed.  Hemoglobin has been in the 8-10 range  Continue PPi

## 2017-06-25 NOTE — PROGRESS NOTES
1800 Patient starting to have more labored breathing MD Bryan notified will set up to intubate and bronch, Two respiratory therapists at bedside.

## 2017-06-25 NOTE — PROCEDURES
DATE OF SERVICE:  06/24/2017    NAME OF THE PROCEDURE:  Diagnostic and therapeutic bronchoscopy.    INDICATION:  Acute hypoxemic respiratory failure, query pneumonia.    HOSPITAL COURSE:  Patient was extubated today and has developed increasing   respiratory distress.  He was reintubated.  There was copious thick secretions   in the posterior oropharynx and bronchoscopy is indicated as above.    A flexible fiberoptic bronchoscope was inserted through the endotracheal tube   without difficulty.  All airways were examined at subsegmental level.  There   was a copious amount of very thick tan yellow secretions with some mild blood   streaking in the central airways.  Additionally, the lower lobes were then   sequentially lavaged with small aliquots of saline, yielding a similar thick   tenacious purulent bronchial plugs and thick secretions, which were   therapeutically lavaged clear.  Bronchial washings were sent for appropriate   culture.  There were no endobronchial lesions.    IMPRESSION:  1.  Inflamed mucosa and very thick purulent secretions throughout the airways.  2.  Therapeutic lavage of both lower lobes.  3.  Bronchial washings sent for culture.    No immediate complications.       ____________________________________     MD BRIDGETTE COHEN / GABRIELLE    DD:  06/24/2017 19:00:04  DT:  06/25/2017 07:05:49    D#:  [id number]  Job#:  [id number]

## 2017-06-25 NOTE — DIETARY
NUTRITION - Order received for Metabolic Cart Study per Dietitian/RT. Study not indicated at this time. Dietitian/RT to order as needed.

## 2017-06-25 NOTE — PROCEDURES
Date: 6/24/2017    Procedure:  Emergent orotracheal intubation    Indication: Respiratory failure    Physician:  Dr. Gunnar Bryan MD    Consent:  Emergent     Procedure:  This patient has developed increasing respiratory failure after extubation today and requires emergent intubation.  RSI given with propofol and rocuronium.  Using a #4 glidescope, an 8.0 ETT was placed on the first attempt w/o complication.  Tube placement confirmed by direct visualization of placement, end-tidal CO2 monitor color change and equal breath sounds.  No immediate complications.  Vitals remained stable throughout the procedure.  A post-procedure CXR will be reviewed.

## 2017-06-25 NOTE — PROGRESS NOTES
[facility] Hospitalist Progress Note    Date of Service: 2017    Chief Complaint  77 y.o. male admitted 2017 with alcohol abuse admitted after fall and intubated in ED.    Interval Problem Update  Reintubated early evening yesterday.  Fever this am.  Withdraws to pain.  NSR.  Good BP.  On tube feeds.  Care discussed in am ICU MDR.    Consultants/Specialty  Pulmonary    Disposition  Watch for withdrawals        Review of Systems   Unable to perform ROS: acuity of condition      Physical Exam  Laboratory/Imaging   Hemodynamics  Temp (24hrs), Av.5 °C (99.5 °F), Min:37 °C (98.6 °F), Max:38.8 °C (101.9 °F)   Temperature: 37.9 °C (100.2 °F), Monitored Temp: 39.1 °C (102.4 °F)  Pulse  Av.4  Min: 65  Max: 127 Heart Rate (Monitored): 87  Blood Pressure : 120/49 mmHg, NIBP: 119/53 mmHg      Respiratory  Carbajal Vent Mode: APVCMV, Rate (breaths/min): 20, PEEP/CPAP: 8, PEEP/CPAP: 8, FiO2: 30, P Peak (PIP): 20, P MEAN: 12   Respiration: (!) 24, Pulse Oximetry: 98 %     Work Of Breathing / Effort: Vented  RUL Breath Sounds: Diminished, RML Breath Sounds: Diminished, RLL Breath Sounds: Diminished, LION Breath Sounds: Diminished, LLL Breath Sounds: Diminished    Fluids    Intake/Output Summary (Last 24 hours) at 17 1736  Last data filed at 17 1600   Gross per 24 hour   Intake 4337.41 ml   Output   1400 ml   Net 2937.41 ml       Nutrition  Orders Placed This Encounter   Procedures   • Diet NPO     Standing Status: Standing      Number of Occurrences: 1      Standing Expiration Date:      Order Specific Question:  Type:     Answer:  Now [1]     Order Specific Question:  Restrict to:     Answer:  Strict [1]     Physical Exam   Constitutional: No distress. He is sedated, intubated and restrained.   obese   HENT:   Head: Normocephalic and atraumatic.   Nose: Nose normal.   Eyes: Conjunctivae and EOM are normal. Right eye exhibits no discharge. Left eye exhibits no discharge. No scleral icterus.   Neck: No  tracheal deviation present.   Cardiovascular: Normal rate, regular rhythm, normal heart sounds and intact distal pulses.    No murmur heard.  Pulmonary/Chest: Effort normal and breath sounds normal. No stridor. He is intubated. No respiratory distress. He has no wheezes.   Abdominal: Soft. Bowel sounds are normal. He exhibits no distension. There is no tenderness.   Musculoskeletal: He exhibits no edema.   Neurological: He is alert. No cranial nerve deficit.   Skin: Skin is warm. He is not diaphoretic.   Psychiatric: He is agitated. Cognition and memory are impaired. He expresses impulsivity and inappropriate judgment. He is noncommunicative.   Vitals reviewed.      Recent Labs      06/24/17   0330  06/25/17   0512  06/25/17   1207   WBC  9.5  7.0  7.2   RBC  2.37*  2.06*  2.97*   HEMOGLOBIN  7.9*  6.8*  9.8*   HEMATOCRIT  23.3*  20.6*  29.3*   MCV  98.7*  100.0*  98.7*   MCH  32.9  33.0  33.0   MCHC  33.3*  33.0*  33.4*   RDW  50.4*  51.0*  55.0*   PLATELETCT  113*  95*  130*   MPV  10.4  10.8  10.7     Recent Labs      06/23/17   0421  06/24/17   0330  06/25/17   0512   SODIUM  133*  139  143   POTASSIUM  3.8  3.3*  3.1*   CHLORIDE  104  113*  118*   CO2  16*  21  17*   GLUCOSE  134*  132*  97   BUN  13  10  9   CREATININE  0.58  0.50  0.43*   CALCIUM  8.1*  7.1*  6.4*     Recent Labs      06/23/17   0421   APTT  26.3   INR  1.08         Recent Labs      06/25/17   0512   TRIGLYCERIDE  122          Assessment/Plan     Aspiration pneumonitis (CMS-HCC)  Assessment & Plan  Extubated 6/24 afternoon but required reintubation  abx  Monitor vitals, I/Os    Respiratory failure (CMS-HCC)  Assessment & Plan  Pulmonary consulting.  Monitor ABG, CXR, vitals, I/O's  Wean once not sign of EtOH w/d.  6/24 resp culture w/ and Klebsiella & Pseudomonas light growth  Changed Unasyn to Zosyn 6/25    Alcohol abuse (present on admission)  Assessment & Plan  Watch for w/d.  Phenobarbital protocol  Folate, thiamine.    Anemia  Assessment  & Plan  Drop in Hgb. No sign of acute bleed.  Transfused 1 unit pRBC's 6/25  Monitor cbc  Check stool guaiac.    Hypokalemia  Assessment & Plan  Replace and recheck bmp      Radiology images reviewed, Labs reviewed and Medications reviewed  Herring catheter: Critically Ill - Requiring Accurate Measurement of Urinary Output and Unconscious / Sedated Patient on a Ventilator

## 2017-06-25 NOTE — PROGRESS NOTES
Pulmonary Critical Care Progress Note        Date of Service: 6/25/2017      Chief Complaint: GLF and ETOH    History of Present Illness:   This is a 77-year-old male admitted to the ICU through the ED on 6/23/17 for acute alcohol intoxication and ground level fall. Patient intubated in the ED.       ROS:  Respiratory: unable to perform due to the patient's inability to effectively communicate, Cardiac: unable to perform due to the patient's inability to effectively communicate, GI: unable to perform due to the patient's inability to effectively communicate.  All other systems negative.      Interval Events:  24 hour interval history reviewed   Re-intubated at 1800 yesterday  Blood transfusion at 6:30 AM. Last hemoglobin 6.8  Benign febrile hemolytic reaction this AM with Tmax 39. Tylenol X1. No rash   Withdraws from pain but not following commands, agitated. Prop 40   SR BP  systolic.    TF restarted this AM. 750 cc UOP   SBT for an hour at 5/8 with RSBI 10, NiF -15 and VC 0.8  BAL with NGTD, on Unasyn.         PFSH:  No change.      Physical Exam:  General:   Sedate on Prop.   HEENT:  PERRL, Scleral edema. Facial laceration noted unchanged, right nare cortrak TF tolerated, ETT in place, neck supple, trachea midline,   CVS:  Distant, tachycardic, regular  Respiratory:  Scattered coarse breath sounds, moderately diminished, no wheezing  Abdomen:  Soft, NT, bowel sounds present  Extremities:  trace edema, 2+ distal pulses  Skin:  Warm, dry  Neuro/Psych:  Sedate on Prop, not following, withdraws bilaterally.      Respiratory:  Carbajal Vent Mode: Spont, Rate (breaths/min): 20, Vt Target (mL): 460, PEEP/CPAP: 8, FiO2: 30, P Support: 5, Static Compliance (ml / cm H2O): 50, Control VTE (exp VT): 450  Pulse Oximetry: 94 % (Simultaneous filing. User may not have seen previous data.)  ImagingAvailable data reviewed       Recent Labs      06/24/17 0424  06/24/17 1952 06/25/17   0432   ISTATAPH  7.342*   7.398*  7.439   ISTATAPCO2  38.7*  39.6*  33.8   ISTATAPO2  72  104*  68   ISTATATCO2  22  26  24   [id number]  93  98  94   ISTATARTHCO3  21.0  24.5  22.9   ISTATARTBE  -4  0  -1   ISTATTEMP  37.8 C  39.0 C  37.0 C   ISTATFIO2  30  70  30   ISTATSPEC  Arterial  Arterial  Arterial   ISTATAPHTC  7.330*  7.369*  7.439   [id number]  76  117*  68       HemoDynamics:  Pulse: 79, Heart Rate (Monitored): 79  Blood Pressure : 116/57 mmHg, NIBP: (!) 94/44 mmHg     Imaging: Available data reviewed      Neuro:  GCS Total [place] Coma Score: 6  Imaging: Available data reviewed      Fluids:  Intake/Output       06/23/17 0700 - 06/24/17 0659 06/24/17 0700 - 06/25/17 0659 06/25/17 0700 - 06/26/17 0659      0700-1859 1989-0209 Total 0456-9350 1207-0272 Total 3534-3592 1720-3305 Total       Intake    I.V.  4475.6  1449.2 5924.8  2014.4  1596.3 3610.6  --  -- --    Propofol Volume 475.6 249.2 724.8 114.4 196.3 310.6 -- -- --    IV Volume (Normal saline) 4000 1200 5200 1200 1200 2400 -- -- --    IV Volume (IVPB) -- -- -- 700 200 900 -- -- --    Other  60  60 120  60  -- 60  --  -- --    Medications (P.O./ Enteral Liquids) 60 60 120 60 -- 60 -- -- --    Enteral  120  335 455  220  45 265  --  -- --    Enteral Volume -- 275 275 160 15 175 -- -- --    Free Water / Tube Flush 120 60 180 60 30 90 -- -- --    Total Intake 4655.6 1844.2 6499.8 2294.4 1641.3 3935.6 -- -- --       Output    Urine  875  940 1815  1325  750 2075  --  -- --    Indwelling Cathether  5044 958 8440 -- -- --    Total Output  1721 053 1045 -- -- --       Net I/O     3780.6 904.2 4684.8 969.4 891.3 1860.6 -- -- --        Weight: 89.2 kg (196 lb 10.4 oz)  Recent Labs      06/23/17   0421  06/23/17   1030  06/24/17   0330  06/25/17   0512   SODIUM  133*   --   139  143   POTASSIUM  3.8   --   3.3*  3.1*   CHLORIDE  104   --   113*  118*   CO2  16*   --   21  17*   BUN  13   --   10  9   CREATININE  0.58   --   0.50  0.43*   MAGNESIUM   --   1.5   2.0  1.8   PHOSPHORUS   --   2.6  1.7*  2.3*   CALCIUM  8.1*   --   7.1*  6.4*       GI/Nutrition:  Imaging: Available data reviewed  NPO       Liver Function  Recent Labs      17   ALTSGPT  18   --    --    ASTSGOT  37   --    --    ALKPHOSPHAT  69   --    --    TBILIRUBIN  0.5   --    --    GLUCOSE  134*  132*  97       Heme:  Recent Labs      17   RBC  3.32*  2.37*  2.06*   HEMOGLOBIN  11.1*  7.9*  6.8*   HEMATOCRIT  33.2*  23.3*  20.6*   PLATELETCT  162*  113*  95*   PROTHROMBTM  14.3   --    --    APTT  26.3   --    --    INR  1.08   --    --        Infectious Disease:  Temp  Av.2 °C (98.9 °F)  Min: 37 °C (98.6 °F)  Max: 37.4 °C (99.3 °F)  Monitored Temp  Av.1 °C (100.5 °F)  Min: 37.4 °C (99.3 °F)  Max: 39.1 °C (102.4 °F)  Micro: reviewed     Recent Labs      17   WBC  20.8*  9.5  7.0   NEUTSPOLYS   --   86.20*  85.50*   LYMPHOCYTES   --   7.20*  7.70*   MONOCYTES   --   6.00  6.00   EOSINOPHILS   --   0.20  0.40   BASOPHILS   --   0.10  0.10   ASTSGOT  37   --    --    ALTSGPT  18   --    --    ALKPHOSPHAT  69   --    --    TBILIRUBIN  0.5   --    --      Current Facility-Administered Medications   Medication Dose Frequency Provider Last Rate Last Dose   • magnesium sulfate IVPB premix 4 g  4 g Once Pedro Seay D.O.       • calcium GLUConate 1 g in D5W 100 mL IVPB  1 g Once Tunde Panchal M.D.       • potassium chloride in water (KCL) ivpb 10 mEq  10 mEq Q HOUR Pedro Seay D.O.   10 mEq at 06/25/17 0624   • oxycodone immediate-release (ROXICODONE) tablet 5 mg  5 mg Q4HRS PRN Gunnar Bryan M.D.       • oxycodone immediate release (ROXICODONE) tablet 10 mg  10 mg Q4HRS PRN Gunnar Bryan M.D.       • lorazepam (ATIVAN) injection 1-2 mg  1-2 mg Q4HRS PRN Gunnar Bryan M.D.   1 mg at 17 1248   • MD ALERT...Phenobarbital Alcohol Withdrawal Protocol  Pharmacist to Implement (ICU Only)   PRN Gunnar Bryan M.D.       • Pharmacy Consult Request 1 Each  1 Each PRN Francisco Balderas PHARMD        And   • PHENObarbital injection 130 mg  130 mg Q30 MIN PRN Francisco Balderas PHARMD        And   • PHENObarbital injection 260 mg  260 mg Q30 MIN PRN Francisco Balderas PHARMD   260 mg at 06/24/17 1420   • KETOROLAC TROMETHAMINE (OPHTH) 0.4 % SOLN 1 Drop  1 Drop TWICE DAILY Gunnar Bryan M.D.   1 Drop at 06/24/17 1530   • Respiratory Care per Protocol   Continuous RT Gunnar Bryan M.D.       • MD ALERT...Adult ICU Electrolyte Replacement per Pharmacy Protocol   pharmacy to dose Gunnar Bryan M.D.       • fentaNYL (SUBLIMAZE) injection 25 mcg  25 mcg Q HOUR PRN Gunnar Bryan M.D.        Or   • fentaNYL (SUBLIMAZE) injection 50 mcg  50 mcg Q HOUR PRN Gunnar Bryan M.D.        Or   • fentaNYL (SUBLIMAZE) injection 100 mcg  100 mcg Q HOUR PRN Gunnar Bryan M.D.       • ipratropium-albuterol (DUONEB) nebulizer solution 3 mL  3 mL Q2HRS PRN (RT) Gunnar Bryan M.D.       • ipratropium-albuterol (DUONEB) nebulizer solution 3 mL  3 mL Q4HRS (RT) Gunnar Bryan M.D.   3 mL at 06/25/17 0637   • acetaminophen (TYLENOL) tablet 650 mg  650 mg Q4HRS PRN Pedro Vivsa M.D.       • acetaminophen (TYLENOL) suppository 650 mg  650 mg Q6HRS PRN Pedro Vivas M.D.   650 mg at 06/24/17 1955   • insulin lispro (HUMALOG) injection 2-9 Units  2-9 Units Q6HRS Tunde Panchal M.D.   Stopped at 06/24/17 1800   • NS infusion   Continuous Tunde Panchal M.D. 100 mL/hr at 06/24/17 1749     • ampicillin/sulbactam (UNASYN) 3 g in  mL IVPB  3 g Q6HRS Pedro Seay D.O. 200 mL/hr at 06/25/17 0459 3 g at 06/25/17 0459   • glucose 4 g chewable tablet 16 g  16 g Q15 MIN PRN Kaya Gonzalez, PHARMD       • dextrose 50% (D50W) injection 25 mL  25 mL Q15 MIN PRN Kaya Gonzalez, PHARMD       • thiamine (THIAMINE) tablet 100 mg  100 mg DAILY Tunde Panchal M.D.   100 mg at  06/24/17 0845   • folic acid (FOLVITE) tablet 1 mg  1 mg DAILY Tunde Panchal M.D.   1 mg at 06/24/17 0846   • multivitamin (THERAGRAN) tablet 1 Tab  1 Tab DAILY Pedro Vivas M.D.   1 Tab at 06/24/17 0845   • senna-docusate (PERICOLACE or SENOKOT S) 8.6-50 MG per tablet 2 Tab  2 Tab BID Pedro Vivas M.D.   Stopped at 06/24/17 2100    And   • polyethylene glycol/lytes (MIRALAX) PACKET 1 Packet  1 Packet QDAY PRN Pedro Vivas M.D.        And   • magnesium hydroxide (MILK OF MAGNESIA) suspension 30 mL  30 mL QDAY PRN Pedro Vivas M.D.        And   • bisacodyl (DULCOLAX) suppository 10 mg  10 mg QDAY PRN Pedro Vivas M.D.       • chlorhexidine (PERIDEX) 0.12 % solution 15 mL  15 mL BID Pedro Vivas M.D.   15 mL at 06/24/17 2004   • lidocaine (XYLOCAINE) 1%  injection  1-2 mL Q30 MIN PRN Pedro Vivas M.D.       • enoxaparin (LOVENOX) inj 40 mg  40 mg DAILY Pedro Vivas M.D.   40 mg at 06/24/17 0845   • famotidine (PEPCID) tablet 20 mg  20 mg Q12HRS Pedro Vivas M.D.   20 mg at 06/24/17 0846    Or   • famotidine (PEPCID) injection 20 mg  20 mg Q12HRS Pedro Vivas M.D.   20 mg at 06/24/17 2004   • propofol (DIPRIVAN) injection  5-80 mcg/kg/min Continuous Pedro Vivas M.D. 15 mL/hr at 06/25/17 0516 30 mcg/kg/min at 06/25/17 0516     Last reviewed on 6/23/2017  7:57 PM by Ángel Bazan R.N.    Quality  Measures:  Labs reviewed, Medications reviewed and Radiology images reviewed                      Assessment and Plan:  Ventilator-dependent respiratory failure.   - trial extubation 6/24   - increasing agitation and resp distress and re-intubated in several hours 6/24   - copious thick secretions; bronch with therapeutic airway lavage; sent for cx   - back on full vent support  Status post ground level fall with facial trauma   - 3 cm laceration to his lip, which was sutured in [place], [place]   - seen by  trauma and cleared    - anemia; drop in hgb again; Trauma following; CT abd/pelvis neg today; follow  Status post aspiration.   - empiric abx; f/u cxr  Acute alcohol intoxication with blood alcohol level of 0.206 in [place] at the time of presentation.   - sedation; monitor for w/d   - follow/correct lytes   - rally vits  Alcohol abuse.  Acute blood loss anemia.   - follow HH  Hyponatremia.  Incomplete historical database secondary to inability to interview the patient.  Presumed chronic lung disease based upon his medication list.  Presumed dyslipidemia.  Presumed diabetes mellitus   - glycemic control      Discussed patient condition and risk of morbidity and/or mortality with RN, RT, Pharmacy and Charge nurse / hot rounds.    The patient remains critically ill.  Critical care time = 32 minutes in directly providing and coordinating critical care and extensive data review.  No time overlap and excludes procedures.      Carmen SOTOMAYOR (Alison), am scribing for, and in the presence of, Gunnar Bryan M.D.  Electronically signed by: Carmen Maciel (Alison), 6/25/2017  Gunnar SOTOMAYOR M.D.personally performed the services described in this documentation, as scribed by Carmen Maciel in my presence, and it is both accurate and complete.

## 2017-06-26 ENCOUNTER — APPOINTMENT (OUTPATIENT)
Dept: RADIOLOGY | Facility: MEDICAL CENTER | Age: 77
DRG: 163 | End: 2017-06-26
Attending: INTERNAL MEDICINE
Payer: MEDICARE

## 2017-06-26 LAB
ALBUMIN SERPL BCP-MCNC: 2.6 G/DL (ref 3.2–4.9)
ALBUMIN/GLOB SERPL: 1.2 G/DL
ALP SERPL-CCNC: 142 U/L (ref 30–99)
ALT SERPL-CCNC: 18 U/L (ref 2–50)
ANION GAP SERPL CALC-SCNC: 5 MMOL/L (ref 0–11.9)
AST SERPL-CCNC: 24 U/L (ref 12–45)
BACTERIA SPEC RESP CULT: ABNORMAL
BASE EXCESS BLDA CALC-SCNC: -2 MMOL/L (ref -4–3)
BASOPHILS # BLD AUTO: 0 % (ref 0–1.8)
BASOPHILS # BLD: 0 K/UL (ref 0–0.12)
BILIRUB SERPL-MCNC: 0.8 MG/DL (ref 0.1–1.5)
BODY TEMPERATURE: ABNORMAL DEGREES
BUN SERPL-MCNC: 20 MG/DL (ref 8–22)
CALCIUM SERPL-MCNC: 7.9 MG/DL (ref 8.5–10.5)
CHLORIDE SERPL-SCNC: 111 MMOL/L (ref 96–112)
CO2 BLDA-SCNC: 23 MMOL/L (ref 20–33)
CO2 SERPL-SCNC: 24 MMOL/L (ref 20–33)
CREAT SERPL-MCNC: 0.55 MG/DL (ref 0.5–1.4)
CRP SERPL HS-MCNC: 22.64 MG/DL (ref 0–0.75)
EOSINOPHIL # BLD AUTO: 0 K/UL (ref 0–0.51)
EOSINOPHIL NFR BLD: 0 % (ref 0–6.9)
ERYTHROCYTE [DISTWIDTH] IN BLOOD BY AUTOMATED COUNT: 54 FL (ref 35.9–50)
GFR SERPL CREATININE-BSD FRML MDRD: >60 ML/MIN/1.73 M 2
GLOBULIN SER CALC-MCNC: 2.2 G/DL (ref 1.9–3.5)
GLUCOSE BLD-MCNC: 127 MG/DL (ref 65–99)
GLUCOSE BLD-MCNC: 135 MG/DL (ref 65–99)
GLUCOSE BLD-MCNC: 148 MG/DL (ref 65–99)
GLUCOSE SERPL-MCNC: 125 MG/DL (ref 65–99)
GRAM STN SPEC: ABNORMAL
HCO3 BLDA-SCNC: 22.4 MMOL/L (ref 17–25)
HCT VFR BLD AUTO: 22.8 % (ref 42–52)
HEMOCCULT STL QL: POSITIVE
HGB BLD-MCNC: 7.7 G/DL (ref 14–18)
LYMPHOCYTES # BLD AUTO: 0.46 K/UL (ref 1–4.8)
LYMPHOCYTES NFR BLD: 5.5 % (ref 22–41)
MAGNESIUM SERPL-MCNC: 2.1 MG/DL (ref 1.5–2.5)
MANUAL DIFF BLD: NORMAL
MCH RBC QN AUTO: 32.6 PG (ref 27–33)
MCHC RBC AUTO-ENTMCNC: 33.8 G/DL (ref 33.7–35.3)
MCV RBC AUTO: 96.6 FL (ref 81.4–97.8)
MONOCYTES # BLD AUTO: 0.15 K/UL (ref 0–0.85)
MONOCYTES NFR BLD AUTO: 1.8 % (ref 0–13.4)
MORPHOLOGY BLD-IMP: NORMAL
NEUTROPHILS # BLD AUTO: 7.69 K/UL (ref 1.82–7.42)
NEUTROPHILS NFR BLD: 92.7 % (ref 44–72)
NRBC # BLD AUTO: 0 K/UL
NRBC BLD AUTO-RTO: 0 /100 WBC
O2/TOTAL GAS SETTING VFR VENT: 30 %
PCO2 BLDA: 35.7 MMHG (ref 26–37)
PCO2 TEMP ADJ BLDA: 35.1 MMHG (ref 26–37)
PH BLDA: 7.41 [PH] (ref 7.4–7.5)
PH TEMP ADJ BLDA: 7.41 [PH] (ref 7.4–7.5)
PHOSPHATE SERPL-MCNC: 2.9 MG/DL (ref 2.5–4.5)
PLATELET # BLD AUTO: 106 K/UL (ref 164–446)
PLATELET BLD QL SMEAR: NORMAL
PMV BLD AUTO: 10.5 FL (ref 9–12.9)
PO2 BLDA: 97 MMHG (ref 64–87)
PO2 TEMP ADJ BLDA: 95 MMHG (ref 64–87)
POTASSIUM SERPL-SCNC: 3.4 MMOL/L (ref 3.6–5.5)
PREALB SERPL-MCNC: 9 MG/DL (ref 18–38)
PROT SERPL-MCNC: 4.8 G/DL (ref 6–8.2)
RBC # BLD AUTO: 2.33 M/UL (ref 4.7–6.1)
RBC BLD AUTO: NORMAL
SAO2 % BLDA: 98 % (ref 93–99)
SIGNIFICANT IND 70042: ABNORMAL
SITE SITE: ABNORMAL
SODIUM SERPL-SCNC: 140 MMOL/L (ref 135–145)
SOURCE SOURCE: ABNORMAL
SPECIMEN DRAWN FROM PATIENT: ABNORMAL
WBC # BLD AUTO: 8.3 K/UL (ref 4.8–10.8)

## 2017-06-26 PROCEDURE — 94640 AIRWAY INHALATION TREATMENT: CPT

## 2017-06-26 PROCEDURE — 83735 ASSAY OF MAGNESIUM: CPT

## 2017-06-26 PROCEDURE — 770022 HCHG ROOM/CARE - ICU (200)

## 2017-06-26 PROCEDURE — 700105 HCHG RX REV CODE 258: Performed by: HOSPITALIST

## 2017-06-26 PROCEDURE — 80053 COMPREHEN METABOLIC PANEL: CPT

## 2017-06-26 PROCEDURE — A9270 NON-COVERED ITEM OR SERVICE: HCPCS

## 2017-06-26 PROCEDURE — 82962 GLUCOSE BLOOD TEST: CPT | Mod: 91

## 2017-06-26 PROCEDURE — 99291 CRITICAL CARE FIRST HOUR: CPT | Performed by: INTERNAL MEDICINE

## 2017-06-26 PROCEDURE — 700102 HCHG RX REV CODE 250 W/ 637 OVERRIDE(OP): Performed by: INTERNAL MEDICINE

## 2017-06-26 PROCEDURE — 84100 ASSAY OF PHOSPHORUS: CPT

## 2017-06-26 PROCEDURE — 36600 WITHDRAWAL OF ARTERIAL BLOOD: CPT

## 2017-06-26 PROCEDURE — 700111 HCHG RX REV CODE 636 W/ 250 OVERRIDE (IP): Performed by: HOSPITALIST

## 2017-06-26 PROCEDURE — 82272 OCCULT BLD FECES 1-3 TESTS: CPT

## 2017-06-26 PROCEDURE — 99233 SBSQ HOSP IP/OBS HIGH 50: CPT | Performed by: HOSPITALIST

## 2017-06-26 PROCEDURE — 71010 DX-CHEST-PORTABLE (1 VIEW): CPT

## 2017-06-26 PROCEDURE — 82803 BLOOD GASES ANY COMBINATION: CPT

## 2017-06-26 PROCEDURE — A9270 NON-COVERED ITEM OR SERVICE: HCPCS | Performed by: HOSPITALIST

## 2017-06-26 PROCEDURE — 85027 COMPLETE CBC AUTOMATED: CPT

## 2017-06-26 PROCEDURE — 700101 HCHG RX REV CODE 250: Performed by: INTERNAL MEDICINE

## 2017-06-26 PROCEDURE — 700102 HCHG RX REV CODE 250 W/ 637 OVERRIDE(OP): Performed by: HOSPITALIST

## 2017-06-26 PROCEDURE — 85007 BL SMEAR W/DIFF WBC COUNT: CPT

## 2017-06-26 PROCEDURE — 94003 VENT MGMT INPAT SUBQ DAY: CPT

## 2017-06-26 PROCEDURE — A9270 NON-COVERED ITEM OR SERVICE: HCPCS | Performed by: INTERNAL MEDICINE

## 2017-06-26 PROCEDURE — 700102 HCHG RX REV CODE 250 W/ 637 OVERRIDE(OP)

## 2017-06-26 PROCEDURE — 86140 C-REACTIVE PROTEIN: CPT

## 2017-06-26 PROCEDURE — 84134 ASSAY OF PREALBUMIN: CPT

## 2017-06-26 PROCEDURE — 700111 HCHG RX REV CODE 636 W/ 250 OVERRIDE (IP): Performed by: INTERNAL MEDICINE

## 2017-06-26 RX ORDER — POTASSIUM CHLORIDE 1.5 G/1.58G
40 POWDER, FOR SOLUTION ORAL ONCE
Status: COMPLETED | OUTPATIENT
Start: 2017-06-26 | End: 2017-06-26

## 2017-06-26 RX ADMIN — IPRATROPIUM BROMIDE AND ALBUTEROL SULFATE 3 ML: .5; 3 SOLUTION RESPIRATORY (INHALATION) at 22:25

## 2017-06-26 RX ADMIN — CHLORHEXIDINE GLUCONATE 15 ML: 1.2 RINSE ORAL at 08:49

## 2017-06-26 RX ADMIN — ENOXAPARIN SODIUM 40 MG: 100 INJECTION SUBCUTANEOUS at 08:48

## 2017-06-26 RX ADMIN — SENNOSIDES AND DOCUSATE SODIUM 2 TABLET: 8.6; 5 TABLET ORAL at 08:48

## 2017-06-26 RX ADMIN — IPRATROPIUM BROMIDE AND ALBUTEROL SULFATE 3 ML: .5; 3 SOLUTION RESPIRATORY (INHALATION) at 15:08

## 2017-06-26 RX ADMIN — IPRATROPIUM BROMIDE AND ALBUTEROL SULFATE 3 ML: .5; 3 SOLUTION RESPIRATORY (INHALATION) at 02:18

## 2017-06-26 RX ADMIN — IPRATROPIUM BROMIDE AND ALBUTEROL SULFATE 3 ML: .5; 3 SOLUTION RESPIRATORY (INHALATION) at 18:18

## 2017-06-26 RX ADMIN — FOLIC ACID 1 MG: 1 TABLET ORAL at 08:49

## 2017-06-26 RX ADMIN — FAMOTIDINE 20 MG: 20 TABLET, FILM COATED ORAL at 08:49

## 2017-06-26 RX ADMIN — IPRATROPIUM BROMIDE AND ALBUTEROL SULFATE 3 ML: .5; 3 SOLUTION RESPIRATORY (INHALATION) at 11:03

## 2017-06-26 RX ADMIN — SODIUM CHLORIDE: 9 INJECTION, SOLUTION INTRAVENOUS at 15:53

## 2017-06-26 RX ADMIN — PIPERACILLIN SODIUM AND TAZOBACTAM SODIUM 3.38 G: 3; .375 INJECTION, POWDER, FOR SOLUTION INTRAVENOUS at 09:02

## 2017-06-26 RX ADMIN — CHLORHEXIDINE GLUCONATE 15 ML: 1.2 RINSE ORAL at 20:06

## 2017-06-26 RX ADMIN — SODIUM CHLORIDE: 9 INJECTION, SOLUTION INTRAVENOUS at 04:41

## 2017-06-26 RX ADMIN — PIPERACILLIN SODIUM AND TAZOBACTAM SODIUM 3.38 G: 3; .375 INJECTION, POWDER, FOR SOLUTION INTRAVENOUS at 15:10

## 2017-06-26 RX ADMIN — FAMOTIDINE 20 MG: 20 TABLET, FILM COATED ORAL at 20:06

## 2017-06-26 RX ADMIN — POTASSIUM CHLORIDE 40 MEQ: 1.5 POWDER, FOR SOLUTION ORAL at 08:49

## 2017-06-26 RX ADMIN — KETOROLAC TROMETHAMINE 1 DROP: 4 SOLUTION/ DROPS OPHTHALMIC at 09:00

## 2017-06-26 RX ADMIN — PROPOFOL 20 MCG/KG/MIN: 10 INJECTION, EMULSION INTRAVENOUS at 17:34

## 2017-06-26 RX ADMIN — KETOROLAC TROMETHAMINE 1 DROP: 4 SOLUTION/ DROPS OPHTHALMIC at 20:06

## 2017-06-26 RX ADMIN — THERA TABS 1 TABLET: TAB at 08:49

## 2017-06-26 RX ADMIN — PIPERACILLIN SODIUM AND TAZOBACTAM SODIUM 3.38 G: 3; .375 INJECTION, POWDER, FOR SOLUTION INTRAVENOUS at 03:23

## 2017-06-26 RX ADMIN — Medication 100 MG: at 08:49

## 2017-06-26 RX ADMIN — PROPOFOL 20 MCG/KG/MIN: 10 INJECTION, EMULSION INTRAVENOUS at 06:31

## 2017-06-26 RX ADMIN — IPRATROPIUM BROMIDE AND ALBUTEROL SULFATE 3 ML: .5; 3 SOLUTION RESPIRATORY (INHALATION) at 07:02

## 2017-06-26 RX ADMIN — PIPERACILLIN SODIUM AND TAZOBACTAM SODIUM 3.38 G: 3; .375 INJECTION, POWDER, FOR SOLUTION INTRAVENOUS at 20:06

## 2017-06-26 NOTE — PROGRESS NOTES
Pulmonary Critical Care Progress Note        Date of Service: 6/26/2017      Chief Complaint: GLF and ETOH    History of Present Illness:   This is a 77-year-old male admitted to the ICU through the ED on 6/23/17 for acute alcohol intoxication and ground level fall. Patient intubated in the ED.     ROS:  Respiratory: unable to perform due to the patient's inability to effectively communicate, Cardiac: unable to perform due to the patient's inability to effectively communicate, GI: unable to perform due to the patient's inability to effectively communicate.  All other systems negative.    Interval Events:  24 hour interval history reviewed   Tmax 103.6 and tachycardic overnight. Received Tylenol x1 and cooling blanket.   Withdrawals and grimaces to pain.   SR/ST with frequent PVC, -120 systolic.   Last BM yesterday, stool sample sent for occult blood.   H&H decreasing.   Decreased urine output via grover.  Non-mobilized.  Extubated and reintubated yesterday.   SBT 5/8 for 1hr this AM- RSBI 89, not following for other parameters.  CXR with small left pleural effusion, otherwise all lung fields appear clear.     PFSH:  No change.    Physical Exam:  General:  Resting comfortably on phenobarbital.   HEENT:  Pupils 3-4mm and reactive to light, periorbital swelling, no scleral edema.   CVS:  RRR, no murmur.  Respiratory:  Clear to auscultation.  Abdomen:  Soft, NT.  Extremities:  2+ dpp, not moving extremities spontaneously or noxious stimuli.   Neuro/Psych:  Grimaces with any noxious stimuli.      Respiratory:  Carbajal Vent Mode: APVCMV, Rate (breaths/min): 20, Vt Target (mL): 460, PEEP/CPAP: 8, FiO2: 30, Static Compliance (ml / cm H2O): 87.2, Control VTE (exp VT): 402  Pulse Oximetry: 99 %  ImagingAvailable data reviewed   Recent Labs      06/24/17   1952 06/25/17   0432  06/26/17   0431   ISTATAPH  7.398*  7.439  7.406   ISTATAPCO2  39.6*  33.8  35.7   ISTATAPO2  104*  68  97*   ISTATATCO2  26  24  23    [id number]  98  94  98   ISTATARTHCO3  24.5  22.9  22.4   ISTATARTBE  0  -1  -2   ISTATTEMP  39.0 C  37.0 C  97.9 F   ISTATFIO2  70  30  30   ISTATSPEC  Arterial  Arterial  Arterial   ISTATAPHTC  7.369*  7.439  7.411   [id number]  117*  68  95*     HemoDynamics:  Pulse: 76, Heart Rate (Monitored): 77  Blood Pressure : 120/49 mmHg, NIBP: (!) 94/39 mmHg     Imaging: Available data reviewed   Maintenance fluid 100     Neuro:  GCS Total [place] Coma Score: 6  Imaging: Available data reviewed   Propofol 20     Fluids:  Intake/Output       06/24/17 0700 - 06/25/17 0659 06/25/17 0700 - 06/26/17 0659 06/26/17 0700 - 06/27/17 0659      7065-0691 3909-2779 Total 8298-1800 2076-2010 Total 2497-7772 4755-7312 Total       Intake    I.V.  2014.4  1596.3 3610.6  2001.3  1332.1 3333.4  --  -- --    Propofol Volume 114.4 196.3 310.6 251.3 132.1 383.4 -- -- --    IV Volume (Normal saline) 1200 1200 2400 1200 1200 2400 -- -- --    IV Volume (IVPB) 700 200 900 550 -- 550 -- -- --    Blood  --  -- --  280  -- 280  --  -- --    Volume (RELEASE RED BLOOD CELLS) -- -- -- 280 -- 280 -- -- --    Other  60  -- 60  60  90 150  --  -- --    Medications (P.O./ Enteral Liquids) 60 -- 60 60 90 150 -- -- --    Enteral  220  45 265  370  600 970  --  -- --    Enteral Volume 160 15 175 280 420 700 -- -- --    Free Water / Tube Flush 60 30 90 90 180 270 -- -- --    Total Intake 2294.4 1641.3 3935.6 2711.3 2022.1 4733.4 -- -- --       Output    Urine  1325  750 2075  600  706 1306  --  -- --    Indwelling Cathether 5694 130 0846  -- -- --    Drains  --  -- --  --  0 0  --  -- --    Residual Amount (ml) (Discarded) -- -- -- -- 0 0 -- -- --    Stool  --  -- --  --  -- --  --  -- --    Number of Times Stooled -- -- -- -- 1 x 1 x -- -- --    Total Output 6692 489 6268  -- -- --       Net I/O     969.4 891.3 1860.6 2111.3 1316.1 3427.4 -- -- --           Recent Labs      06/23/17   1030  06/24/17   0330  06/25/17   0512   SODIUM    --   139  143   POTASSIUM   --   3.3*  3.1*   CHLORIDE   --   113*  118*   CO2   --   21  17*   BUN   --   10  9   CREATININE   --   0.50  0.43*   MAGNESIUM  1.5  2.0  1.8   PHOSPHORUS  2.6  1.7*  2.3*   CALCIUM   --   7.1*  6.4*     GI/Nutrition:  Imaging: Available data reviewed  NPO     Liver Function  Recent Labs      17   0330  17   0512   GLUCOSE  132*  97     Heme:  Recent Labs      17   0330  17   0512  17   1207   RBC  2.37*  2.06*  2.97*   HEMOGLOBIN  7.9*  6.8*  9.8*   HEMATOCRIT  23.3*  20.6*  29.3*   PLATELETCT  113*  95*  130*     Infectious Disease:  Temp  Av.2 °C (100.8 °F)  Min: 37.1 °C (98.8 °F)  Max: 39.8 °C (103.6 °F)  Micro: reviewed   Recent Labs      17   03317   0512  17   1207   WBC  9.5  7.0  7.2   NEUTSPOLYS  86.20*  85.50*  93.20*   LYMPHOCYTES  7.20*  7.70*  3.20*   MONOCYTES  6.00  6.00  2.80   EOSINOPHILS  0.20  0.40  0.10   BASOPHILS  0.10  0.10  0.10     Current Facility-Administered Medications   Medication Dose Frequency Provider Last Rate Last Dose   • piperacillin-tazobactam (ZOSYN) 3.375 g in  mL IVPB  3.375 g Q6HRS Pedro Seay D.O.   Stopped at 17 4023   • oxycodone immediate-release (ROXICODONE) tablet 5 mg  5 mg Q4HRS PRN Gunnar Bryan M.D.       • oxycodone immediate release (ROXICODONE) tablet 10 mg  10 mg Q4HRS PRN Gunnar Bryan M.D.       • lorazepam (ATIVAN) injection 1-2 mg  1-2 mg Q4HRS PRN Gunnar Bryan M.D.   1 mg at 17 1248   • MD ALERT...Phenobarbital Alcohol Withdrawal Protocol Pharmacist to Implement (ICU Only)   PRN Gunnar Bryan M.D.       • Pharmacy Consult Request 1 Each  1 Each PRN Francisco Balderas PHARMD        And   • PHENObarbital injection 130 mg  130 mg Q30 MIN PRN Francisco Balderas PHARMD        And   • PHENObarbital injection 260 mg  260 mg Q30 MIN PRN Francisco Balderas PHARMD   260 mg at 17 1420   • KETOROLAC TROMETHAMINE (OPHTH) 0.4 % SOLN 1 Drop  1 Drop TWICE  DAILY Gunnar Bryan M.D.   1 Drop at 06/25/17 2106   • Respiratory Care per Protocol   Continuous RT Gunnar Bryan M.D.       • MD ALERT...Adult ICU Electrolyte Replacement per Pharmacy Protocol   pharmacy to dose Gunnar Bryan M.D.       • fentaNYL (SUBLIMAZE) injection 25 mcg  25 mcg Q HOUR PRN Gunnar Bryan M.D.        Or   • fentaNYL (SUBLIMAZE) injection 50 mcg  50 mcg Q HOUR PRN Gunnar Bryan M.D.        Or   • fentaNYL (SUBLIMAZE) injection 100 mcg  100 mcg Q HOUR PRN Gunnar Bryan M.D.       • ipratropium-albuterol (DUONEB) nebulizer solution 3 mL  3 mL Q2HRS PRN (RT) Gunnar Bryan M.D.       • ipratropium-albuterol (DUONEB) nebulizer solution 3 mL  3 mL Q4HRS (RT) Gunnar Bryan M.D.   3 mL at 06/26/17 0218   • acetaminophen (TYLENOL) tablet 650 mg  650 mg Q4HRS PRN Pedro Vivas M.D.   650 mg at 06/25/17 2016   • acetaminophen (TYLENOL) suppository 650 mg  650 mg Q6HRS PRN Pedro Vivas M.D.   650 mg at 06/24/17 1955   • insulin lispro (HUMALOG) injection 2-9 Units  2-9 Units Q6HRS Tunde Panchal M.D.   Stopped at 06/24/17 1800   • NS infusion   Continuous Tunde Panchal M.D. 100 mL/hr at 06/25/17 1411     • glucose 4 g chewable tablet 16 g  16 g Q15 MIN PRN Kaya Gonzalez, KENDALLD       • dextrose 50% (D50W) injection 25 mL  25 mL Q15 MIN PRN KENDALL KoD       • thiamine (THIAMINE) tablet 100 mg  100 mg DAILY Tunde Panchal M.D.   100 mg at 06/25/17 0812   • folic acid (FOLVITE) tablet 1 mg  1 mg DAILY Tunde Panchal M.D.   1 mg at 06/25/17 0813   • multivitamin (THERAGRAN) tablet 1 Tab  1 Tab DAILY Pedro Vivas M.D.   1 Tab at 06/25/17 0813   • senna-docusate (PERICOLACE or SENOKOT S) 8.6-50 MG per tablet 2 Tab  2 Tab BID Pedro Vivas M.D.   2 Tab at 06/25/17 2016    And   • polyethylene glycol/lytes (MIRALAX) PACKET 1 Packet  1 Packet QDAY PRN Pedro Vivas M.D.        And   • magnesium hydroxide (MILK OF MAGNESIA)  suspension 30 mL  30 mL QDAY PRN Pedro Vivas M.D.        And   • bisacodyl (DULCOLAX) suppository 10 mg  10 mg QDAY PRN Pedro Vivas M.D.       • chlorhexidine (PERIDEX) 0.12 % solution 15 mL  15 mL BID Pedro Vivas M.D.   15 mL at 06/25/17 2016   • lidocaine (XYLOCAINE) 1%  injection  1-2 mL Q30 MIN PRN Pedro Vivas M.D.       • enoxaparin (LOVENOX) inj 40 mg  40 mg DAILY Pedro Vivas M.D.   40 mg at 06/25/17 1016   • famotidine (PEPCID) tablet 20 mg  20 mg Q12HRS Pedro Vivas M.D.   20 mg at 06/25/17 2016    Or   • famotidine (PEPCID) injection 20 mg  20 mg Q12HRS Pedro Vivas M.D.   20 mg at 06/24/17 2004   • propofol (DIPRIVAN) injection  5-80 mcg/kg/min Continuous Pedro Vivas M.D. 10 mL/hr at 06/26/17 0447 20 mcg/kg/min at 06/26/17 0447     Last reviewed on 6/23/2017  7:57 PM by Ángel Bazan RRANULFO.    Quality  Measures:  Labs reviewed, Medications reviewed and Radiology images reviewed  Herring catheter: Critically Ill - Requiring Accurate Measurement of Urinary Output  Central line in place: Need for access and Sepsis      DVT prophylaxis - mechanical: SCDs  Ulcer prophylaxis: Yes  Antibiotics: Treating active infection/contamination beyond 24 hours perioperative coverage        Assessment and Plan:  Ventilator-dependent respiratory failure.   - trial extubation 6/24   - increasing agitation and resp distress and re-intubated in several hours 6/24   - s/p bronch with therapeutic airway lavage on 6/24; sent for cx   - back on full vent support  Acute Hypoxic Respiratory Failure   - s/p bronch with BAL: Pseudomonas aeruginosa and Klebsiella pneumonia   - Started Zosyn  Status post ground level fall with facial trauma   - 3 cm laceration to his lip, which was sutured in [place], [place]   - seen by trauma and cleared    - anemia; drop in hgb again; Trauma following; CT abd/pelvis neg today; follow  Status post aspiration.   -  empiric abx; f/u cxr  Acute alcohol intoxication with blood alcohol level of 0.206 in [place] at the time of presentation.   - sedation; monitor for w/d   - follow/correct lytes   - rally vits  Alcohol abuse.  Acute blood loss anemia.   - follow HH  Hyponatremia.  Incomplete historical database secondary to inability to interview the patient.  Presumed chronic lung disease based upon his medication list.  Presumed dyslipidemia.  Presumed diabetes mellitus   - glycemic control    Discussed patient condition and risk of morbidity and/or mortality with RN, RT, Pharmacy, Charge nurse / hot rounds and hospitalist.    The patient remains critically ill.  Critical care time = 34 minutes in directly providing and coordinating critical care and extensive data review.  No time overlap and excludes procedures.    Maikel SOTOMAYOR (Alison), am scribing for, and in the presence of, Melida Baeza M.D.    Electronically signed by: Maikel Carrasco (Alison), 6/26/2017    Melida SOTOMAYOR M.D. personally performed the services described in this documentation, as scribed by Maikel Carrasco in my presence, and it is both accurate and complete.

## 2017-06-26 NOTE — CARE PLAN
Problem: Skin Integrity  Goal: Risk for impaired skin integrity will decrease  Skin assessed at start of shift. Nutrition, moisture, sensory, activity, mobility, friction and shear assessed and addressed with patient and family. Juan documented.        Problem: Hemodynamic Status  Goal: Vital Signs and Fluid Balance Management  Outcome: PROGRESSING AS EXPECTED

## 2017-06-26 NOTE — CARE PLAN
Problem: Nutritional:  Goal: Nutrition support tolerated and meeting greater than 85% of estimated needs  Outcome: PROGRESSING AS EXPECTED  TF @ goal with propofol. TF goal without propofol Impact Peptide 1.5 @ 50 ml/hr

## 2017-06-26 NOTE — CARE PLAN
Problem: Ventilation Defect:  Goal: Ability to achieve and maintain unassisted ventilation or tolerate decreased levels of ventilator support  Outcome: PROGRESSING AS EXPECTED  Adult Ventilation Update    Total Vent Days: 3      Patient Lines/Drains/Airways Status    Active Airway      Name: Placement date: Placement time: Site: Days:     Airway Group ET Tube 8.0 @ 25 06/24/17 1845    less than 1               APVcmv  20  /  460  /  +8  /  30%  DUO q4    In the last 24 hours, the patient tolerated SBT for 1 hour on settings of 5 over 8.    #FVC / Vital Capacity (liters) : 0.8 (06/25/17 0749)  NIF (cm H2O) : -13 (06/25/17 0749)  Rapid Shallow Breathing Index (RR/VT): 10 (06/25/17 0749)  Plateau Pressure (Q Shift): 18 (06/25/17 1408)  Static Compliance (ml / cm H2O): 48 (06/25/17 1408)    Patient failed trials because of Barriers to Wean: Other (Comments) (06/24/17 0700)  Barriers to SBT Weaning Trial Stopped due to:: Pt weaned for 1 hour and returned to rest settings per protocol (06/25/17 0749)  Length of Weaning Trial Length of Weaning Trial (Hours): 1 (06/25/17 0749)    Cough: Productive (06/25/17 1408)  Sputum Amount: Moderate (06/25/17 1408)  Sputum Color: White;Yellow (06/25/17 1408)  Sputum Consistency: Thick (06/25/17 1408)    Events/Summary/Plan: vent check and in line med given (06/25/17 1408)

## 2017-06-26 NOTE — PROGRESS NOTES
Assumed care of patient at 1900. Received bedside report from CARRIE Cordon. Patient comfortable in bed. See restraint charting. Monitor alarms ranges verified. Bed alarm set. Drips verified. Patient intubated and sedated. Will continue to closely monitor.

## 2017-06-26 NOTE — RESPIRATORY CARE
Ventilator Weaning Update    Patient is on vent day 4.  SBT was tolerated for a minimum of 1 hours on settings of 5 over 8.    Wean parameters for this SBT were:  #FVC / Vital Capacity (liters) :  (not following) (06/26/17 0808)  NIF (cm H2O) :  (not following) (06/26/17 0808)  Rapid Shallow Breathing Index (RR/VT): 89 (06/26/17 0808)  RR (bpm): 26 (06/26/17 0808)  Spontaneous VE: 7.9 (06/26/17 0808)  Spontaneous VT: 323 (06/26/17 0808)    Events/Summary/Plan: parameters obtained and placed pt back on rest settings (06/26/17 0808)

## 2017-06-26 NOTE — PROCEDURES
Procedure: central line    Indication: Septic Shock    Consent: Verbal and written    Description: a time-out was called. The right neck was prepped and draped in a sterile fashion.  Using sterile ultrasound guidance, the right internal jugular vein was identified. 2 mL of 1% lidocaine was used for anesthesia.  A large bore needle was used to aspirate dark, red, non-pulsating blood. Seldinger technique was used to place the central venous catheter. All three ports were aspirated and flushed with sterile saline.  The central line was adhered to the skin with sutures.     Blood loss: <2  mL    A stat chest x-ray is pending at this time.

## 2017-06-26 NOTE — CARE PLAN
Problem: Ventilation Defect:  Goal: Ability to achieve and maintain unassisted ventilation or tolerate decreased levels of ventilator support  Intervention: Support and monitor invasive and noninvasive mechanical ventilation  Adult Ventilation Update    Total Vent Days: 3      Patient Lines/Drains/Airways Status    Active Airway      Name: Placement date: Placement time: Site: Days:     Airway Group ET Tube 8.0 06/24/17 1845    3                 In the last 24 hours, the patient tolerated SBT for 1 hour on settings of 5/8.    #FVC / Vital Capacity (liters) : 1.9 (06/25/17 1930)  NIF (cm H2O) : -11 (06/25/17 1930)  Rapid Shallow Breathing Index (RR/VT): 69 (06/25/17 1930)  Plateau Pressure (Q Shift): 18 (06/25/17 1408)  Static Compliance (ml / cm H2O): 87.2 (06/26/17 0221)    Patient failed trials because of Barriers to Wean: Other (Comments) (06/24/17 0700)  Barriers to SBT Weaning Trial Stopped due to:: Pt weaned for 1 hour and returned to rest settings per protocol (06/25/17 1930)  Length of Weaning Trial Length of Weaning Trial (Hours): 1 (06/25/17 1930)    Sputum/Suction   Cough: Productive (06/26/17 0400)  Sputum Amount: Moderate (06/26/17 0400)  Sputum Color: Tan;White (06/26/17 0400)  Sputum Consistency: Thick (06/26/17 0400)    Events/Summary/Plan: Patient SBT for total of 1 hour. Placed back on rest settings overnight. Will continue to monitor.

## 2017-06-26 NOTE — CARE PLAN
Problem: Pain Management  Goal: Pain level will decrease to patient’s comfort goal  Pain assessed using CPOT paint scale see documentation in flow sheets.     Problem: Risk of Aspiration  Goal: Absence of aspiration  Patient in intubated and has cor trak. Head of bed elevated to 30 degrees. Suctioned once or twice per encounter.

## 2017-06-26 NOTE — CARE PLAN
Problem: Bowel/Gastric:  Goal: Normal bowel function is maintained or improved  Outcome: PROGRESSING SLOWER THAN EXPECTED  Patient has gone several day without BM. See MAR, bowel sounds assessed q 4 hours, and bowel protocol in place. Will continue to assess.   Intervention: Educate patient and significant other/support system about diet, fluid intake, medications and activity to promote bowel function  Patient intubated and sedated  Intervention: Collaborate with Interdisciplinary Team for optimal positioning for bowel evacuation  Collaboration done. Rounds daily.       Problem: Skin Integrity  Goal: Risk for impaired skin integrity will decrease  Outcome: PROGRESSING SLOWER THAN EXPECTED  Patient a risk for skin breakdown. Please see Juan skin score.   Intervention: Assess risk factors for impaired skin integrity and/or pressure ulcers  Q2 hour turns, q1-2 hour skin assessment (regarding equipment being in contact with skin), use moisturizers, mouth swabs q1-2 hours, and tube feeding and vitamin therapy assessed.

## 2017-06-26 NOTE — PROGRESS NOTES
MD aware of patient's rising temperature. See MAR. Cooling blanket on from beginning of shift and additional ice packs applied.

## 2017-06-26 NOTE — PROGRESS NOTES
[facility] Hospitalist Progress Note    Date of Service: 2017    Chief Complaint  77 y.o. male admitted 2017 with alcohol abuse admitted after fall and intubated in ED.    Interval Problem Update  Tmax:103.6.  Withdraws to pain.  Cooling blanket on all night.  On ventilator.      Consultants/Specialty  Pulmonary    Disposition  Watch for withdrawals        Review of Systems   Unable to perform ROS: acuity of condition      Physical Exam  Laboratory/Imaging   Hemodynamics  Temp (24hrs), Av.8 °C (100.1 °F), Min:37.2 °C (99 °F), Max:38.9 °C (102.1 °F)   Temperature: 37.7 °C (99.8 °F)  Pulse  Av.3  Min: 65  Max: 127 Heart Rate (Monitored): 68  NIBP: 128/62 mmHg      Respiratory  Carbajal Vent Mode: APVCMV, Rate (breaths/min): 20, PEEP/CPAP: 8, PEEP/CPAP: 8, FiO2: 30, P Peak (PIP): 22, P MEAN: 13   Respiration: 20, Pulse Oximetry: 99 %     Work Of Breathing / Effort: Vented  RUL Breath Sounds: Clear, RML Breath Sounds: Diminished, RLL Breath Sounds: Diminished, LION Breath Sounds: Clear, LLL Breath Sounds: Diminished    Fluids    Intake/Output Summary (Last 24 hours) at 17 2221  Last data filed at 17 1800   Gross per 24 hour   Intake 3352.08 ml   Output   1021 ml   Net 2331.08 ml       Nutrition  Orders Placed This Encounter   Procedures   • Diet NPO     Standing Status: Standing      Number of Occurrences: 1      Standing Expiration Date:      Order Specific Question:  Type:     Answer:  Now [1]     Order Specific Question:  Restrict to:     Answer:  Strict [1]     Physical Exam   Constitutional: No distress. He is sedated, intubated and restrained.   obese   HENT:   Head: Normocephalic and atraumatic.   Nose: Nose normal.   Eyes: Conjunctivae are normal. Right eye exhibits no discharge. Left eye exhibits no discharge. No scleral icterus.   Neck: No tracheal deviation present.   Cardiovascular: Normal rate, regular rhythm, normal heart sounds and intact distal pulses.    No murmur  heard.  Pulmonary/Chest: Effort normal and breath sounds normal. No stridor. He is intubated. No respiratory distress. He has no wheezes.   Abdominal: Soft. Bowel sounds are normal. He exhibits no distension. There is no tenderness.   Musculoskeletal: He exhibits no edema.   Skin: Skin is warm. He is not diaphoretic.   Vitals reviewed.      Recent Labs      06/25/17   0512  06/25/17   1207  06/26/17   0520   WBC  7.0  7.2  8.3   RBC  2.06*  2.97*  2.33*   HEMOGLOBIN  6.8*  9.8*  7.7*   HEMATOCRIT  20.6*  29.3*  22.8*   MCV  100.0*  98.7*  96.6   MCH  33.0  33.0  32.6   MCHC  33.0*  33.4*  33.8   RDW  51.0*  55.0*  54.0*   PLATELETCT  95*  130*  106*   MPV  10.8  10.7  10.5     Recent Labs      06/24/17   0330  06/25/17   0512  06/26/17   0520   SODIUM  139  143  140   POTASSIUM  3.3*  3.1*  3.4*   CHLORIDE  113*  118*  111   CO2  21  17*  24   GLUCOSE  132*  97  125*   BUN  10  9  20   CREATININE  0.50  0.43*  0.55   CALCIUM  7.1*  6.4*  7.9*             Recent Labs      06/25/17   0512   TRIGLYCERIDE  122          Assessment/Plan     Aspiration pneumonitis (CMS-HCC)  Assessment & Plan  Extubated 6/24 afternoon but required reintubation  abx  Monitor vitals, I/Os  Fever but no significant increase in WBC  Cooling blanket, tylenol for fever.    Respiratory failure (CMS-HCC)  Assessment & Plan  Pulmonary consulting.  Monitor ABG, CXR, vitals, I/O's  Wean once not sign of EtOH w/d.  6/24 resp culture w/ and Klebsiella & Pseudomonas light growth  Changed Unasyn to Zosyn 6/25    Alcohol abuse (present on admission)  Assessment & Plan  Watch for w/d.  Phenobarbital protocol  Folate, thiamine.    Anemia  Assessment & Plan  Drop in Hgb. No sign of acute bleed.  Transfused 1 unit pRBC's 6/25  Monitor cbc  stool guaiac +.  Start PPI    Hypokalemia  Assessment & Plan  Replace and recheck bmp      Radiology images reviewed, Labs reviewed and Medications reviewed  Herring catheter: Critically Ill - Requiring Accurate Measurement  of Urinary Output and Unconscious / Sedated Patient on a Ventilator

## 2017-06-26 NOTE — PROGRESS NOTES
Trauma    Reintubated yesterday afternoon.  Hb continued to drop. CT abd/pelvis showed no injury. CXR shows no evidence at all of hemothorax. Suspect dilutional after resuscitation. He has no injuries and is being managed medically for aspiration pneumonia.   Trauma will sign off.   Please call with questions.     Devang Gaspar MD  [phone]

## 2017-06-27 ENCOUNTER — APPOINTMENT (OUTPATIENT)
Dept: RADIOLOGY | Facility: MEDICAL CENTER | Age: 77
DRG: 163 | End: 2017-06-27
Attending: INTERNAL MEDICINE
Payer: MEDICARE

## 2017-06-27 LAB
ANION GAP SERPL CALC-SCNC: 5 MMOL/L (ref 0–11.9)
BASE EXCESS BLDA CALC-SCNC: 0 MMOL/L (ref -4–3)
BASOPHILS # BLD AUTO: 0 % (ref 0–1.8)
BASOPHILS # BLD: 0 K/UL (ref 0–0.12)
BODY TEMPERATURE: NORMAL DEGREES
BUN SERPL-MCNC: 20 MG/DL (ref 8–22)
CALCIUM SERPL-MCNC: 7.8 MG/DL (ref 8.5–10.5)
CHLORIDE SERPL-SCNC: 108 MMOL/L (ref 96–112)
CO2 BLDA-SCNC: 25 MMOL/L (ref 20–33)
CO2 SERPL-SCNC: 25 MMOL/L (ref 20–33)
CREAT SERPL-MCNC: 0.51 MG/DL (ref 0.5–1.4)
EOSINOPHIL # BLD AUTO: 0.16 K/UL (ref 0–0.51)
EOSINOPHIL NFR BLD: 2.7 % (ref 0–6.9)
ERYTHROCYTE [DISTWIDTH] IN BLOOD BY AUTOMATED COUNT: 53.8 FL (ref 35.9–50)
GFR SERPL CREATININE-BSD FRML MDRD: >60 ML/MIN/1.73 M 2
GLUCOSE SERPL-MCNC: 134 MG/DL (ref 65–99)
HCO3 BLDA-SCNC: 23.7 MMOL/L (ref 17–25)
HCT VFR BLD AUTO: 23.7 % (ref 42–52)
HGB BLD-MCNC: 7.9 G/DL (ref 14–18)
LYMPHOCYTES # BLD AUTO: 0.21 K/UL (ref 1–4.8)
LYMPHOCYTES NFR BLD: 3.5 % (ref 22–41)
MAGNESIUM SERPL-MCNC: 1.8 MG/DL (ref 1.5–2.5)
MANUAL DIFF BLD: NORMAL
MCH RBC QN AUTO: 32.4 PG (ref 27–33)
MCHC RBC AUTO-ENTMCNC: 33.3 G/DL (ref 33.7–35.3)
MCV RBC AUTO: 97.1 FL (ref 81.4–97.8)
MONOCYTES # BLD AUTO: 0.37 K/UL (ref 0–0.85)
MONOCYTES NFR BLD AUTO: 6.2 % (ref 0–13.4)
MORPHOLOGY BLD-IMP: NORMAL
NEUTROPHILS # BLD AUTO: 5.26 K/UL (ref 1.82–7.42)
NEUTROPHILS NFR BLD: 87.6 % (ref 44–72)
NRBC # BLD AUTO: 0 K/UL
NRBC BLD AUTO-RTO: 0 /100 WBC
O2/TOTAL GAS SETTING VFR VENT: 30 %
PCO2 BLDA: 33.9 MMHG (ref 26–37)
PCO2 TEMP ADJ BLDA: 34.6 MMHG (ref 26–37)
PH BLDA: 7.45 [PH] (ref 7.4–7.5)
PH TEMP ADJ BLDA: 7.45 [PH] (ref 7.4–7.5)
PHOSPHATE SERPL-MCNC: 3 MG/DL (ref 2.5–4.5)
PLATELET # BLD AUTO: 116 K/UL (ref 164–446)
PLATELET BLD QL SMEAR: NORMAL
PMV BLD AUTO: 10.3 FL (ref 9–12.9)
PO2 BLDA: 71 MMHG (ref 64–87)
PO2 TEMP ADJ BLDA: 73 MMHG (ref 64–87)
POTASSIUM SERPL-SCNC: 3.6 MMOL/L (ref 3.6–5.5)
POTASSIUM SERPL-SCNC: 3.7 MMOL/L (ref 3.6–5.5)
POTASSIUM SERPL-SCNC: 4 MMOL/L (ref 3.6–5.5)
POTASSIUM SERPL-SCNC: 4.1 MMOL/L (ref 3.6–5.5)
PROCALCITONIN SERPL-MCNC: 2.18 NG/ML
RBC # BLD AUTO: 2.44 M/UL (ref 4.7–6.1)
RBC BLD AUTO: NORMAL
SAO2 % BLDA: 95 % (ref 93–99)
SODIUM SERPL-SCNC: 138 MMOL/L (ref 135–145)
SPECIMEN DRAWN FROM PATIENT: NORMAL
WBC # BLD AUTO: 6 K/UL (ref 4.8–10.8)

## 2017-06-27 PROCEDURE — 85027 COMPLETE CBC AUTOMATED: CPT

## 2017-06-27 PROCEDURE — 700111 HCHG RX REV CODE 636 W/ 250 OVERRIDE (IP): Performed by: INTERNAL MEDICINE

## 2017-06-27 PROCEDURE — 700111 HCHG RX REV CODE 636 W/ 250 OVERRIDE (IP): Performed by: HOSPITALIST

## 2017-06-27 PROCEDURE — 94003 VENT MGMT INPAT SUBQ DAY: CPT

## 2017-06-27 PROCEDURE — A9270 NON-COVERED ITEM OR SERVICE: HCPCS | Performed by: HOSPITALIST

## 2017-06-27 PROCEDURE — 700101 HCHG RX REV CODE 250: Performed by: INTERNAL MEDICINE

## 2017-06-27 PROCEDURE — 94640 AIRWAY INHALATION TREATMENT: CPT

## 2017-06-27 PROCEDURE — 700105 HCHG RX REV CODE 258: Performed by: HOSPITALIST

## 2017-06-27 PROCEDURE — 700102 HCHG RX REV CODE 250 W/ 637 OVERRIDE(OP): Performed by: HOSPITALIST

## 2017-06-27 PROCEDURE — 99232 SBSQ HOSP IP/OBS MODERATE 35: CPT | Performed by: HOSPITALIST

## 2017-06-27 PROCEDURE — 82803 BLOOD GASES ANY COMBINATION: CPT

## 2017-06-27 PROCEDURE — 71010 DX-CHEST-PORTABLE (1 VIEW): CPT

## 2017-06-27 PROCEDURE — 85007 BL SMEAR W/DIFF WBC COUNT: CPT

## 2017-06-27 PROCEDURE — 84100 ASSAY OF PHOSPHORUS: CPT

## 2017-06-27 PROCEDURE — 84145 PROCALCITONIN (PCT): CPT

## 2017-06-27 PROCEDURE — 84132 ASSAY OF SERUM POTASSIUM: CPT | Mod: 91

## 2017-06-27 PROCEDURE — 770022 HCHG ROOM/CARE - ICU (200)

## 2017-06-27 PROCEDURE — 99291 CRITICAL CARE FIRST HOUR: CPT | Performed by: INTERNAL MEDICINE

## 2017-06-27 PROCEDURE — 83735 ASSAY OF MAGNESIUM: CPT

## 2017-06-27 PROCEDURE — 36600 WITHDRAWAL OF ARTERIAL BLOOD: CPT

## 2017-06-27 PROCEDURE — 80048 BASIC METABOLIC PNL TOTAL CA: CPT

## 2017-06-27 PROCEDURE — 700102 HCHG RX REV CODE 250 W/ 637 OVERRIDE(OP): Performed by: INTERNAL MEDICINE

## 2017-06-27 PROCEDURE — A9270 NON-COVERED ITEM OR SERVICE: HCPCS | Performed by: INTERNAL MEDICINE

## 2017-06-27 RX ORDER — MAGNESIUM SULFATE HEPTAHYDRATE 40 MG/ML
2 INJECTION, SOLUTION INTRAVENOUS ONCE
Status: COMPLETED | OUTPATIENT
Start: 2017-06-27 | End: 2017-06-27

## 2017-06-27 RX ORDER — POTASSIUM CHLORIDE 7.45 MG/ML
10 INJECTION INTRAVENOUS ONCE
Status: COMPLETED | OUTPATIENT
Start: 2017-06-27 | End: 2017-06-27

## 2017-06-27 RX ORDER — POTASSIUM CHLORIDE 1.5 G/1.58G
40 POWDER, FOR SOLUTION ORAL ONCE
Status: COMPLETED | OUTPATIENT
Start: 2017-06-27 | End: 2017-06-27

## 2017-06-27 RX ORDER — FUROSEMIDE 10 MG/ML
40 INJECTION INTRAMUSCULAR; INTRAVENOUS
Status: DISCONTINUED | OUTPATIENT
Start: 2017-06-27 | End: 2017-06-30

## 2017-06-27 RX ORDER — POTASSIUM CHLORIDE 20 MEQ/1
40 TABLET, EXTENDED RELEASE ORAL ONCE
Status: COMPLETED | OUTPATIENT
Start: 2017-06-27 | End: 2017-06-27

## 2017-06-27 RX ADMIN — IPRATROPIUM BROMIDE AND ALBUTEROL SULFATE 3 ML: .5; 3 SOLUTION RESPIRATORY (INHALATION) at 11:53

## 2017-06-27 RX ADMIN — FENTANYL CITRATE 100 MCG: 50 INJECTION, SOLUTION INTRAMUSCULAR; INTRAVENOUS at 17:11

## 2017-06-27 RX ADMIN — IPRATROPIUM BROMIDE AND ALBUTEROL SULFATE 3 ML: .5; 3 SOLUTION RESPIRATORY (INHALATION) at 14:53

## 2017-06-27 RX ADMIN — MAGNESIUM SULFATE IN WATER 2 G: 40 INJECTION, SOLUTION INTRAVENOUS at 09:34

## 2017-06-27 RX ADMIN — CHLORHEXIDINE GLUCONATE 15 ML: 1.2 RINSE ORAL at 20:01

## 2017-06-27 RX ADMIN — CHLORHEXIDINE GLUCONATE 15 ML: 1.2 RINSE ORAL at 09:24

## 2017-06-27 RX ADMIN — ACETAMINOPHEN 650 MG: 325 TABLET, FILM COATED ORAL at 15:43

## 2017-06-27 RX ADMIN — POTASSIUM CHLORIDE 10 MEQ: 7.46 INJECTION, SOLUTION INTRAVENOUS at 12:46

## 2017-06-27 RX ADMIN — PROPOFOL 20 MCG/KG/MIN: 10 INJECTION, EMULSION INTRAVENOUS at 12:46

## 2017-06-27 RX ADMIN — THERA TABS 1 TABLET: TAB at 09:24

## 2017-06-27 RX ADMIN — OMEPRAZOLE 40 MG: 20 CAPSULE, DELAYED RELEASE ORAL at 20:01

## 2017-06-27 RX ADMIN — POTASSIUM CHLORIDE 40 MEQ: 1.5 POWDER, FOR SOLUTION ORAL at 09:26

## 2017-06-27 RX ADMIN — FENTANYL CITRATE 100 MCG: 50 INJECTION, SOLUTION INTRAMUSCULAR; INTRAVENOUS at 16:53

## 2017-06-27 RX ADMIN — KETOROLAC TROMETHAMINE 1 DROP: 4 SOLUTION/ DROPS OPHTHALMIC at 09:28

## 2017-06-27 RX ADMIN — IPRATROPIUM BROMIDE AND ALBUTEROL SULFATE 3 ML: .5; 3 SOLUTION RESPIRATORY (INHALATION) at 22:38

## 2017-06-27 RX ADMIN — PIPERACILLIN SODIUM AND TAZOBACTAM SODIUM 3.38 G: 3; .375 INJECTION, POWDER, FOR SOLUTION INTRAVENOUS at 03:25

## 2017-06-27 RX ADMIN — OMEPRAZOLE 40 MG: 20 CAPSULE, DELAYED RELEASE ORAL at 09:24

## 2017-06-27 RX ADMIN — SODIUM CHLORIDE: 9 INJECTION, SOLUTION INTRAVENOUS at 02:39

## 2017-06-27 RX ADMIN — IPRATROPIUM BROMIDE AND ALBUTEROL SULFATE 3 ML: .5; 3 SOLUTION RESPIRATORY (INHALATION) at 06:58

## 2017-06-27 RX ADMIN — Medication 100 MG: at 09:24

## 2017-06-27 RX ADMIN — PROPOFOL 20 MCG/KG/MIN: 10 INJECTION, EMULSION INTRAVENOUS at 02:39

## 2017-06-27 RX ADMIN — PIPERACILLIN SODIUM AND TAZOBACTAM SODIUM 3.38 G: 3; .375 INJECTION, POWDER, FOR SOLUTION INTRAVENOUS at 15:45

## 2017-06-27 RX ADMIN — IPRATROPIUM BROMIDE AND ALBUTEROL SULFATE 3 ML: .5; 3 SOLUTION RESPIRATORY (INHALATION) at 18:30

## 2017-06-27 RX ADMIN — OMEPRAZOLE 40 MG: 20 CAPSULE, DELAYED RELEASE ORAL at 00:43

## 2017-06-27 RX ADMIN — POTASSIUM CHLORIDE 10 MEQ: 7.46 INJECTION, SOLUTION INTRAVENOUS at 18:40

## 2017-06-27 RX ADMIN — FOLIC ACID 1 MG: 1 TABLET ORAL at 09:24

## 2017-06-27 RX ADMIN — FUROSEMIDE 40 MG: 10 INJECTION, SOLUTION INTRAMUSCULAR; INTRAVENOUS at 15:45

## 2017-06-27 RX ADMIN — PROPOFOL 35 MCG/KG/MIN: 10 INJECTION, EMULSION INTRAVENOUS at 18:40

## 2017-06-27 RX ADMIN — PIPERACILLIN SODIUM AND TAZOBACTAM SODIUM 3.38 G: 3; .375 INJECTION, POWDER, FOR SOLUTION INTRAVENOUS at 20:00

## 2017-06-27 RX ADMIN — KETOROLAC TROMETHAMINE 1 DROP: 4 SOLUTION/ DROPS OPHTHALMIC at 20:01

## 2017-06-27 RX ADMIN — PIPERACILLIN SODIUM AND TAZOBACTAM SODIUM 3.38 G: 3; .375 INJECTION, POWDER, FOR SOLUTION INTRAVENOUS at 09:17

## 2017-06-27 RX ADMIN — IPRATROPIUM BROMIDE AND ALBUTEROL SULFATE 3 ML: .5; 3 SOLUTION RESPIRATORY (INHALATION) at 02:25

## 2017-06-27 ASSESSMENT — LIFESTYLE VARIABLES: DO YOU DRINK ALCOHOL: YES

## 2017-06-27 NOTE — DOCUMENTATION QUERY
"DOCUMENTATION QUERY    PROVIDERS: Please select “Cosign w/ note”to reply to query.    To better represent the severity of illness of your patient, please review the following information and exercise your independent professional judgment in responding to this query.     Septic Shock is documented in the History and Physical. Per subsequent progress notes there is no further mention of sepsis or septic shock until the Procedure Note dated 6/25/17 where it is documented:  \"RIJ Central Line Placement for Septic Shock\".  Based upon the clinical findings, risk factors, and treatment, please specify if this condition was present on admission or developed after admission, or ruled out.     Please select all that apply:   • Sepsis with Septic shock, present on admission:  Please specify causative organism and any associated organ dysfunction if known  • Sepsis with septic shock developed after admission  • Sepsis without septic shock ruled in  • Sepsis ruled out  • Other explanation of clinical findings  • Findings of no clinical significance  • Unable to determine      The medical record reflects the following:   Clinical Findings Heart rate on admit:  97  Respiratory rate on admit: 27  T max 103.6  WBCs on admit:  20.8  Respiratory cultures positive for: Pseudomonas and Klebsiella  Acute respiratory failure with hypoxia on admit   Severe metabolic acidosis on admit with Lactic Acid range: 2.2-3.6  Procalcitonin range 0.33-2.18    Treatment Labs  Bronchoscopy with respiratory cultures  RIJ central line placement   Chest xray  Intubation with mechanical ventilation  NS ivf with NS ivf bolus  IV Zosyn  IV Unasyn  RT protocol  Duoneb treatments   Tylenol  Cooling blanket    Risk Factors Age  Alcohol abuse with acute intoxication  Aspiration pneumonia  Diabetes  Facial trauma status post ground level fall with aspiration of blood    Location within medical record  History and Physical, Lab Results  and Procedure Note  "     Thank you,   Gabbi Wolf RN  Clinical   [phone]

## 2017-06-27 NOTE — CARE PLAN
Problem: Infection  Goal: Will remain free from infection  Outcome: PROGRESSING SLOWER THAN EXPECTED  Pt continues to spike temps, tylenol dose given prn. Monitoring temp continuously, will administer as needed. WBCs wdl. Pt receiving scheduled IV antibiotics, will continue to give per MD order.     Problem: Fluid Volume:  Goal: Will maintain balanced intake and output  Outcome: PROGRESSING SLOWER THAN EXPECTED  Per charting, pt +15L since admission. No evidence of profound edema noted. MD plans to diurese today, will continue to monitor I/Os closely.

## 2017-06-27 NOTE — DISCHARGE PLANNING
78 y/o male.  [facility] resident.  ETOH-GLF.   Now intubated.   Has spouse.      Follow for extubation.

## 2017-06-27 NOTE — CARE PLAN
Problem: Bowel/Gastric:  Goal: Normal bowel function is maintained or improved  Outcome: PROGRESSING AS EXPECTED  Patient had one small BM throughout the night.   Intervention: Educate patient and significant other/support system about diet, fluid intake, medications and activity to promote bowel function  Continual assessment. Patient's bowel sounds have improved. Normoactive BM in all four quadrants.   Intervention: Educate patient and significant other/support system about signs and symptoms of constipation and interventions to implement  Continue to support family.   Intervention: Collaborate with Interdisciplinary Team for optimal positioning for bowel evacuation  Done. Daily rounding and RN reports given.

## 2017-06-27 NOTE — CARE PLAN
Problem: Bronchoconstriction:  Goal: Improve in air movement and diminished wheezing  Intervention: Implement inhaled treatments  Duoneb Q4 hours      Problem: Ventilation Defect:  Goal: Ability to achieve and maintain unassisted ventilation or tolerate decreased levels of ventilator support  Intervention: Support and monitor invasive and noninvasive mechanical ventilation  Adult Ventilation Update    Total Vent Days: 5      Patient Lines/Drains/Airways Status    Active Airway      Name: Placement date: Placement time: Site: Days:     Airway Group ET Tube 8.0 06/24/17  1845    4 (re-intubation)                 #FVC / Vital Capacity (liters) :  (not following) (06/26/17 0808)  NIF (cm H2O) :  (not following) (06/26/17 0808)  Rapid Shallow Breathing Index (RR/VT): 75 (06/26/17 1620)  Plateau Pressure (Q Shift): 20 (06/27/17 0226)  Static Compliance (ml / cm H2O): 51.9 (06/27/17 0226)    Patient failed trials because of Barriers to Wean: Other (Comments) (06/24/17 0700)  Barriers to SBT Weaning Trial Stopped due to:: Pt weaned for 1 hour and returned to rest settings per protocol (06/26/17 1620)  Length of Weaning Trial Length of Weaning Trial (Hours): 1 (06/26/17 1620)

## 2017-06-27 NOTE — FLOWSHEET NOTE
Weaning parameters pt didn't follow for parameters     06/27/17 1610   Events/Summary/Plan   Events/Summary/Plan weaning parameters   Weaning Parameters   RR (bpm) 26   #FVC / Vital Capacity (liters)  (.736 with cough.)   NIF (cm H2O)  (-17)   Rapid Shallow Breathing Index (RR/VT) 67   Spontaneous VE 8.9   Spontaneous

## 2017-06-27 NOTE — PROGRESS NOTES
Pulmonary Critical Care Progress Note        Date of Service: 6/27/2017      Chief Complaint: GLF and ETOH    History of Present Illness:   This is a 77-year-old male admitted to the ICU through the ED on 6/23/17 for acute alcohol intoxication and ground level fall. Patient intubated in the ED.     ROS:  Respiratory: unable to perform due to the patient's inability to effectively communicate, Cardiac: unable to perform due to the patient's inability to effectively communicate, GI: unable to perform due to the patient's inability to effectively communicate.  All other systems negative.    Interval Events:  24 hour interval history reviewed   Not following, Tries to opens eyes with some difficult due to swelling Prop 20  SR with PVCs, -130 systolic  TF at 45 which is goal  Positive occult blood yesterday, started on PPI   500 cc urine output overnight,   T max 101.3  Vent day 5, SBT Nif -17 does not follow for parameters  CXR pulmonary edema bilaterally right greater than left  Day 3 Zosyn for pneumonia for pseudomonas and Klebsiella  Mag replaced per protocol    Plan:  Lasix 40 mg BID  K scale    PFSH:  No change.    Physical Exam:  General:  Resting comfortably on phenobarbital. Responds to verbal stimuli  HEENT:  Pupils 3 mm and reactive to light, Well healing laceration underneath right eye, periorbital swelling, no scleral edema, conjunctiva pink, sclera anicteric  CVS:  RRR, no murmur.  Respiratory:  Clear to auscultation.  Abdomen:  Soft, NT.  Extremities:  2+ dpp, trace pedal edema   Skin: Warm dry, capillary refill intact  Neuro/Psych: Responds to verbal stimuli , Not withdrawing to pain at this time    Respiratory:  Carbajal Vent Mode: APVCMV, Rate (breaths/min): 20, Vt Target (mL): 460, PEEP/CPAP: 8, FiO2: 30, Static Compliance (ml / cm H2O): 51.9, Control VTE (exp VT): 465  Pulse Oximetry: 98 %  ImagingAvailable data reviewed   Recent Labs      06/24/17 1952 06/25/17 0432 06/26/17 0431    ISTATAPH  7.398*  7.439  7.406   ISTATAPCO2  39.6*  33.8  35.7   ISTATAPO2  104*  68  97*   ISTATATCO2  26  24  23   [id number]  98  94  98   ISTATARTHCO3  24.5  22.9  22.4   ISTATARTBE  0  -1  -2   ISTATTEMP  39.0 C  37.0 C  97.9 F   ISTATFIO2  70  30  30   ISTATSPEC  Arterial  Arterial  Arterial   ISTATAPHTC  7.369*  7.439  7.411   [id number]  117*  68  95*     HemoDynamics:  Pulse: 76, Heart Rate (Monitored): 76  NIBP: 119/59 mmHg     Imaging: Available data reviewed   Maintenance fluid 100     Neuro:  GCS Total Donny Coma Score: 7  Imaging: Available data reviewed   Propofol 20     Fluids:  Intake/Output       06/25/17 0700 - 06/26/17 0659 06/26/17 0700 - 06/27/17 0659 06/27/17 0700 - 06/28/17 0659      2805-3720 4302-3607 Total 3638-4945 2041-9294 Total 4782-0266 9664-5767 Total       Intake    I.V.  2001.3  1332.1 3333.4  1320  1100 2420  --  -- --    Propofol Volume 251.3 132.1 383.4 120 100 220 -- -- --    IV Volume (Normal saline) 1200 1200 2400 1200 1000 2200 -- -- --    IV Volume (IVPB) 550 -- 550 -- -- -- -- -- --    Blood  280  -- 280  --  -- --  --  -- --    Volume (RELEASE RED BLOOD CELLS) 280 -- 280 -- -- -- -- -- --    Other  60  90 150  150  60 210  --  -- --    Medications (P.O./ Enteral Liquids) 60 90 150 150 60 210 -- -- --    Enteral  370  600 970  660  610 1270  --  -- --    Enteral Volume 280 420 700 540 450 990 -- -- --    Free Water / Tube Flush 90 180 270 120 160 280 -- -- --    Total Intake 2711.3 2022.1 4733.4 2130 1770 3900 -- -- --       Output    Urine  600  696 1296  575  430 1005  --  -- --    Indwelling Cathether  575 -- 575 -- -- --    Void (ml) -- -- -- -- 430 430 -- -- --    Drains  --  0 0  --  0 0  --  -- --    Residual Amount (ml) (Discarded) -- 0 0 -- 0 0 -- -- --    Stool  --  -- --  --  -- --  --  -- --    Number of Times Stooled -- 1 x 1 x 0 x 1 x 1 x -- -- --    Total Output   -- -- --       Net I/O     2111.3 1326.1 3437.4 155  1340 2895 -- -- --           Recent Labs      17   0512  17   0520   SODIUM  143  140   POTASSIUM  3.1*  3.4*   CHLORIDE  118*  111   CO2  17*  24   BUN  9  20   CREATININE  0.43*  0.55   MAGNESIUM  1.8  2.1   PHOSPHORUS  2.3*  2.9   CALCIUM  6.4*  7.9*     GI/Nutrition:  Imaging: Available data reviewed  NPO     Liver Function  Recent Labs      17   0512  17   0520   ALTSGPT   --   18   ASTSGOT   --   24   ALKPHOSPHAT   --   142*   TBILIRUBIN   --   0.8   PREALBUMIN   --   9.0*   GLUCOSE  97  125*     Heme:  Recent Labs      17   0512  17   1207  17   0520   RBC  2.06*  2.97*  2.33*   HEMOGLOBIN  6.8*  9.8*  7.7*   HEMATOCRIT  20.6*  29.3*  22.8*   PLATELETCT  95*  130*  106*     Infectious Disease:  Temp  Av.6 °C (99.7 °F)  Min: 36.9 °C (98.5 °F)  Max: 38 °C (100.4 °F)  Micro: reviewed   Recent Labs      17   0512  17   1207  17   0520   WBC  7.0  7.2  8.3   NEUTSPOLYS  85.50*  93.20*  92.70*   LYMPHOCYTES  7.70*  3.20*  5.50*   MONOCYTES  6.00  2.80  1.80   EOSINOPHILS  0.40  0.10  0.00   BASOPHILS  0.10  0.10  0.00   ASTSGOT   --    --   24   ALTSGPT   --    --   18   ALKPHOSPHAT   --    --   142*   TBILIRUBIN   --    --   0.8     Current Facility-Administered Medications   Medication Dose Frequency Provider Last Rate Last Dose   • omeprazole 2 mg/mL in sodium bicarbonate (PRILOSEC) oral susp 40 mg  40 mg Q12HRS JENIFER Billings.O.   40 mg at 17 0043   • piperacillin-tazobactam (ZOSYN) 3.375 g in  mL IVPB  3.375 g Q6HRS FRANK BillingsO.   Stopped at 17 0359   • oxycodone immediate-release (ROXICODONE) tablet 5 mg  5 mg Q4HRS PRN Gunnar Bryan M.D.       • oxycodone immediate release (ROXICODONE) tablet 10 mg  10 mg Q4HRS PRN Gunnar Bryan M.D.       • lorazepam (ATIVAN) injection 1-2 mg  1-2 mg Q4HRS PRN Gunnar Bryan M.D.   1 mg at 17 1248   • KETOROLAC TROMETHAMINE (OPHTH) 0.4 % SOLN 1 Drop  1 Drop TWICE DAILY  Gunnar Bryan M.D.   1 Drop at 06/26/17 2006   • Respiratory Care per Protocol   Continuous RT Gunnar Bryan M.D.       • MD ALERT...Adult ICU Electrolyte Replacement per Pharmacy Protocol   pharmacy to dose Gunnar Bryan M.D.       • fentaNYL (SUBLIMAZE) injection 25 mcg  25 mcg Q HOUR PRN Gunnar Bryan M.D.        Or   • fentaNYL (SUBLIMAZE) injection 50 mcg  50 mcg Q HOUR PRN Gunnar Bryan M.D.        Or   • fentaNYL (SUBLIMAZE) injection 100 mcg  100 mcg Q HOUR PRN Gunnar Bryan M.D.       • ipratropium-albuterol (DUONEB) nebulizer solution 3 mL  3 mL Q2HRS PRN (RT) Gunnar Bryan M.D.       • ipratropium-albuterol (DUONEB) nebulizer solution 3 mL  3 mL Q4HRS (RT) Gunnar Bryan M.D.   3 mL at 06/27/17 0225   • acetaminophen (TYLENOL) tablet 650 mg  650 mg Q4HRS PRN Pedro Vivas M.D.   650 mg at 06/25/17 2016   • acetaminophen (TYLENOL) suppository 650 mg  650 mg Q6HRS PRN Pedro Vivas M.D.   650 mg at 06/24/17 1955   • NS infusion   Continuous Tunde Panchal M.D. 100 mL/hr at 06/27/17 0239     • glucose 4 g chewable tablet 16 g  16 g Q15 MIN PRN Kaya Gonzalez, PHARMD       • dextrose 50% (D50W) injection 25 mL  25 mL Q15 MIN PRN KENDALL KoD       • thiamine (THIAMINE) tablet 100 mg  100 mg DAILY Tunde Panchal M.D.   100 mg at 06/26/17 0849   • folic acid (FOLVITE) tablet 1 mg  1 mg DAILY Tunde Panchal M.D.   1 mg at 06/26/17 0849   • multivitamin (THERAGRAN) tablet 1 Tab  1 Tab DAILY Pedro Vivas M.D.   1 Tab at 06/26/17 0849   • senna-docusate (PERICOLACE or SENOKOT S) 8.6-50 MG per tablet 2 Tab  2 Tab BID Pedro Vivas M.D.   Stopped at 06/26/17 2100    And   • polyethylene glycol/lytes (MIRALAX) PACKET 1 Packet  1 Packet QDAY PRN Pedro Vivas M.D.        And   • magnesium hydroxide (MILK OF MAGNESIA) suspension 30 mL  30 mL QDAY PRN Pedro Vivas M.D.        And   • bisacodyl (DULCOLAX) suppository 10 mg  10 mg QDAY  PRN Pedro Vivas M.D.       • chlorhexidine (PERIDEX) 0.12 % solution 15 mL  15 mL BID Pedro Vivas M.D.   15 mL at 06/26/17 2006   • lidocaine (XYLOCAINE) 1%  injection  1-2 mL Q30 MIN PRN Pedro Vivas M.D.       • propofol (DIPRIVAN) injection  5-80 mcg/kg/min Continuous Pedro Vivas M.D. 10 mL/hr at 06/27/17 0239 20 mcg/kg/min at 06/27/17 0239     Last reviewed on 6/23/2017  7:57 PM by Ángel Bazan R.N.    Quality  Measures:  Labs reviewed, Medications reviewed and Radiology images reviewed  Herring catheter: Critically Ill - Requiring Accurate Measurement of Urinary Output  Central line in place: Need for access and Sepsis      DVT prophylaxis - mechanical: SCDs  Ulcer prophylaxis: Yes  Antibiotics: Treating active infection/contamination beyond 24 hours perioperative coverage        Assessment and Plan:  Ventilator-dependent respiratory failure.   - trial extubation 6/24   - increasing agitation and resp distress and re-intubated in several hours 6/24   - s/p bronch with therapeutic airway lavage on 6/24; sent for cx   - back on full vent support  Acute Hypoxic Respiratory Failure   - s/p bronch with BAL: Pseudomonas aeruginosa and Klebsiella pneumonia   - Started Zosyn   - noted 15L positive fluid balance-->start forced diuresis  Status post ground level fall with facial trauma   - 3 cm laceration to his lip, which was sutured in [place], [place]   - seen by trauma and cleared    - Trauma following; CT abd/pelvis neg today; follow  Status post aspiration.   - empiric abx; f/u cxr  Acute alcohol intoxication with blood alcohol level of 0.206 in [place] at the time of presentation.   - sedation; monitor for w/d   - follow/correct lytes   - folate, thiamine, and MVI  Acute blood loss anemia.   - follow HH  Thrombocytopenia   - following  Hyponatremia.   - improved  Presumed diabetes mellitus   - glycemic control    Discussed patient condition and risk of morbidity  and/or mortality with RN, RT, Pharmacy, Charge nurse / hot rounds and hospitalist.    The patient remains critically ill.  Critical care time = 33 minutes in directly providing and coordinating critical care and extensive data review.  No time overlap and excludes procedures.     Neva SOTOMAYOR (Scribe), am scribing for, and in the presence of, Melida Baeza M.D.    Electronically signed by: Neva Yeung (Katelinibe), 6/27/2017    Melida SOTOMAYOR M.D. personally performed the services described in this documentation, as scribed by Neva Yeung in my presence, and it is both accurate and complete.

## 2017-06-27 NOTE — CARE PLAN
Problem: Ventilation Defect:  Goal: Ability to achieve and maintain unassisted ventilation or tolerate decreased levels of ventilator support  Outcome: PROGRESSING AS EXPECTED  Adult Ventilation Update    Total Vent Days: 4      Patient Lines/Drains/Airways Status    Active Airway      Name: Placement date: Placement time: Site: Days:     Airway Group ET Tube 8.0 @ 25 06/24/17 1845    1               APVcmv  20  /  460  /  +8  /  30%  DUO q4    In the last 24 hours, the patient tolerated SBT for 2 hours on settings of 5 over 8.    #FVC / Vital Capacity (liters) :  (not following) (06/26/17 0808)  NIF (cm H2O) :  (not following) (06/26/17 0808)  Rapid Shallow Breathing Index (RR/VT): 75 (06/26/17 1620)  Plateau Pressure (Q Shift): 18 (06/26/17 1103)  Static Compliance (ml / cm H2O): 57 (06/26/17 1508)    Cough: Productive (06/26/17 1600)  Sputum Amount: Moderate (06/26/17 1600)  Sputum Color: White (06/26/17 1600)  Sputum Consistency: Thick (06/26/17 1600)    Events/Summary/Plan: passive parameters obtained and placed pt back on rest settings (06/26/17 1620)

## 2017-06-28 ENCOUNTER — APPOINTMENT (OUTPATIENT)
Dept: RADIOLOGY | Facility: MEDICAL CENTER | Age: 77
DRG: 163 | End: 2017-06-28
Attending: INTERNAL MEDICINE
Payer: MEDICARE

## 2017-06-28 LAB
ANION GAP SERPL CALC-SCNC: 6 MMOL/L (ref 0–11.9)
ANION GAP SERPL CALC-SCNC: 7 MMOL/L (ref 0–11.9)
ANION GAP SERPL CALC-SCNC: 7 MMOL/L (ref 0–11.9)
BASE EXCESS BLDA CALC-SCNC: 1 MMOL/L (ref -4–3)
BASOPHILS # BLD AUTO: 0.3 % (ref 0–1.8)
BASOPHILS # BLD: 0.02 K/UL (ref 0–0.12)
BODY TEMPERATURE: ABNORMAL DEGREES
BUN SERPL-MCNC: 20 MG/DL (ref 8–22)
BUN SERPL-MCNC: 21 MG/DL (ref 8–22)
BUN SERPL-MCNC: 22 MG/DL (ref 8–22)
CALCIUM SERPL-MCNC: 7.9 MG/DL (ref 8.5–10.5)
CALCIUM SERPL-MCNC: 8.2 MG/DL (ref 8.5–10.5)
CALCIUM SERPL-MCNC: 8.2 MG/DL (ref 8.5–10.5)
CHLORIDE SERPL-SCNC: 104 MMOL/L (ref 96–112)
CHLORIDE SERPL-SCNC: 105 MMOL/L (ref 96–112)
CHLORIDE SERPL-SCNC: 106 MMOL/L (ref 96–112)
CO2 BLDA-SCNC: 25 MMOL/L (ref 20–33)
CO2 SERPL-SCNC: 26 MMOL/L (ref 20–33)
CO2 SERPL-SCNC: 28 MMOL/L (ref 20–33)
CO2 SERPL-SCNC: 28 MMOL/L (ref 20–33)
CREAT SERPL-MCNC: 0.51 MG/DL (ref 0.5–1.4)
CREAT SERPL-MCNC: 0.52 MG/DL (ref 0.5–1.4)
CREAT SERPL-MCNC: 0.55 MG/DL (ref 0.5–1.4)
EOSINOPHIL # BLD AUTO: 0.07 K/UL (ref 0–0.51)
EOSINOPHIL NFR BLD: 0.9 % (ref 0–6.9)
ERYTHROCYTE [DISTWIDTH] IN BLOOD BY AUTOMATED COUNT: 51.8 FL (ref 35.9–50)
GFR SERPL CREATININE-BSD FRML MDRD: >60 ML/MIN/1.73 M 2
GLUCOSE SERPL-MCNC: 129 MG/DL (ref 65–99)
GLUCOSE SERPL-MCNC: 131 MG/DL (ref 65–99)
GLUCOSE SERPL-MCNC: 138 MG/DL (ref 65–99)
HCO3 BLDA-SCNC: 24.3 MMOL/L (ref 17–25)
HCT VFR BLD AUTO: 24.2 % (ref 42–52)
HGB BLD-MCNC: 8.2 G/DL (ref 14–18)
IMM GRANULOCYTES # BLD AUTO: 0.04 K/UL (ref 0–0.11)
IMM GRANULOCYTES NFR BLD AUTO: 0.5 % (ref 0–0.9)
LYMPHOCYTES # BLD AUTO: 0.46 K/UL (ref 1–4.8)
LYMPHOCYTES NFR BLD: 6.1 % (ref 22–41)
MCH RBC QN AUTO: 32.8 PG (ref 27–33)
MCHC RBC AUTO-ENTMCNC: 33.9 G/DL (ref 33.7–35.3)
MCV RBC AUTO: 96.8 FL (ref 81.4–97.8)
MONOCYTES # BLD AUTO: 0.42 K/UL (ref 0–0.85)
MONOCYTES NFR BLD AUTO: 5.6 % (ref 0–13.4)
NEUTROPHILS # BLD AUTO: 6.52 K/UL (ref 1.82–7.42)
NEUTROPHILS NFR BLD: 86.6 % (ref 44–72)
NRBC # BLD AUTO: 0 K/UL
NRBC BLD AUTO-RTO: 0 /100 WBC
O2/TOTAL GAS SETTING VFR VENT: 30 %
PCO2 BLDA: 32.8 MMHG (ref 26–37)
PCO2 TEMP ADJ BLDA: 33.5 MMHG (ref 26–37)
PH BLDA: 7.48 [PH] (ref 7.4–7.5)
PH TEMP ADJ BLDA: 7.47 [PH] (ref 7.4–7.5)
PLATELET # BLD AUTO: 127 K/UL (ref 164–446)
PMV BLD AUTO: 10.5 FL (ref 9–12.9)
PO2 BLDA: 94 MMHG (ref 64–87)
PO2 TEMP ADJ BLDA: 97 MMHG (ref 64–87)
POTASSIUM SERPL-SCNC: 3.5 MMOL/L (ref 3.6–5.5)
POTASSIUM SERPL-SCNC: 3.5 MMOL/L (ref 3.6–5.5)
POTASSIUM SERPL-SCNC: 3.9 MMOL/L (ref 3.6–5.5)
RBC # BLD AUTO: 2.5 M/UL (ref 4.7–6.1)
SAO2 % BLDA: 98 % (ref 93–99)
SODIUM SERPL-SCNC: 137 MMOL/L (ref 135–145)
SODIUM SERPL-SCNC: 139 MMOL/L (ref 135–145)
SODIUM SERPL-SCNC: 141 MMOL/L (ref 135–145)
SPECIMEN DRAWN FROM PATIENT: ABNORMAL
TRIGL SERPL-MCNC: 233 MG/DL (ref 0–149)
WBC # BLD AUTO: 7.5 K/UL (ref 4.8–10.8)

## 2017-06-28 PROCEDURE — 700105 HCHG RX REV CODE 258

## 2017-06-28 PROCEDURE — A9270 NON-COVERED ITEM OR SERVICE: HCPCS | Performed by: HOSPITALIST

## 2017-06-28 PROCEDURE — 700111 HCHG RX REV CODE 636 W/ 250 OVERRIDE (IP): Performed by: HOSPITALIST

## 2017-06-28 PROCEDURE — 700105 HCHG RX REV CODE 258: Performed by: HOSPITALIST

## 2017-06-28 PROCEDURE — 700101 HCHG RX REV CODE 250: Performed by: INTERNAL MEDICINE

## 2017-06-28 PROCEDURE — 99291 CRITICAL CARE FIRST HOUR: CPT | Performed by: INTERNAL MEDICINE

## 2017-06-28 PROCEDURE — 82803 BLOOD GASES ANY COMBINATION: CPT

## 2017-06-28 PROCEDURE — 700105 HCHG RX REV CODE 258: Performed by: INTERNAL MEDICINE

## 2017-06-28 PROCEDURE — 700111 HCHG RX REV CODE 636 W/ 250 OVERRIDE (IP): Performed by: INTERNAL MEDICINE

## 2017-06-28 PROCEDURE — 700102 HCHG RX REV CODE 250 W/ 637 OVERRIDE(OP): Performed by: HOSPITALIST

## 2017-06-28 PROCEDURE — 700102 HCHG RX REV CODE 250 W/ 637 OVERRIDE(OP): Performed by: INTERNAL MEDICINE

## 2017-06-28 PROCEDURE — 94640 AIRWAY INHALATION TREATMENT: CPT

## 2017-06-28 PROCEDURE — 84478 ASSAY OF TRIGLYCERIDES: CPT

## 2017-06-28 PROCEDURE — 770022 HCHG ROOM/CARE - ICU (200)

## 2017-06-28 PROCEDURE — 71010 DX-CHEST-PORTABLE (1 VIEW): CPT

## 2017-06-28 PROCEDURE — 80048 BASIC METABOLIC PNL TOTAL CA: CPT

## 2017-06-28 PROCEDURE — 36600 WITHDRAWAL OF ARTERIAL BLOOD: CPT

## 2017-06-28 PROCEDURE — 99232 SBSQ HOSP IP/OBS MODERATE 35: CPT | Performed by: HOSPITALIST

## 2017-06-28 PROCEDURE — A9270 NON-COVERED ITEM OR SERVICE: HCPCS | Performed by: INTERNAL MEDICINE

## 2017-06-28 PROCEDURE — 94003 VENT MGMT INPAT SUBQ DAY: CPT

## 2017-06-28 PROCEDURE — 85025 COMPLETE CBC W/AUTO DIFF WBC: CPT

## 2017-06-28 RX ORDER — POTASSIUM CHLORIDE 14.9 MG/ML
20 INJECTION INTRAVENOUS ONCE
Status: COMPLETED | OUTPATIENT
Start: 2017-06-28 | End: 2017-06-28

## 2017-06-28 RX ORDER — SODIUM CHLORIDE 9 MG/ML
INJECTION, SOLUTION INTRAVENOUS
Status: COMPLETED
Start: 2017-06-28 | End: 2017-06-28

## 2017-06-28 RX ORDER — POTASSIUM CHLORIDE 7.45 MG/ML
10 INJECTION INTRAVENOUS ONCE
Status: COMPLETED | OUTPATIENT
Start: 2017-06-28 | End: 2017-06-28

## 2017-06-28 RX ADMIN — POTASSIUM CHLORIDE 20 MEQ: 14.9 INJECTION, SOLUTION INTRAVENOUS at 13:22

## 2017-06-28 RX ADMIN — KETOROLAC TROMETHAMINE 1 DROP: 4 SOLUTION/ DROPS OPHTHALMIC at 08:06

## 2017-06-28 RX ADMIN — FENTANYL CITRATE 50 MCG/HR: 50 INJECTION, SOLUTION INTRAMUSCULAR; INTRAVENOUS at 10:46

## 2017-06-28 RX ADMIN — IPRATROPIUM BROMIDE AND ALBUTEROL SULFATE 3 ML: .5; 3 SOLUTION RESPIRATORY (INHALATION) at 02:15

## 2017-06-28 RX ADMIN — OMEPRAZOLE 40 MG: 20 CAPSULE, DELAYED RELEASE ORAL at 08:04

## 2017-06-28 RX ADMIN — PROPOFOL 20 MCG/KG/MIN: 10 INJECTION, EMULSION INTRAVENOUS at 08:10

## 2017-06-28 RX ADMIN — CHLORHEXIDINE GLUCONATE 15 ML: 1.2 RINSE ORAL at 08:04

## 2017-06-28 RX ADMIN — FOLIC ACID 1 MG: 1 TABLET ORAL at 08:04

## 2017-06-28 RX ADMIN — FUROSEMIDE 40 MG: 10 INJECTION, SOLUTION INTRAMUSCULAR; INTRAVENOUS at 06:18

## 2017-06-28 RX ADMIN — KETOROLAC TROMETHAMINE 1 DROP: 4 SOLUTION/ DROPS OPHTHALMIC at 20:30

## 2017-06-28 RX ADMIN — THERA TABS 1 TABLET: TAB at 08:04

## 2017-06-28 RX ADMIN — OMEPRAZOLE 40 MG: 20 CAPSULE, DELAYED RELEASE ORAL at 20:31

## 2017-06-28 RX ADMIN — IPRATROPIUM BROMIDE AND ALBUTEROL SULFATE 3 ML: .5; 3 SOLUTION RESPIRATORY (INHALATION) at 22:29

## 2017-06-28 RX ADMIN — IPRATROPIUM BROMIDE AND ALBUTEROL SULFATE 3 ML: .5; 3 SOLUTION RESPIRATORY (INHALATION) at 06:22

## 2017-06-28 RX ADMIN — CHLORHEXIDINE GLUCONATE 15 ML: 1.2 RINSE ORAL at 20:31

## 2017-06-28 RX ADMIN — Medication 100 MG: at 08:04

## 2017-06-28 RX ADMIN — PIPERACILLIN SODIUM AND TAZOBACTAM SODIUM 3.38 G: 3; .375 INJECTION, POWDER, FOR SOLUTION INTRAVENOUS at 15:22

## 2017-06-28 RX ADMIN — SODIUM CHLORIDE 500 ML: 9 INJECTION, SOLUTION INTRAVENOUS at 06:40

## 2017-06-28 RX ADMIN — POTASSIUM CHLORIDE 20 MEQ: 14.9 INJECTION, SOLUTION INTRAVENOUS at 18:06

## 2017-06-28 RX ADMIN — PIPERACILLIN SODIUM AND TAZOBACTAM SODIUM 3.38 G: 3; .375 INJECTION, POWDER, FOR SOLUTION INTRAVENOUS at 20:30

## 2017-06-28 RX ADMIN — POTASSIUM CHLORIDE 10 MEQ: 7.46 INJECTION, SOLUTION INTRAVENOUS at 06:25

## 2017-06-28 RX ADMIN — PIPERACILLIN SODIUM AND TAZOBACTAM SODIUM 3.38 G: 3; .375 INJECTION, POWDER, FOR SOLUTION INTRAVENOUS at 08:06

## 2017-06-28 RX ADMIN — POTASSIUM CHLORIDE 20 MEQ: 14.9 INJECTION, SOLUTION INTRAVENOUS at 02:10

## 2017-06-28 RX ADMIN — PIPERACILLIN SODIUM AND TAZOBACTAM SODIUM 3.38 G: 3; .375 INJECTION, POWDER, FOR SOLUTION INTRAVENOUS at 03:50

## 2017-06-28 RX ADMIN — PROPOFOL 35 MCG/KG/MIN: 10 INJECTION, EMULSION INTRAVENOUS at 00:23

## 2017-06-28 RX ADMIN — PROPOFOL 20 MCG/KG/MIN: 10 INJECTION, EMULSION INTRAVENOUS at 17:45

## 2017-06-28 RX ADMIN — IPRATROPIUM BROMIDE AND ALBUTEROL SULFATE 3 ML: .5; 3 SOLUTION RESPIRATORY (INHALATION) at 19:05

## 2017-06-28 RX ADMIN — IPRATROPIUM BROMIDE AND ALBUTEROL SULFATE 3 ML: .5; 3 SOLUTION RESPIRATORY (INHALATION) at 15:21

## 2017-06-28 RX ADMIN — IPRATROPIUM BROMIDE AND ALBUTEROL SULFATE 3 ML: .5; 3 SOLUTION RESPIRATORY (INHALATION) at 11:21

## 2017-06-28 RX ADMIN — FUROSEMIDE 40 MG: 10 INJECTION, SOLUTION INTRAMUSCULAR; INTRAVENOUS at 15:23

## 2017-06-28 NOTE — PROGRESS NOTES
Pt mobilized around 1615, sat edge of bed for 30sec. Returned to bed and sheets changed. Pt tolerated activity well. Shortly thereafter, pt became tachycardic and hypertensive with some blood noted in mouth. Pt biting tube, compressing bite block. 100 mcg fentanyl given IVP and propofol increased, Dr Baeza notified. Order received for additional 100mcg fentanyl IVP. Pt HR and BP slowly decreasing, will continue to monitor.

## 2017-06-28 NOTE — CARE PLAN
Problem: Ventilation Defect:  Goal: Ability to achieve and maintain unassisted ventilation or tolerate decreased levels of ventilator support  Outcome: PROGRESSING SLOWER THAN EXPECTED  Adult Ventilation Update    Total Vent Days: 5      Patient Lines/Drains/Airways Status    Active Airway      Name: Placement date: Placement time: Site: Days:     Airway Group ET Tube 8.0 06/24/17 1845    4                 In the last 24 hours, the patient tolerated SBT for 5/8 on settings of 4 hours.    #FVC / Vital Capacity (liters) :  (.736 with cough.) (06/27/17 1610)  NIF (cm H2O) :  (-17) (06/27/17 1610)  Rapid Shallow Breathing Index (RR/VT): 67 (06/27/17 1610)  Plateau Pressure (Q Shift): 16 (06/27/17 1154)  Static Compliance (ml / cm H2O): 50.5 (06/27/17 1453)    Mobility Group  Pt Calls for Assistance: No (06/27/17 0800)      Events/Summary/Plan:  Pt stable on vent settings no changes, pt tolerates SBT's but doesn't follow commands. Pt mobilized with RN, CNA staff, post mobilization RN noticed pt had Et-tube with bite guard lodge in front teeth, and pt not following commands to open mouth to allow for repositioning. Will continue with daily SBT's

## 2017-06-28 NOTE — CARE PLAN
Problem: Pain Management  Goal: Pain level will decrease to patient’s comfort goal  Outcome: PROGRESSING SLOWER THAN EXPECTED  Collaborate with MDs in the AM to address pain management and propofol titration.   Intervention: Follow pain managment plan developed in collaboration with patient and Interdisciplinary Team  Patient's cpot score is low but may need more pain control support for sedation vacation and vent weaning.      Problem: Skin Integrity  Goal: Risk for impaired skin integrity will decrease  Outcome: PROGRESSING AS EXPECTED  Intervention: Assess and monitor skin integrity, appearance and/or temperature  Skin check q shift or PRN, q1-2 hours checking for device associated  Pressure ulcers, heel float boots, nutrition and daily Juan score assessment.

## 2017-06-28 NOTE — PROGRESS NOTES
Assumed care of patient at 1900. Received bedside report from CARRIE Ramos. Patient comfortable in bed. See restraint charting. Monitor alarms ranges verified. Bed alarm set. Drips verified. Patient intubated and sedated. Will continue to closely monitor.

## 2017-06-28 NOTE — FLOWSHEET NOTE
06/28/17 1010   Events/Summary/Plan   Events/Summary/Plan Returned to [facility]   General Vent Information   Pulse Oximetry 98 %   Heart Rate (Monitored) 69   Carbajal Vent   Carbajal Vent Mode APVCMV   Fields Conventional Settings   Rate (breaths/min) 20   Vt Target (mL) 460   TI (Seconds) 1   PEEP/CPAP 8   Pramp 50   FiO2 30   Sensitivity Setting Flow Trigger   Other Settings 5   Weaning Parameters   RR (bpm) 26   #FVC / Vital Capacity (liters)  (doesn't follow commands)   NIF (cm H2O)  (poor parameters.)   Rapid Shallow Breathing Index (RR/VT) 88   Spontaneous    Weaning Trial   Weaning Trial Stopped due to: RSBI >105 (Rapid Shallow Breathing Index)   Length of Weaning Trial (Hours) 3.5 hours   Vent Weaning Smart Text completed? Yes

## 2017-06-28 NOTE — PROGRESS NOTES
Pt temp increased to 39.5 this evening, cooling blanket applied. Tylenol dose already given. Will continue to monitor and notify MD. Dr Baeza aware. No new orders received at this time.

## 2017-06-28 NOTE — PROGRESS NOTES
Pulmonary Critical Care Progress Note        Date of Service: 6/28/2017      Chief Complaint: GLF and ETOH    History of Present Illness:   This is a 77-year-old male admitted to the ICU through the ED on 6/23/17 for acute alcohol intoxication and ground level fall. Patient intubated in the ED.     ROS:  Respiratory: unable to perform due to the patient's inability to effectively communicate, Cardiac: unable to perform due to the patient's inability to effectively communicate, GI: unable to perform due to the patient's inability to effectively communicate.  All other systems negative.    Interval Events:  24 hour interval history reviewed   Withdrawals to pain, not moving extremities purposefully, difficulty opening right eye due to swelling.  SR/ST, BP  systolic. MAPs about 60.   Sticky BM overnight with positive occult blood.   Tmax 101.1-98 °F - receiving cooling blanket.  Attempted to mobilize yesterday, but did not tolerate due to pain.   SBT 3.5hr this AM - RSBI 98, did not obtain other parameters.   Day 4 Zosyn   CXR with slightly improved bilateral pulmonary edema.    Plan:  Wean off propofol   Fentanyl gtt  Oxy PRN  Resume Zosyn for 10 days.    PFSH:  No change.    Physical Exam:  General:  Sleepy, not following any commands or doing any purposeful movements.   HEENT:  Significant periorbital swelling on right. Pupils 3mm and reactive to light bilaterally.   CVS:  RRR.  Respiratory:  Clear throughout.   Abdomen:  Soft, non-tender.   Extremities:  2+ DPP, moves all extremities, withdrawals all extremities.   Neuro/Psych: Symmetrical facial expressions. Grimaces with movement.      Respiratory:  Carbajal Vent Mode: APVCMV, Rate (breaths/min): 20, Vt Target (mL): 460, PEEP/CPAP: 8, FiO2: 30, Static Compliance (ml / cm H2O): 54, Control VTE (exp VT): 462  Pulse Oximetry: 99 %  ImagingAvailable data reviewed   Recent Labs      06/26/17   0431  06/27/17   0405   ISTATAPH  7.406  7.452   ISTATAPCO2  35.7   33.9   ISTATAPO2  97*  71   ISTATATCO2  23  25   [id number]  98  95   ISTATARTHCO3  22.4  23.7   ISTATARTBE  -2  0   ISTATTEMP  97.9 F  37.5 C   ISTATFIO2  30  30   ISTATSPEC  Arterial  Arterial   ISTATAPHTC  7.411  7.445   [id number]  95*  73     HemoDynamics:  Pulse: 70, Heart Rate (Monitored): 70  NIBP: (!) 99/45 mmHg     Imaging: Available data reviewed   Maintenance fluid 10    Neuro:  GCS Total Lerona Coma Score: 6  Imaging: Available data reviewed   Propofol 20     Fluids:  Intake/Output       06/26/17 0700 - 06/27/17 0659 06/27/17 0700 - 06/28/17 0659 06/28/17 0700 - 06/29/17 0659      6526-7508 3422-4660 Total 3208-6700 7844-8384 Total 1109-8310 3562-3685 Total       Intake    I.V.  1320  1320 2640  1470  253 1723  --  -- --    K+ Scale -- -- -- 200 100 300 -- -- --    Propofol Volume 120 120 240 120 153 273 -- -- --    IV Volume (Normal saline) 1200 1200 2400 900 -- 900 -- -- --    IV Volume (IVPB) -- -- -- 250 -- 250 -- -- --    Other  150  60 210  230  90 320  --  -- --    Medications (P.O./ Enteral Liquids) 150 60 210 230 90 320 -- -- --    Enteral  660  700 1360  900  630 1530  --  -- --    Enteral Volume  540 450 990 -- -- --    Free Water / Tube Flush 120 160 280 360 180 540 -- -- --    Total Intake 2130 2080 4210 2600 973 3573 -- -- --       Output    Urine  575  890 1465  3150  535 3685  --  -- --    Indwelling Cathether 575 -- 575 3150 535 3685 -- -- --    Void (ml) -- 890 890 -- -- -- -- -- --    Drains  --  0 0  0  0 0  --  -- --    Residual Amount (ml) (Discarded) -- 0 0 0 0 0 -- -- --    Stool  --  -- --  --  -- --  --  -- --    Number of Times Stooled 0 x 1 x 1 x 1 x 1 x 2 x -- -- --    Total Output  3150 535 3680 -- -- --       Net I/O     1555 1190 2745 -550 438 -112 -- -- --           Recent Labs      06/26/17   0520  06/27/17   0523  06/27/17   1205  06/27/17   1715  06/27/17   2310   SODIUM  140  138   --    --    --    POTASSIUM  3.4*  3.6  4.1  4.0  3.7    CHLORIDE  111  108   --    --    --    CO2  24  25   --    --    --    BUN  20  20   --    --    --    CREATININE  0.55  0.51   --    --    --    MAGNESIUM  2.1  1.8   --    --    --    PHOSPHORUS  2.9  3.0   --    --    --    CALCIUM  7.9*  7.8*   --    --    --      GI/Nutrition:  Imaging: Available data reviewed  NPO     Liver Function  Recent Labs      17   0520  17   0523   ALTSGPT  18   --    ASTSGOT  24   --    ALKPHOSPHAT  142*   --    TBILIRUBIN  0.8   --    PREALBUMIN  9.0*   --    GLUCOSE  125*  134*     Heme:  Recent Labs      17   0520  17   0523  17   0510   RBC  2.33*  2.44*  2.50*   HEMOGLOBIN  7.7*  7.9*  8.2*   HEMATOCRIT  22.8*  23.7*  24.2*   PLATELETCT  106*  116*  127*     Infectious Disease:  Temp  Av.8 °C (100.1 °F)  Min: 36.9 °C (98.4 °F)  Max: 39.5 °C (103.1 °F)  Micro: reviewed   Recent Labs      17   0520  17   0523  17   0510   WBC  8.3  6.0  7.5   NEUTSPOLYS  92.70*  87.60*  86.60*   LYMPHOCYTES  5.50*  3.50*  6.10*   MONOCYTES  1.80  6.20  5.60   EOSINOPHILS  0.00  2.70  0.90   BASOPHILS  0.00  0.00  0.30   ASTSGOT  24   --    --    ALTSGPT  18   --    --    ALKPHOSPHAT  142*   --    --    TBILIRUBIN  0.8   --    --      Current Facility-Administered Medications   Medication Dose Frequency Provider Last Rate Last Dose   • SODIUM CHLORIDE 0.9 % IV SOLN           • K+ Scale: Goal of 4.5  1 Each Q6HRS Melida Baeza M.D.   1 Each at 17 0000   • furosemide (LASIX) injection 40 mg  40 mg BID DIURETIC Melida Baeza M.D.   40 mg at 17 1545   • omeprazole 2 mg/mL in sodium bicarbonate (PRILOSEC) oral susp 40 mg  40 mg Q12HRS FRANK BillingsO.   40 mg at 17   • piperacillin-tazobactam (ZOSYN) 3.375 g in  mL IVPB  3.375 g Q6HRS JENIFER Billings.O.   Stopped at 170   • oxycodone immediate-release (ROXICODONE) tablet 5 mg  5 mg Q4HRS PRN Gunnar Bryan M.D.       • oxycodone immediate release  (ROXICODONE) tablet 10 mg  10 mg Q4HRS PRN Gunnar Bryan M.D.       • lorazepam (ATIVAN) injection 1-2 mg  1-2 mg Q4HRS PRN Gunnar Bryan M.D.   1 mg at 06/24/17 1248   • KETOROLAC TROMETHAMINE (OPHTH) 0.4 % SOLN 1 Drop  1 Drop TWICE DAILY Gunnar Bryan M.D.   1 Drop at 06/27/17 2001   • Respiratory Care per Protocol   Continuous RT Gunnar Bryan M.D.       • MD ALERT...Adult ICU Electrolyte Replacement per Pharmacy Protocol   pharmacy to dose Gunnar Bryan M.D.       • fentaNYL (SUBLIMAZE) injection 25 mcg  25 mcg Q HOUR PRN Gunnar Bryan M.D.        Or   • fentaNYL (SUBLIMAZE) injection 50 mcg  50 mcg Q HOUR PRN Gunnar Bryan M.D.        Or   • fentaNYL (SUBLIMAZE) injection 100 mcg  100 mcg Q HOUR PRN Gunnar Bryan M.D.   100 mcg at 06/27/17 1653   • ipratropium-albuterol (DUONEB) nebulizer solution 3 mL  3 mL Q2HRS PRN (RT) Gunnar Bryan M.D.       • ipratropium-albuterol (DUONEB) nebulizer solution 3 mL  3 mL Q4HRS (RT) Gunnar Bryan M.D.   3 mL at 06/28/17 0215   • acetaminophen (TYLENOL) tablet 650 mg  650 mg Q4HRS PRN Pedro Vivas M.D.   650 mg at 06/27/17 1543   • acetaminophen (TYLENOL) suppository 650 mg  650 mg Q6HRS PRN Pedro Vivas M.D.   650 mg at 06/24/17 1955   • glucose 4 g chewable tablet 16 g  16 g Q15 MIN PRN Kaya Gonzalez, KENDALLD       • dextrose 50% (D50W) injection 25 mL  25 mL Q15 MIN PRN Kaya Gonzalez, KENDALLD       • thiamine (THIAMINE) tablet 100 mg  100 mg DAILY Tunde Panchal M.D.   100 mg at 06/27/17 0924   • folic acid (FOLVITE) tablet 1 mg  1 mg DAILY Tunde Panchal M.D.   1 mg at 06/27/17 0924   • multivitamin (THERAGRAN) tablet 1 Tab  1 Tab DAILY Pedro Vivas M.D.   1 Tab at 06/27/17 0924   • senna-docusate (PERICOLACE or SENOKOT S) 8.6-50 MG per tablet 2 Tab  2 Tab BID Pedro Vivas M.D.   Stopped at 06/26/17 2100    And   • polyethylene glycol/lytes (MIRALAX) PACKET 1 Packet  1 Packet QDAY PRN Pedro Vivas  M.D.        And   • magnesium hydroxide (MILK OF MAGNESIA) suspension 30 mL  30 mL QDAY PRN Pedro Vivas M.D.        And   • bisacodyl (DULCOLAX) suppository 10 mg  10 mg QDAY PRN Pedro Vivas M.D.       • chlorhexidine (PERIDEX) 0.12 % solution 15 mL  15 mL BID Pedro Vivas M.D.   15 mL at 06/27/17 2001   • lidocaine (XYLOCAINE) 1%  injection  1-2 mL Q30 MIN PRN Pedro Vivas M.D.       • propofol (DIPRIVAN) injection  5-80 mcg/kg/min Continuous Pedro Vivas M.D. 15 mL/hr at 06/28/17 0100 30 mcg/kg/min at 06/28/17 0100     Last reviewed on 6/23/2017  7:57 PM by Ángel Bazan R.N.    Quality  Measures:  Labs reviewed, Medications reviewed and Radiology images reviewed  Herring catheter: Critically Ill - Requiring Accurate Measurement of Urinary Output  Central line in place: Need for access and Sepsis      DVT prophylaxis - mechanical: SCDs  Ulcer prophylaxis: Yes  Antibiotics: Treating active infection/contamination beyond 24 hours perioperative coverage        Assessment and Plan:  Ventilator-dependent respiratory failure.   - trial extubation 6/24   - increasing agitation and resp distress and re-intubated in several hours on 6/24   - s/p bronch with therapeutic airway lavage on 6/24; sent for cx   - back on full vent support  Acute Hypoxic Respiratory Failure   - s/p bronch with BAL: Pseudomonas aeruginosa and Klebsiella pneumonia   - Cont Zosyn for 10 days   - noted 15L positive fluid balance-->cont forced diuresis  Status post ground level fall with facial trauma   - 3 cm laceration to his lip, which was sutured in [place], [place]   - seen by trauma and cleared    - Trauma following; CT abd/pelvis neg today; follow   - start narcotics  Status post aspiration.   - empiric abx; f/u cxr  Acute alcohol intoxication with blood alcohol level of 0.206 in [place] at the time of presentation.   - sedation; monitor for w/d   - follow/correct lytes   - folate,  thiamine, and MVI  Acute blood loss anemia.   - follow HH  Thrombocytopenia   - following  Hyponatremia.   - improved  Presumed diabetes mellitus   - glycemic control    Discussed patient condition and risk of morbidity and/or mortality with RN, RT, Pharmacy, Charge nurse / hot rounds and hospitalist.    The patient remains critically ill.  Critical care time = 33 minutes in directly providing and coordinating critical care and extensive data review.  No time overlap and excludes procedures.    Maikel SOTOMAYOR (Alison), am scribing for, and in the presence of, Melida Baeza M.D.    Electronically signed by: Maikel Carrasco (Alison), 6/28/2017    Melida SOTOMAYOR M.D. personally performed the services described in this documentation, as scribed by Maikel Carrasco in my presence, and it is both accurate and complete.

## 2017-06-28 NOTE — PROGRESS NOTES
[facility] Hospitalist Progress Note    Date of Service: 2017    Chief Complaint  77 y.o. male admitted 2017 with alcohol abuse admitted after fall and intubated in ED.    Interval Problem Update  Intubate and sedated, unable to give interval history  Tries to open eyes, not following command, withdraws all ext to pain  In SR, blood pressure stable    Consultants/Specialty  Pulmonary    Disposition  TBD, wean vent      Review of Systems   Unable to perform ROS: acuity of condition      Physical Exam  Laboratory/Imaging   Hemodynamics  Temp (24hrs), Av.8 °C (100 °F), Min:36.9 °C (98.4 °F), Max:39.5 °C (103.1 °F)   Temperature: (!) 39.5 °C (103.1 °F)  Pulse  Av.1  Min: 59  Max: 145 Heart Rate (Monitored): 95  NIBP: 106/50 mmHg      Respiratory  Carbajal Vent Mode: APVCMV, Rate (breaths/min): 20, PEEP/CPAP: 8, PEEP/CPAP: 8, FiO2: 30, P Peak (PIP): 20, P MEAN: 12   Respiration: 20, Pulse Oximetry: 100 %     Work Of Breathing / Effort: Vented  RUL Breath Sounds: Diminished, RML Breath Sounds: Diminished, RLL Breath Sounds: Diminished, LION Breath Sounds: Diminished, LLL Breath Sounds: Diminished    Fluids    Intake/Output Summary (Last 24 hours) at 17 193  Last data filed at 17 1800   Gross per 24 hour   Intake   4680 ml   Output   4040 ml   Net    640 ml       Nutrition  Orders Placed This Encounter   Procedures   • Diet NPO     Standing Status: Standing      Number of Occurrences: 1      Standing Expiration Date:      Order Specific Question:  Type:     Answer:  Now [1]     Order Specific Question:  Restrict to:     Answer:  Strict [1]     Physical Exam   Constitutional: No distress. He is sedated, intubated and restrained.   obese   HENT:   Head: Normocephalic and atraumatic.   Nose: Nose normal.   Eyes: Conjunctivae are normal. Right eye exhibits no discharge. Left eye exhibits no discharge. No scleral icterus.   Neck: No tracheal deviation present.   Cardiovascular: Normal rate, regular  rhythm, normal heart sounds and intact distal pulses.    No murmur heard.  Pulmonary/Chest: Effort normal and breath sounds normal. No stridor. He is intubated. No respiratory distress. He has no wheezes.   Abdominal: Soft. Bowel sounds are normal. He exhibits no distension. There is no tenderness.   Musculoskeletal: He exhibits no edema.   Skin: Skin is warm. He is not diaphoretic.   Vitals reviewed.      Recent Labs      06/25/17   1207  06/26/17   0520  06/27/17   0523   WBC  7.2  8.3  6.0   RBC  2.97*  2.33*  2.44*   HEMOGLOBIN  9.8*  7.7*  7.9*   HEMATOCRIT  29.3*  22.8*  23.7*   MCV  98.7*  96.6  97.1   MCH  33.0  32.6  32.4   MCHC  33.4*  33.8  33.3*   RDW  55.0*  54.0*  53.8*   PLATELETCT  130*  106*  116*   MPV  10.7  10.5  10.3     Recent Labs      06/25/17   0512  06/26/17   0520  06/27/17   0523  06/27/17   1205  06/27/17   1715   SODIUM  143  140  138   --    --    POTASSIUM  3.1*  3.4*  3.6  4.1  4.0   CHLORIDE  118*  111  108   --    --    CO2  17*  24  25   --    --    GLUCOSE  97  125*  134*   --    --    BUN  9  20  20   --    --    CREATININE  0.43*  0.55  0.51   --    --    CALCIUM  6.4*  7.9*  7.8*   --    --              Recent Labs      06/25/17   0512   TRIGLYCERIDE  122          Assessment/Plan     Aspiration pneumonitis (CMS-HCC)  Assessment & Plan  Extubated on 6/24 but required re-intubation  Fever but no significant increase in WBC  Continue zosyn    Respiratory failure (CMS-HCC)  Assessment & Plan  Pulmonary consulting  Wean vent as tolerated    Alcohol abuse (present on admission)  Assessment & Plan  Phenobarbital protocol  Folate, thiamine.    Anemia  Assessment & Plan  No sign of acute bleed.  Hemoglobin low but stable  Continue PPi    Hypokalemia  Assessment & Plan  Replace and recheck bmp      Radiology images reviewed, Labs reviewed and Medications reviewed  Herring catheter: Critically Ill - Requiring Accurate Measurement of Urinary Output and Unconscious / Sedated Patient on a  Ventilator

## 2017-06-29 ENCOUNTER — APPOINTMENT (OUTPATIENT)
Dept: RADIOLOGY | Facility: MEDICAL CENTER | Age: 77
DRG: 163 | End: 2017-06-29
Attending: INTERNAL MEDICINE
Payer: MEDICARE

## 2017-06-29 LAB
ANION GAP SERPL CALC-SCNC: 8 MMOL/L (ref 0–11.9)
BASE EXCESS BLDA CALC-SCNC: 5 MMOL/L (ref -4–3)
BASOPHILS # BLD AUTO: 0.3 % (ref 0–1.8)
BASOPHILS # BLD: 0.02 K/UL (ref 0–0.12)
BODY TEMPERATURE: ABNORMAL DEGREES
BUN SERPL-MCNC: 20 MG/DL (ref 8–22)
CALCIUM SERPL-MCNC: 8 MG/DL (ref 8.5–10.5)
CHLORIDE SERPL-SCNC: 104 MMOL/L (ref 96–112)
CO2 BLDA-SCNC: 29 MMOL/L (ref 20–33)
CO2 SERPL-SCNC: 26 MMOL/L (ref 20–33)
CREAT SERPL-MCNC: 0.54 MG/DL (ref 0.5–1.4)
EOSINOPHIL # BLD AUTO: 0.14 K/UL (ref 0–0.51)
EOSINOPHIL NFR BLD: 2.2 % (ref 0–6.9)
ERYTHROCYTE [DISTWIDTH] IN BLOOD BY AUTOMATED COUNT: 50.6 FL (ref 35.9–50)
GFR SERPL CREATININE-BSD FRML MDRD: >60 ML/MIN/1.73 M 2
GLUCOSE SERPL-MCNC: 141 MG/DL (ref 65–99)
HCO3 BLDA-SCNC: 28.4 MMOL/L (ref 17–25)
HCT VFR BLD AUTO: 25.5 % (ref 42–52)
HGB BLD-MCNC: 8.4 G/DL (ref 14–18)
IMM GRANULOCYTES # BLD AUTO: 0.05 K/UL (ref 0–0.11)
IMM GRANULOCYTES NFR BLD AUTO: 0.8 % (ref 0–0.9)
LYMPHOCYTES # BLD AUTO: 0.56 K/UL (ref 1–4.8)
LYMPHOCYTES NFR BLD: 8.7 % (ref 22–41)
MCH RBC QN AUTO: 32.2 PG (ref 27–33)
MCHC RBC AUTO-ENTMCNC: 32.9 G/DL (ref 33.7–35.3)
MCV RBC AUTO: 97.7 FL (ref 81.4–97.8)
MONOCYTES # BLD AUTO: 0.69 K/UL (ref 0–0.85)
MONOCYTES NFR BLD AUTO: 10.7 % (ref 0–13.4)
NEUTROPHILS # BLD AUTO: 4.98 K/UL (ref 1.82–7.42)
NEUTROPHILS NFR BLD: 77.3 % (ref 44–72)
NRBC # BLD AUTO: 0 K/UL
NRBC BLD AUTO-RTO: 0 /100 WBC
O2/TOTAL GAS SETTING VFR VENT: 30 %
PCO2 BLDA: 34.1 MMHG (ref 26–37)
PCO2 TEMP ADJ BLDA: 34.1 MMHG (ref 26–37)
PH BLDA: 7.53 [PH] (ref 7.4–7.5)
PH TEMP ADJ BLDA: 7.53 [PH] (ref 7.4–7.5)
PLATELET # BLD AUTO: 160 K/UL (ref 164–446)
PMV BLD AUTO: 9.7 FL (ref 9–12.9)
PO2 BLDA: 64 MMHG (ref 64–87)
PO2 TEMP ADJ BLDA: 64 MMHG (ref 64–87)
POTASSIUM SERPL-SCNC: 3.5 MMOL/L (ref 3.6–5.5)
POTASSIUM SERPL-SCNC: 3.6 MMOL/L (ref 3.6–5.5)
POTASSIUM SERPL-SCNC: 3.6 MMOL/L (ref 3.6–5.5)
POTASSIUM SERPL-SCNC: 3.7 MMOL/L (ref 3.6–5.5)
RBC # BLD AUTO: 2.61 M/UL (ref 4.7–6.1)
SAO2 % BLDA: 94 % (ref 93–99)
SODIUM SERPL-SCNC: 138 MMOL/L (ref 135–145)
SPECIMEN DRAWN FROM PATIENT: ABNORMAL
WBC # BLD AUTO: 6.4 K/UL (ref 4.8–10.8)

## 2017-06-29 PROCEDURE — 700111 HCHG RX REV CODE 636 W/ 250 OVERRIDE (IP): Performed by: HOSPITALIST

## 2017-06-29 PROCEDURE — 94003 VENT MGMT INPAT SUBQ DAY: CPT

## 2017-06-29 PROCEDURE — 700111 HCHG RX REV CODE 636 W/ 250 OVERRIDE (IP): Performed by: INTERNAL MEDICINE

## 2017-06-29 PROCEDURE — 700101 HCHG RX REV CODE 250: Performed by: INTERNAL MEDICINE

## 2017-06-29 PROCEDURE — 99232 SBSQ HOSP IP/OBS MODERATE 35: CPT | Performed by: HOSPITALIST

## 2017-06-29 PROCEDURE — 94150 VITAL CAPACITY TEST: CPT

## 2017-06-29 PROCEDURE — 700102 HCHG RX REV CODE 250 W/ 637 OVERRIDE(OP): Performed by: HOSPITALIST

## 2017-06-29 PROCEDURE — 99291 CRITICAL CARE FIRST HOUR: CPT | Performed by: INTERNAL MEDICINE

## 2017-06-29 PROCEDURE — A9270 NON-COVERED ITEM OR SERVICE: HCPCS | Performed by: INTERNAL MEDICINE

## 2017-06-29 PROCEDURE — 82803 BLOOD GASES ANY COMBINATION: CPT

## 2017-06-29 PROCEDURE — 770022 HCHG ROOM/CARE - ICU (200)

## 2017-06-29 PROCEDURE — 36600 WITHDRAWAL OF ARTERIAL BLOOD: CPT

## 2017-06-29 PROCEDURE — 700102 HCHG RX REV CODE 250 W/ 637 OVERRIDE(OP): Performed by: INTERNAL MEDICINE

## 2017-06-29 PROCEDURE — 85025 COMPLETE CBC W/AUTO DIFF WBC: CPT

## 2017-06-29 PROCEDURE — 84132 ASSAY OF SERUM POTASSIUM: CPT

## 2017-06-29 PROCEDURE — A9270 NON-COVERED ITEM OR SERVICE: HCPCS | Performed by: HOSPITALIST

## 2017-06-29 PROCEDURE — 71010 DX-CHEST-PORTABLE (1 VIEW): CPT

## 2017-06-29 PROCEDURE — 80048 BASIC METABOLIC PNL TOTAL CA: CPT

## 2017-06-29 PROCEDURE — 94640 AIRWAY INHALATION TREATMENT: CPT

## 2017-06-29 PROCEDURE — 700105 HCHG RX REV CODE 258: Performed by: HOSPITALIST

## 2017-06-29 RX ORDER — POTASSIUM CHLORIDE 14.9 MG/ML
20 INJECTION INTRAVENOUS ONCE
Status: COMPLETED | OUTPATIENT
Start: 2017-06-29 | End: 2017-06-29

## 2017-06-29 RX ORDER — FUROSEMIDE 10 MG/ML
INJECTION INTRAMUSCULAR; INTRAVENOUS
Status: ACTIVE
Start: 2017-06-29 | End: 2017-06-30

## 2017-06-29 RX ADMIN — IPRATROPIUM BROMIDE AND ALBUTEROL SULFATE 3 ML: .5; 3 SOLUTION RESPIRATORY (INHALATION) at 18:24

## 2017-06-29 RX ADMIN — POTASSIUM CHLORIDE 20 MEQ: 14.9 INJECTION, SOLUTION INTRAVENOUS at 14:29

## 2017-06-29 RX ADMIN — FUROSEMIDE 40 MG: 10 INJECTION, SOLUTION INTRAMUSCULAR; INTRAVENOUS at 16:26

## 2017-06-29 RX ADMIN — PROPOFOL 20 MCG/KG/MIN: 10 INJECTION, EMULSION INTRAVENOUS at 03:23

## 2017-06-29 RX ADMIN — PIPERACILLIN SODIUM AND TAZOBACTAM SODIUM 3.38 G: 3; .375 INJECTION, POWDER, FOR SOLUTION INTRAVENOUS at 22:00

## 2017-06-29 RX ADMIN — PIPERACILLIN SODIUM AND TAZOBACTAM SODIUM 3.38 G: 3; .375 INJECTION, POWDER, FOR SOLUTION INTRAVENOUS at 16:26

## 2017-06-29 RX ADMIN — POTASSIUM CHLORIDE 20 MEQ: 14.9 INJECTION, SOLUTION INTRAVENOUS at 01:17

## 2017-06-29 RX ADMIN — OMEPRAZOLE 40 MG: 20 CAPSULE, DELAYED RELEASE ORAL at 19:49

## 2017-06-29 RX ADMIN — THERA TABS 1 TABLET: TAB at 09:37

## 2017-06-29 RX ADMIN — IPRATROPIUM BROMIDE AND ALBUTEROL SULFATE 3 ML: .5; 3 SOLUTION RESPIRATORY (INHALATION) at 22:51

## 2017-06-29 RX ADMIN — PROPOFOL 25 MCG/KG/MIN: 10 INJECTION, EMULSION INTRAVENOUS at 20:18

## 2017-06-29 RX ADMIN — FOLIC ACID 1 MG: 1 TABLET ORAL at 09:38

## 2017-06-29 RX ADMIN — SENNOSIDES AND DOCUSATE SODIUM 2 TABLET: 8.6; 5 TABLET ORAL at 09:38

## 2017-06-29 RX ADMIN — SENNOSIDES AND DOCUSATE SODIUM 2 TABLET: 8.6; 5 TABLET ORAL at 19:49

## 2017-06-29 RX ADMIN — IPRATROPIUM BROMIDE AND ALBUTEROL SULFATE 3 ML: .5; 3 SOLUTION RESPIRATORY (INHALATION) at 10:20

## 2017-06-29 RX ADMIN — OMEPRAZOLE 40 MG: 20 CAPSULE, DELAYED RELEASE ORAL at 09:37

## 2017-06-29 RX ADMIN — CHLORHEXIDINE GLUCONATE 15 ML: 1.2 RINSE ORAL at 19:49

## 2017-06-29 RX ADMIN — PIPERACILLIN SODIUM AND TAZOBACTAM SODIUM 3.38 G: 3; .375 INJECTION, POWDER, FOR SOLUTION INTRAVENOUS at 09:37

## 2017-06-29 RX ADMIN — PROPOFOL 30 MCG/KG/MIN: 10 INJECTION, EMULSION INTRAVENOUS at 11:32

## 2017-06-29 RX ADMIN — FENTANYL CITRATE 100 MCG: 50 INJECTION, SOLUTION INTRAMUSCULAR; INTRAVENOUS at 20:17

## 2017-06-29 RX ADMIN — FUROSEMIDE 40 MG: 10 INJECTION, SOLUTION INTRAMUSCULAR; INTRAVENOUS at 05:26

## 2017-06-29 RX ADMIN — POTASSIUM CHLORIDE 20 MEQ: 14.9 INJECTION, SOLUTION INTRAVENOUS at 06:26

## 2017-06-29 RX ADMIN — PIPERACILLIN SODIUM AND TAZOBACTAM SODIUM 3.38 G: 3; .375 INJECTION, POWDER, FOR SOLUTION INTRAVENOUS at 03:23

## 2017-06-29 RX ADMIN — IPRATROPIUM BROMIDE AND ALBUTEROL SULFATE 3 ML: .5; 3 SOLUTION RESPIRATORY (INHALATION) at 06:22

## 2017-06-29 RX ADMIN — POTASSIUM CHLORIDE 20 MEQ: 14.9 INJECTION, SOLUTION INTRAVENOUS at 19:49

## 2017-06-29 RX ADMIN — CHLORHEXIDINE GLUCONATE 15 ML: 1.2 RINSE ORAL at 09:38

## 2017-06-29 RX ADMIN — IPRATROPIUM BROMIDE AND ALBUTEROL SULFATE 3 ML: .5; 3 SOLUTION RESPIRATORY (INHALATION) at 14:33

## 2017-06-29 RX ADMIN — Medication 100 MG: at 09:38

## 2017-06-29 ASSESSMENT — PULMONARY FUNCTION TESTS
FVC: .9
FVC: 1.1

## 2017-06-29 NOTE — PROGRESS NOTES
Received report and assumed patient care. Patient is intubated and sedated. VS stable. No family present at this time.

## 2017-06-29 NOTE — PROGRESS NOTES
[facility] Hospitalist Progress Note    Date of Service: 2017    Chief Complaint  77 y.o. male admitted 2017 with alcohol abuse admitted after fall and intubated in ED.    Interval Problem Update  More awake, he opens eyes partially, follows with all 4 ext  Is restless, still on propofol and fentanyl for sedation  Blood pressure stable  Tube feeds at goal    Consultants/Specialty  Pulmonary    Disposition  TBD, wean vent      Review of Systems   Unable to perform ROS: intubated      Physical Exam  Laboratory/Imaging   Hemodynamics  Temp (24hrs), Av.1 °C (98.7 °F), Min:36.6 °C (97.9 °F), Max:37.5 °C (99.5 °F)   Temperature: 36.9 °C (98.4 °F)  Pulse  Av  Min: 55  Max: 145 Heart Rate (Monitored): 79  NIBP: 108/51 mmHg      Respiratory  Carbajal Vent Mode: APVCMV, Rate (breaths/min): 20, PEEP/CPAP: 8, PEEP/CPAP: 8, FiO2: 30, P Peak (PIP): 22, P MEAN: 12   Respiration: 20, Pulse Oximetry: 97 %     Work Of Breathing / Effort: Vented  RUL Breath Sounds: Clear, RML Breath Sounds: Clear, RLL Breath Sounds: Diminished, LION Breath Sounds: Clear, LLL Breath Sounds: Diminished    Fluids    Intake/Output Summary (Last 24 hours) at 17 1239  Last data filed at 17 0826   Gross per 24 hour   Intake 1957.37 ml   Output   4495 ml   Net -2537.63 ml       Nutrition  Orders Placed This Encounter   Procedures   • Diet NPO     Standing Status: Standing      Number of Occurrences: 1      Standing Expiration Date:      Order Specific Question:  Type:     Answer:  Now [1]     Order Specific Question:  Restrict to:     Answer:  Strict [1]     Physical Exam   Constitutional: No distress. He is sedated, intubated and restrained.   obese   HENT:   Head: Normocephalic and atraumatic.   Nose: Nose normal.   Face is swollen   Eyes: Conjunctivae are normal. Right eye exhibits no discharge. Left eye exhibits no discharge.   Neck: No tracheal deviation present.   Cardiovascular: Normal rate, regular rhythm, normal heart sounds  and intact distal pulses.    No murmur heard.  Pulmonary/Chest: Effort normal and breath sounds normal. No stridor. He is intubated. No respiratory distress. He has no wheezes.   Abdominal: Soft. Bowel sounds are normal. He exhibits no distension. There is no tenderness. There is no guarding.   Musculoskeletal: He exhibits no edema.   Skin: Skin is warm. He is not diaphoretic.   Vitals reviewed.      Recent Labs      06/27/17   0523  06/28/17   0510  06/29/17   0520   WBC  6.0  7.5  6.4   RBC  2.44*  2.50*  2.61*   HEMOGLOBIN  7.9*  8.2*  8.4*   HEMATOCRIT  23.7*  24.2*  25.5*   MCV  97.1  96.8  97.7   MCH  32.4  32.8  32.2   MCHC  33.3*  33.9  32.9*   RDW  53.8*  51.8*  50.6*   PLATELETCT  116*  127*  160*   MPV  10.3  10.5  9.7     Recent Labs      06/28/17   1200  06/28/17   1715  06/29/17   0018  06/29/17   0520   SODIUM  139  141   --   138   POTASSIUM  3.5*  3.5*  3.5*  3.6   CHLORIDE  105  106   --   104   CO2  28  28   --   26   GLUCOSE  131*  138*   --   141*   BUN  21  20   --   20   CREATININE  0.51  0.55   --   0.54   CALCIUM  8.2*  8.2*   --   8.0*             Recent Labs      06/28/17   0510   TRIGLYCERIDE  233*          Assessment/Plan     Aspiration pneumonitis (CMS-HCC)  Assessment & Plan  Extubated on 6/24 but required re-intubation  Continue zosyn    Respiratory failure (CMS-HCC)  Assessment & Plan  Pulmonary consulting  He is more awake today  Wean vent as tolerated    Alcohol abuse (present on admission)  Assessment & Plan  Phenobarbital protocol  Folate, thiamine.    Anemia (present on admission)  Assessment & Plan  No sign of acute bleed.  Hemoglobin low but stable  Continue PPi    Hypokalemia  Assessment & Plan  Repleated      Radiology images reviewed, Labs reviewed and Medications reviewed  Herring catheter: Critically Ill - Requiring Accurate Measurement of Urinary Output and Unconscious / Sedated Patient on a Ventilator

## 2017-06-29 NOTE — CARE PLAN
Problem: Ventilation Defect:  Goal: Ability to achieve and maintain unassisted ventilation or tolerate decreased levels of ventilator support  Outcome: PROGRESSING AS EXPECTED  Adult Ventilation Update    Total Vent Days: 7      Patient Lines/Drains/Airways Status    Active Airway      Name: Placement date: Placement time: Site: Days:     Airway Group ET Tube 8.0 06/24/17 1845    7                 In the last 24 hours, the patient tolerated SBT for 4.5 on settings of 5/8.    #FVC / Vital Capacity (liters) :  (doesn't follow commands) (06/28/17 1010)  NIF (cm H2O) :  (poor parameters.) (06/28/17 1010)  Rapid Shallow Breathing Index (RR/VT): 88 (06/28/17 1010)  Plateau Pressure (Q Shift): 16 (06/28/17 2230)  Static Compliance (ml / cm H2O): 5.9 (06/29/17 0329)    Patient failed trials because of Barriers to Wean: Other (Comments) (06/24/17 0700)  Barriers to SBT Weaning Trial Stopped due to:: RSBI >105 (Rapid Shallow Breathing Index) (06/28/17 1010)  Length of Weaning Trial Length of Weaning Trial (Hours): 3.5 hours (06/28/17 1010)  Cough: Productive (06/29/17 0329)  Sputum Amount: Small (06/29/17 0329)  Sputum Color: White;Clear (06/29/17 0329)  Sputum Consistency: Thin;Thick (06/29/17 0329)    Mobility Group  Activity Performed: Unable to mobilize (06/29/17 0400)  Time Activity Tolerated: 5 min (06/24/17 0400)  Assistance / Tolerance: Assistance of Two or More (06/29/17 0400)  Pt Calls for Assistance: No (06/29/17 0400)  Staff Present for Mobilization: RN (rn x3) (06/24/17 0400)  Assistive Devices: None (06/24/17 0400)  Reason Not Mobilized: Other (comment) (Rass -3 ) (06/29/17 0400)  Mobilization Comments: RASS -3 (with severe agitation) (06/28/17 0800)    Events/Summary/Plan: Vent Check (06/29/17 0329)

## 2017-06-29 NOTE — PROGRESS NOTES
Pulmonary Critical Care Progress Note        Date of Service: 6/29/2017      Chief Complaint: GLF and ETOH    History of Present Illness:   This is a 77-year-old male admitted to the ICU through the ED on 6/23/17 for acute alcohol intoxication and ground level fall. Patient intubated in the ED.     ROS:  Respiratory: unable to perform due to the patient's inability to effectively communicate, Cardiac: unable to perform due to the patient's inability to effectively communicate, GI: unable to perform due to the patient's inability to effectively communicate.  All other systems negative.    Interval Events:  24 hour interval history reviewed   Alert and oriented x4, no complaints overnight.   SR, -120 systolic.   SBT 1hr this AM - NiF -0.2, VC 0.9.   Day 5/10 Zosyn  CXR with bilateral pulmonary edema.     PFSH:  No change.    Physical Exam:  General:  Pleasant, calm, breathing comfortably.   HEENT:  Pupils 2-3mm and reactive, EOMI, conjunctiva pink, sclera anicteric, periorbital swelling decreased on right.  CVS:  RRR.  Respiratory:  Clear,  Abdomen:  Soft, NT/ND.  Extremities:  2+ DPP, no edema.   Neuro/Psych:  Symmetrical facial expressions, following commands, but slowly. Moving all extremities.     Respiratory:  Carbajal Vent Mode: APVCMV, Rate (breaths/min): 20, Vt Target (mL): 460, PEEP/CPAP: 8, FiO2: 30, Static Compliance (ml / cm H2O): 48.4, Control VTE (exp VT): 456  Pulse Oximetry: 99 %  ImagingAvailable data reviewed   Recent Labs      06/27/17   0405  06/28/17   0440  06/29/17   0444   ISTATAPH  7.452  7.478  7.528*   ISTATAPCO2  33.9  32.8  34.1   ISTATAPO2  71  94*  64   ISTATATCO2  25  25  29   JHEPTXB2AQR  95  98  94   ISTATARTHCO3  23.7  24.3  28.4*   ISTATARTBE  0  1  5*   ISTATTEMP  37.5 C  37.5 C  37.0 C   ISTATFIO2  30  30  30   ISTATSPEC  Arterial  Arterial  Arterial   ISTATAPHTC  7.445  7.470  7.528*   KANRFXLQ2CS  73  97*  64     HemoDynamics:  Pulse: 81, Heart Rate (Monitored): 85  NIBP:  103/55 mmHg     Imaging: Available data reviewed   Maintenance fluid 10    Neuro:  GCS Total Donny Coma Score: 9  Imaging: Available data reviewed   Propofol 20   Fentanyl 50     Fluids:  Intake/Output       06/27/17 0700 - 06/28/17 0659 06/28/17 0700 - 06/29/17 0659 06/29/17 0700 - 06/30/17 0659      7583-8692 1344-9367 Total [id number] 1900-0659 Total [id number] 1900-0659 Total       Intake    I.V.  1470  383 1853  734.4  511 1245.4  --  -- --    K+ Scale 200 200 400 400 -- 400 -- -- --    Propofol Volume 120 183 303 134.4 120 254.4 -- -- --    Fentanyl  -- -- -- -- 11 11 -- -- --    IV Volume (Normal saline) 900 -- 900 -- 80 80 -- -- --    IV Volume (IVPB) 250 -- 250 200 300 500 -- -- --    Other  230  90 320  60  60 120  --  -- --    Medications (P.O./ Enteral Liquids) 230 90 320 60 60 120 -- -- --    Enteral  900  720 1620  630  600 1230  --  -- --    Enteral Volume   -- -- --    Free Water / Tube Flush 360 180 540 90 60 150 -- -- --    Total Intake 2600 1193 3793 1424.4 1171 2595.4 -- -- --       Output    Urine  3150  795 3945  2790  900 3690  --  -- --    Indwelling Cathether 3150 795 3945 2790 900 3690 -- -- --    Drains  0  0 0  --  -- --  --  -- --    Residual Amount (ml) (Discarded) 0 0 0 -- -- -- -- -- --    Stool  --  -- --  --  -- --  --  -- --    Number of Times Stooled 1 x 1 x 2 x -- -- -- -- -- --    Total Output 3150 795 3945 2790 900 3690 -- -- --       Net I/O     -550 398 -152 -1365.6 271 -1094.6 -- -- --        Weight: 88.8 kg (195 lb 12.3 oz)  Recent Labs      06/27/17   0523   06/28/17   1200  06/28/17   1715  06/29/17   0018  06/29/17   0520   SODIUM  138   < >  139  141   --   138   POTASSIUM  3.6   < >  3.5*  3.5*  3.5*  3.6   CHLORIDE  108   < >  105  106   --   104   CO2  25   < >  28  28   --   26   BUN  20   < >  21  20   --   20   CREATININE  0.51   < >  0.51  0.55   --   0.54   MAGNESIUM  1.8   --    --    --    --    --    PHOSPHORUS  3.0   --    --    --     --    --    CALCIUM  7.8*   < >  8.2*  8.2*   --   8.0*    < > = values in this interval not displayed.     GI/Nutrition:  Imaging: Available data reviewed  NPO     Liver Function  Recent Labs      17   1200  17   1715  17   0520   GLUCOSE  131*  138*  141*     Heme:  Recent Labs      17   0523  17   0510  17   0520   RBC  2.44*  2.50*  2.61*   HEMOGLOBIN  7.9*  8.2*  8.4*   HEMATOCRIT  23.7*  24.2*  25.5*   PLATELETCT  116*  127*  160*     Infectious Disease:  Temp  Av.2 °C (98.9 °F)  Min: 36.6 °C (97.9 °F)  Max: 37.5 °C (99.5 °F)  Micro: reviewed   Recent Labs      17   0523  17   0510  17   0520   WBC  6.0  7.5  6.4   NEUTSPOLYS  87.60*  86.60*  77.30*   LYMPHOCYTES  3.50*  6.10*  8.70*   MONOCYTES  6.20  5.60  10.70   EOSINOPHILS  2.70  0.90  2.20   BASOPHILS  0.00  0.30  0.30     Current Facility-Administered Medications   Medication Dose Frequency Provider Last Rate Last Dose   • potassium chloride in water (KCL) ivpb **Administer in ICU only** 20 mEq  20 mEq Once Melida Baeza M.D. 50 mL/hr at 17 20 mEq at 17   • fentaNYL (SUBLIMAZE) 50 mcg/mL in 50mL (Continuous Infusion)  50 mcg/hr Continuous Melida Baeza M.D. 1 mL/hr at 17 50 mcg/hr at 17   • K+ Scale: Goal of 4.5  1 Each Q6HRS Melida Baeza M.D.   1 Each at 17 06   • furosemide (LASIX) injection 40 mg  40 mg BID DIURETIC Melida Baeza M.D.   40 mg at 17   • omeprazole 2 mg/mL in sodium bicarbonate (PRILOSEC) oral susp 40 mg  40 mg Q12HRS Pedro Seay D.O.   40 mg at 17   • piperacillin-tazobactam (ZOSYN) 3.375 g in  mL IVPB  3.375 g Q6HRS Avelino Giron M.D.   Stopped at 17   • oxycodone immediate-release (ROXICODONE) tablet 5 mg  5 mg Q4HRS PRN Gunnar Bryan M.D.       • oxycodone immediate release (ROXICODONE) tablet 10 mg  10 mg Q4HRS PRN Gunnar Bryan M.D.       • lorazepam (ATIVAN)  injection 1-2 mg  1-2 mg Q4HRS PRN Gunnar Bryan M.D.   1 mg at 06/24/17 1248   • KETOROLAC TROMETHAMINE (OPHTH) 0.4 % SOLN 1 Drop  1 Drop TWICE DAILY Gunnar Bryan M.D.   1 Drop at 06/28/17 2030   • Respiratory Care per Protocol   Continuous RT Gunnar Bryan M.D.       • MD ALERT...Adult ICU Electrolyte Replacement per Pharmacy Protocol   pharmacy to dose Gunnar Bryan M.D.       • fentaNYL (SUBLIMAZE) injection 25 mcg  25 mcg Q HOUR PRN Gunnar Bryan M.D.        Or   • fentaNYL (SUBLIMAZE) injection 50 mcg  50 mcg Q HOUR PRN Gunnar Bryan M.D.        Or   • fentaNYL (SUBLIMAZE) injection 100 mcg  100 mcg Q HOUR PRN Gunnar Bryan M.D.   100 mcg at 06/27/17 1653   • ipratropium-albuterol (DUONEB) nebulizer solution 3 mL  3 mL Q2HRS PRN (RT) Gunnar Bryan M.D.       • ipratropium-albuterol (DUONEB) nebulizer solution 3 mL  3 mL Q4HRS (RT) Gunnar Bryan M.D.   3 mL at 06/29/17 0622   • acetaminophen (TYLENOL) tablet 650 mg  650 mg Q4HRS PRN Pedro Vivas M.D.   650 mg at 06/27/17 1543   • acetaminophen (TYLENOL) suppository 650 mg  650 mg Q6HRS PRN Pedro Vivas M.D.   650 mg at 06/24/17 1955   • glucose 4 g chewable tablet 16 g  16 g Q15 MIN PRN Kaya Gonzalez, KENDALLD       • dextrose 50% (D50W) injection 25 mL  25 mL Q15 MIN PRN KENDALL KoD       • thiamine (THIAMINE) tablet 100 mg  100 mg DAILY Tunde Panchal M.D.   100 mg at 06/28/17 0804   • folic acid (FOLVITE) tablet 1 mg  1 mg DAILY Tunde Panchal M.D.   1 mg at 06/28/17 0804   • multivitamin (THERAGRAN) tablet 1 Tab  1 Tab DAILY Pedro Vivas M.D.   1 Tab at 06/28/17 0804   • senna-docusate (PERICOLACE or SENOKOT S) 8.6-50 MG per tablet 2 Tab  2 Tab BID Pedro Vivas M.D.   Stopped at 06/26/17 2100    And   • polyethylene glycol/lytes (MIRALAX) PACKET 1 Packet  1 Packet QDAY PRN Pedro Vivas M.D.        And   • magnesium hydroxide (MILK OF MAGNESIA) suspension 30 mL  30 mL QDAY PRN  Pedro Vivas M.D.        And   • bisacodyl (DULCOLAX) suppository 10 mg  10 mg QDAY PRN Pedro Vivas M.D.       • chlorhexidine (PERIDEX) 0.12 % solution 15 mL  15 mL BID Pedro Vivas M.D.   15 mL at 06/28/17 2031   • lidocaine (XYLOCAINE) 1%  injection  1-2 mL Q30 MIN PRN Pedro Vivas M.D.       • propofol (DIPRIVAN) injection  5-80 mcg/kg/min Continuous Pedro Vivas M.D. 10 mL/hr at 06/29/17 0323 20 mcg/kg/min at 06/29/17 0323     Last reviewed on 6/23/2017  7:57 PM by Ángel Bazan R.N.    Quality  Measures:  Labs reviewed, Medications reviewed and Radiology images reviewed  Herring catheter: Critically Ill - Requiring Accurate Measurement of Urinary Output  Central line in place: Need for access and Sepsis      DVT prophylaxis - mechanical: SCDs  Ulcer prophylaxis: Yes  Antibiotics: Treating active infection/contamination beyond 24 hours perioperative coverage    Assessment and Plan:  Acute Hypoxic Respiratory Failure   - trial extubation 6/24 with re-intubation in several hours on 6/24   - back on full vent support   - cont RT/O2 protocols   - s/p bronch with BAL: Pseudomonas aeruginosa and Klebsiella pneumonia   - Cont Zosyn for 10 days   - noted 15L positive fluid balance-->cont forced diuresis   - start SBTs  Status post ground level fall with facial trauma   - 3 cm laceration to his lip, which was sutured in [place], [place]   - seen by trauma and cleared    - Trauma following; CT abd/pelvis neg today; follow   - cont narcotics/pain control  Status post aspiration.   - empiric abx; f/u cxr  Acute alcohol intoxication with blood alcohol level of 0.206 in [place] at the time of presentation.   - sedation; monitor for w/d   - follow/correct lytes   - folate, thiamine, and MVI  Acute blood loss anemia.   - follow HH  Thrombocytopenia   - following  Hyponatremia.   - improved  Presumed diabetes mellitus   - glycemic control    Discussed patient condition and  risk of morbidity and/or mortality with RN, RT, Pharmacy, Charge nurse / hot rounds and hospitalist.    The patient remains critically ill.  Critical care time = 34 minutes in directly providing and coordinating critical care and extensive data review.  No time overlap and excludes procedures.    Maikel SOTOMAYOR (Scribe), am scribing for, and in the presence of, Melida Baeza M.D.    Electronically signed by: Maikel Carrasco (Katelinibaviva), 6/29/2017    Melida SOTOMAYOR M.D. personally performed the services described in this documentation, as scribed by Maikel Carrasco in my presence, and it is both accurate and complete.

## 2017-06-29 NOTE — CARE PLAN
Problem: Pain Management  Goal: Pain level will decrease to patient’s comfort goal  Outcome: PROGRESSING AS EXPECTED  Assess and monitor patient for pain.   Intervention: Follow pain managment plan developed in collaboration with patient and Interdisciplinary Team  Fentanyl gtt in use (see MAR).   Intervention: Educate and implement non-pharmacologic comfort measures. Examples: relaxation, distration, play therapy, activity therapy, massage, etc.  Allow patient to rest. Reposition patient every 2 hours and as needed.       Problem: Skin Integrity  Goal: Risk for impaired skin integrity will decrease  Outcome: PROGRESSING AS EXPECTED  Intervention: Assess risk factors for impaired skin integrity and/or pressure ulcers  Juan completed. Skins assessed.       Intervention: Implement precautions to protect skin integrity in collaboration with the interdisciplinary team  Q2 hour turns in place. Heel float boots in use.

## 2017-06-29 NOTE — CARE PLAN
Problem: Ventilation Defect:  Goal: Ability to achieve and maintain unassisted ventilation or tolerate decreased levels of ventilator support  Intervention: Support and monitor invasive and noninvasive mechanical ventilation  Adult Ventilation Update    Total Vent Days: 6  Vent.  x 20, 30% +8      Patient Lines/Drains/Airways Status    Active Airway      Name: Placement date: Placement time: Site: Days:     Airway Group ET Tube 8.0 06/24/17  1845    6                 In the last 24 hours, the patient tolerated SBT for 5.5 hours on settings of CPAP 5/8.    #FVC / Vital Capacity (liters) :  (doesn't follow commands)   NIF (cm H2O) :  (poor parameters.)   Rapid Shallow Breathing Index (RR/VT): 88     Mobility Group  Activity Performed: Unable to mobilize     Events/Summary/Plan: Patient stable on vent. No changes. Tolerates SBT fair. No weaning parameters.

## 2017-06-29 NOTE — DIETARY
"Nutrition Services: Weekly TF update    Current TF regimen with propofol: Peptamen Intense VHP @ goal rate 45 ml/hr, providing 1080 kcal (+Kcal from propofol), 101 grams protein, and 907 ml free water per day.    Height: 71\", Weight: 88.8 kg (), BMI= 27.32  Weight used in calculations: 83.9 kg (initial scale weight , currently +11.7 L fluid per I/O); BMI= 25.8  Ideal Body Weight: 78.019 kg (172 lb)    Labs: Glu 141, calcium 8.0, pre-albumin 9.0 (), CRP 22.64 (), procalcitonin 2.18 ()  Medications: Folic acid, lasix, K+ scale, adult electrolyte replacement protocol, MVI, prilsoec, potassium chloride, propofol, senokot, thiamine; prn bowel meds  Skin: Facial lacerations  GI: Last BM     Estimated Needs: REE per MSJ x1.2 = 1917 kcal/day; RMR per PSU (vent L/min 9.2, T max/24 hours 37.7) = 1901 kcal/day  Total Calories / day: 1850 - 2000 (Calories / k -  24)  Total Grams Protein / day: 101 - 125 (Grams Protein / k.2 - 1.5)  Total Fluids ml / day: 2090 ml (25 ml/kg)          Assessment / Evaluation:  Admit day 6.  Pt remains intubated.  TF @ goal with propofol.   Propofol @ 7.5 ml/hr provides 198 kcal/day. TF + propofol no longer meeting pt's estimated caloric needs. Will adjust TF.  Pt has not been requiring any insulin coverage.  Low-volume TF formula indicated.    Plan / Recommendation:  Change TF to Impact Peptide 1.5 @ goal rate (with and without propofol) 50 ml/hr to provide 1800 kcal (+/-Kcal from propofol), 113 grams protein, and 924 ml free water per day.  Fluids per MD.    RD following.  "

## 2017-06-29 NOTE — PROGRESS NOTES
[facility] Hospitalist Progress Note    Date of Service: 2017    Chief Complaint  77 y.o. male admitted 2017 with alcohol abuse admitted after fall and intubated in ED.    Interval Problem Update  Intubate and sedated, unable to give interval history  Moves all 4 ext, becomes too agitated to follow commands  Will withdraw to pain  SBP's 90's-100's, HR 50's-60's    Consultants/Specialty  Pulmonary    Disposition  TBD, wean vent      Review of Systems   Unable to perform ROS: intubated      Physical Exam  Laboratory/Imaging   Hemodynamics  Temp (24hrs), Av.6 °C (99.6 °F), Min:37.1 °C (98.8 °F), Max:38.4 °C (101.1 °F)   Temperature: 37.4 °C (99.3 °F)  Pulse  Av.3  Min: 55  Max: 145 Heart Rate (Monitored): 71  NIBP: 102/47 mmHg      Respiratory  Carbajal Vent Mode: Spont, Rate (breaths/min): 20, PEEP/CPAP: 8, PEEP/CPAP: 8, FiO2: 30, P Peak (PIP): 24, P MEAN: 13   Respiration: 19, Pulse Oximetry: 92 %     Work Of Breathing / Effort: Vented  RUL Breath Sounds: Diminished;Coarse Crackles, RML Breath Sounds: Diminished, RLL Breath Sounds: Diminished, LION Breath Sounds: Coarse Crackles;Diminished, LLL Breath Sounds: Diminished    Fluids    Intake/Output Summary (Last 24 hours) at 17 1817  Last data filed at 17 1600   Gross per 24 hour   Intake 2508.83 ml   Output    795 ml   Net 1713.83 ml       Nutrition  Orders Placed This Encounter   Procedures   • Diet NPO     Standing Status: Standing      Number of Occurrences: 1      Standing Expiration Date:      Order Specific Question:  Type:     Answer:  Now [1]     Order Specific Question:  Restrict to:     Answer:  Strict [1]     Physical Exam   Constitutional: No distress. He is sedated, intubated and restrained.   obese   HENT:   Head: Normocephalic and atraumatic.   Nose: Nose normal.   Face is swollen   Eyes: Conjunctivae are normal. Right eye exhibits no discharge. Left eye exhibits no discharge.   Neck: No tracheal deviation present.    Cardiovascular: Normal rate, regular rhythm, normal heart sounds and intact distal pulses.    No murmur heard.  Pulmonary/Chest: Effort normal and breath sounds normal. No stridor. He is intubated. No respiratory distress. He has no wheezes.   Abdominal: Soft. Bowel sounds are normal. He exhibits no distension. There is no tenderness. There is no guarding.   Musculoskeletal: He exhibits no edema.   Skin: Skin is warm. He is not diaphoretic.   Vitals reviewed.      Recent Labs      06/26/17   0520  06/27/17   0523  06/28/17   0510   WBC  8.3  6.0  7.5   RBC  2.33*  2.44*  2.50*   HEMOGLOBIN  7.7*  7.9*  8.2*   HEMATOCRIT  22.8*  23.7*  24.2*   MCV  96.6  97.1  96.8   MCH  32.6  32.4  32.8   MCHC  33.8  33.3*  33.9   RDW  54.0*  53.8*  51.8*   PLATELETCT  106*  116*  127*   MPV  10.5  10.3  10.5     Recent Labs      06/28/17   0510  06/28/17   1200  06/28/17   1715   SODIUM  137  139  141   POTASSIUM  3.9  3.5*  3.5*   CHLORIDE  104  105  106   CO2  26  28  28   GLUCOSE  129*  131*  138*   BUN  22  21  20   CREATININE  0.52  0.51  0.55   CALCIUM  7.9*  8.2*  8.2*             Recent Labs      06/28/17   0510   TRIGLYCERIDE  233*          Assessment/Plan     Aspiration pneumonitis (CMS-HCC)  Assessment & Plan  Extubated on 6/24 but required re-intubation  Fever but no significant increase in WBC  Continue zosyn    Respiratory failure (CMS-HCC)  Assessment & Plan  Pulmonary consulting  Wean vent as tolerated    Alcohol abuse (present on admission)  Assessment & Plan  Phenobarbital protocol  Folate, thiamine.    Anemia (present on admission)  Assessment & Plan  No sign of acute bleed.  Hemoglobin low but stable  Continue PPi    Hypokalemia  Assessment & Plan  Repleated      Radiology images reviewed, Labs reviewed and Medications reviewed  Herring catheter: Critically Ill - Requiring Accurate Measurement of Urinary Output and Unconscious / Sedated Patient on a Ventilator

## 2017-06-30 ENCOUNTER — APPOINTMENT (OUTPATIENT)
Dept: RADIOLOGY | Facility: MEDICAL CENTER | Age: 77
DRG: 163 | End: 2017-06-30
Attending: INTERNAL MEDICINE
Payer: MEDICARE

## 2017-06-30 LAB
ANION GAP SERPL CALC-SCNC: 6 MMOL/L (ref 0–11.9)
BASE EXCESS BLDA CALC-SCNC: 5 MMOL/L (ref -4–3)
BASOPHILS # BLD AUTO: 0.3 % (ref 0–1.8)
BASOPHILS # BLD: 0.02 K/UL (ref 0–0.12)
BODY TEMPERATURE: ABNORMAL DEGREES
BUN SERPL-MCNC: 23 MG/DL (ref 8–22)
CALCIUM SERPL-MCNC: 8.2 MG/DL (ref 8.5–10.5)
CHLORIDE SERPL-SCNC: 103 MMOL/L (ref 96–112)
CO2 BLDA-SCNC: 30 MMOL/L (ref 20–33)
CO2 SERPL-SCNC: 29 MMOL/L (ref 20–33)
CREAT SERPL-MCNC: 0.64 MG/DL (ref 0.5–1.4)
EOSINOPHIL # BLD AUTO: 0.1 K/UL (ref 0–0.51)
EOSINOPHIL NFR BLD: 1.3 % (ref 0–6.9)
ERYTHROCYTE [DISTWIDTH] IN BLOOD BY AUTOMATED COUNT: 49.1 FL (ref 35.9–50)
GFR SERPL CREATININE-BSD FRML MDRD: >60 ML/MIN/1.73 M 2
GLUCOSE SERPL-MCNC: 154 MG/DL (ref 65–99)
HCO3 BLDA-SCNC: 29.1 MMOL/L (ref 17–25)
HCT VFR BLD AUTO: 25 % (ref 42–52)
HGB BLD-MCNC: 8.3 G/DL (ref 14–18)
IMM GRANULOCYTES # BLD AUTO: 0.07 K/UL (ref 0–0.11)
IMM GRANULOCYTES NFR BLD AUTO: 0.9 % (ref 0–0.9)
LYMPHOCYTES # BLD AUTO: 0.77 K/UL (ref 1–4.8)
LYMPHOCYTES NFR BLD: 10.1 % (ref 22–41)
MCH RBC QN AUTO: 32.3 PG (ref 27–33)
MCHC RBC AUTO-ENTMCNC: 33.2 G/DL (ref 33.7–35.3)
MCV RBC AUTO: 97.3 FL (ref 81.4–97.8)
MONOCYTES # BLD AUTO: 0.73 K/UL (ref 0–0.85)
MONOCYTES NFR BLD AUTO: 9.6 % (ref 0–13.4)
NEUTROPHILS # BLD AUTO: 5.92 K/UL (ref 1.82–7.42)
NEUTROPHILS NFR BLD: 77.8 % (ref 44–72)
NRBC # BLD AUTO: 0 K/UL
NRBC BLD AUTO-RTO: 0 /100 WBC
O2/TOTAL GAS SETTING VFR VENT: 30 %
PCO2 BLDA: 42 MMHG (ref 26–37)
PCO2 TEMP ADJ BLDA: 43.7 MMHG (ref 26–37)
PH BLDA: 7.45 [PH] (ref 7.4–7.5)
PH TEMP ADJ BLDA: 7.43 [PH] (ref 7.4–7.5)
PLATELET # BLD AUTO: 196 K/UL (ref 164–446)
PMV BLD AUTO: 9.5 FL (ref 9–12.9)
PO2 BLDA: 59 MMHG (ref 64–87)
PO2 TEMP ADJ BLDA: 63 MMHG (ref 64–87)
POTASSIUM SERPL-SCNC: 3.7 MMOL/L (ref 3.6–5.5)
POTASSIUM SERPL-SCNC: 3.7 MMOL/L (ref 3.6–5.5)
POTASSIUM SERPL-SCNC: 3.8 MMOL/L (ref 3.6–5.5)
POTASSIUM SERPL-SCNC: 3.8 MMOL/L (ref 3.6–5.5)
RBC # BLD AUTO: 2.57 M/UL (ref 4.7–6.1)
SAO2 % BLDA: 91 % (ref 93–99)
SODIUM SERPL-SCNC: 138 MMOL/L (ref 135–145)
SPECIMEN DRAWN FROM PATIENT: ABNORMAL
WBC # BLD AUTO: 7.6 K/UL (ref 4.8–10.8)

## 2017-06-30 PROCEDURE — 700102 HCHG RX REV CODE 250 W/ 637 OVERRIDE(OP): Performed by: HOSPITALIST

## 2017-06-30 PROCEDURE — 71010 DX-CHEST-PORTABLE (1 VIEW): CPT

## 2017-06-30 PROCEDURE — 85025 COMPLETE CBC W/AUTO DIFF WBC: CPT

## 2017-06-30 PROCEDURE — 84132 ASSAY OF SERUM POTASSIUM: CPT | Mod: 91

## 2017-06-30 PROCEDURE — 700111 HCHG RX REV CODE 636 W/ 250 OVERRIDE (IP): Performed by: HOSPITALIST

## 2017-06-30 PROCEDURE — 36600 WITHDRAWAL OF ARTERIAL BLOOD: CPT

## 2017-06-30 PROCEDURE — A9270 NON-COVERED ITEM OR SERVICE: HCPCS | Performed by: INTERNAL MEDICINE

## 2017-06-30 PROCEDURE — 99232 SBSQ HOSP IP/OBS MODERATE 35: CPT | Performed by: HOSPITALIST

## 2017-06-30 PROCEDURE — 82803 BLOOD GASES ANY COMBINATION: CPT

## 2017-06-30 PROCEDURE — 700111 HCHG RX REV CODE 636 W/ 250 OVERRIDE (IP): Performed by: INTERNAL MEDICINE

## 2017-06-30 PROCEDURE — 700105 HCHG RX REV CODE 258: Performed by: HOSPITALIST

## 2017-06-30 PROCEDURE — 80048 BASIC METABOLIC PNL TOTAL CA: CPT

## 2017-06-30 PROCEDURE — A9270 NON-COVERED ITEM OR SERVICE: HCPCS | Performed by: HOSPITALIST

## 2017-06-30 PROCEDURE — 700102 HCHG RX REV CODE 250 W/ 637 OVERRIDE(OP): Performed by: INTERNAL MEDICINE

## 2017-06-30 PROCEDURE — 94150 VITAL CAPACITY TEST: CPT

## 2017-06-30 PROCEDURE — 770022 HCHG ROOM/CARE - ICU (200)

## 2017-06-30 PROCEDURE — 99291 CRITICAL CARE FIRST HOUR: CPT | Performed by: INTERNAL MEDICINE

## 2017-06-30 PROCEDURE — 94640 AIRWAY INHALATION TREATMENT: CPT

## 2017-06-30 PROCEDURE — 700101 HCHG RX REV CODE 250: Performed by: INTERNAL MEDICINE

## 2017-06-30 PROCEDURE — 94003 VENT MGMT INPAT SUBQ DAY: CPT

## 2017-06-30 RX ORDER — FUROSEMIDE 10 MG/ML
40 INJECTION INTRAMUSCULAR; INTRAVENOUS 3 TIMES DAILY
Status: DISCONTINUED | OUTPATIENT
Start: 2017-06-30 | End: 2017-07-04

## 2017-06-30 RX ORDER — POTASSIUM CHLORIDE 1.5 G/1.58G
20 POWDER, FOR SOLUTION ORAL 2 TIMES DAILY
Status: DISCONTINUED | OUTPATIENT
Start: 2017-06-30 | End: 2017-06-30

## 2017-06-30 RX ORDER — POTASSIUM CHLORIDE 1.5 G/1.58G
40 POWDER, FOR SOLUTION ORAL ONCE
Status: COMPLETED | OUTPATIENT
Start: 2017-06-30 | End: 2017-06-30

## 2017-06-30 RX ORDER — POTASSIUM CHLORIDE 14.9 MG/ML
20 INJECTION INTRAVENOUS ONCE
Status: COMPLETED | OUTPATIENT
Start: 2017-06-30 | End: 2017-06-30

## 2017-06-30 RX ORDER — POTASSIUM CHLORIDE 1.5 G/1.58G
40 POWDER, FOR SOLUTION ORAL 3 TIMES DAILY
Status: DISPENSED | OUTPATIENT
Start: 2017-06-30 | End: 2017-07-04

## 2017-06-30 RX ORDER — POTASSIUM CHLORIDE 7.45 MG/ML
10 INJECTION INTRAVENOUS ONCE
Status: COMPLETED | OUTPATIENT
Start: 2017-06-30 | End: 2017-06-30

## 2017-06-30 RX ADMIN — POTASSIUM CHLORIDE 20 MEQ: 14.9 INJECTION, SOLUTION INTRAVENOUS at 07:45

## 2017-06-30 RX ADMIN — OMEPRAZOLE 40 MG: 20 CAPSULE, DELAYED RELEASE ORAL at 09:02

## 2017-06-30 RX ADMIN — FENTANYL CITRATE 50 MCG/HR: 50 INJECTION, SOLUTION INTRAMUSCULAR; INTRAVENOUS at 09:02

## 2017-06-30 RX ADMIN — IPRATROPIUM BROMIDE AND ALBUTEROL SULFATE 3 ML: .5; 3 SOLUTION RESPIRATORY (INHALATION) at 02:29

## 2017-06-30 RX ADMIN — IPRATROPIUM BROMIDE AND ALBUTEROL SULFATE 3 ML: .5; 3 SOLUTION RESPIRATORY (INHALATION) at 11:06

## 2017-06-30 RX ADMIN — THERA TABS 1 TABLET: TAB at 09:02

## 2017-06-30 RX ADMIN — FUROSEMIDE 40 MG: 10 INJECTION, SOLUTION INTRAMUSCULAR; INTRAVENOUS at 14:58

## 2017-06-30 RX ADMIN — PIPERACILLIN SODIUM AND TAZOBACTAM SODIUM 3.38 G: 3; .375 INJECTION, POWDER, FOR SOLUTION INTRAVENOUS at 14:58

## 2017-06-30 RX ADMIN — POTASSIUM CHLORIDE 10 MEQ: 7.46 INJECTION, SOLUTION INTRAVENOUS at 01:42

## 2017-06-30 RX ADMIN — PIPERACILLIN SODIUM AND TAZOBACTAM SODIUM 3.38 G: 3; .375 INJECTION, POWDER, FOR SOLUTION INTRAVENOUS at 21:40

## 2017-06-30 RX ADMIN — Medication 100 MG: at 09:02

## 2017-06-30 RX ADMIN — CHLORHEXIDINE GLUCONATE 15 ML: 1.2 RINSE ORAL at 21:17

## 2017-06-30 RX ADMIN — CHLORHEXIDINE GLUCONATE 15 ML: 1.2 RINSE ORAL at 09:02

## 2017-06-30 RX ADMIN — FOLIC ACID 1 MG: 1 TABLET ORAL at 09:02

## 2017-06-30 RX ADMIN — IPRATROPIUM BROMIDE AND ALBUTEROL SULFATE 3 ML: .5; 3 SOLUTION RESPIRATORY (INHALATION) at 22:28

## 2017-06-30 RX ADMIN — PROPOFOL 25 MCG/KG/MIN: 10 INJECTION, EMULSION INTRAVENOUS at 06:12

## 2017-06-30 RX ADMIN — IPRATROPIUM BROMIDE AND ALBUTEROL SULFATE 3 ML: .5; 3 SOLUTION RESPIRATORY (INHALATION) at 19:09

## 2017-06-30 RX ADMIN — POTASSIUM CHLORIDE 20 MEQ: 1.5 POWDER, FOR SOLUTION ORAL at 14:15

## 2017-06-30 RX ADMIN — POTASSIUM CHLORIDE 40 MEQ: 1.5 POWDER, FOR SOLUTION ORAL at 21:45

## 2017-06-30 RX ADMIN — FUROSEMIDE 40 MG: 10 INJECTION, SOLUTION INTRAMUSCULAR; INTRAVENOUS at 21:18

## 2017-06-30 RX ADMIN — FUROSEMIDE 40 MG: 10 INJECTION, SOLUTION INTRAMUSCULAR; INTRAVENOUS at 06:08

## 2017-06-30 RX ADMIN — IPRATROPIUM BROMIDE AND ALBUTEROL SULFATE 3 ML: .5; 3 SOLUTION RESPIRATORY (INHALATION) at 07:02

## 2017-06-30 RX ADMIN — PIPERACILLIN SODIUM AND TAZOBACTAM SODIUM 3.38 G: 3; .375 INJECTION, POWDER, FOR SOLUTION INTRAVENOUS at 02:08

## 2017-06-30 RX ADMIN — POTASSIUM CHLORIDE 40 MEQ: 1.5 POWDER, FOR SOLUTION ORAL at 14:14

## 2017-06-30 RX ADMIN — IPRATROPIUM BROMIDE AND ALBUTEROL SULFATE 3 ML: .5; 3 SOLUTION RESPIRATORY (INHALATION) at 15:06

## 2017-06-30 RX ADMIN — OMEPRAZOLE 40 MG: 20 CAPSULE, DELAYED RELEASE ORAL at 21:18

## 2017-06-30 RX ADMIN — PIPERACILLIN SODIUM AND TAZOBACTAM SODIUM 3.38 G: 3; .375 INJECTION, POWDER, FOR SOLUTION INTRAVENOUS at 09:02

## 2017-06-30 RX ADMIN — SENNOSIDES AND DOCUSATE SODIUM 2 TABLET: 8.6; 5 TABLET ORAL at 09:02

## 2017-06-30 RX ADMIN — PROPOFOL 15 MCG/KG/MIN: 10 INJECTION, EMULSION INTRAVENOUS at 16:29

## 2017-06-30 RX ADMIN — SENNOSIDES AND DOCUSATE SODIUM 2 TABLET: 8.6; 5 TABLET ORAL at 21:18

## 2017-06-30 ASSESSMENT — PULMONARY FUNCTION TESTS: FVC: .95

## 2017-06-30 NOTE — CARE PLAN
Problem: Bronchoconstriction:  Goal: Improve in air movement and diminished wheezing  DUO Q4    Problem: Ventilation Defect:  Goal: Ability to achieve and maintain unassisted ventilation or tolerate decreased levels of ventilator support  Adult Ventilation Update    Total Vent Days: 8  RR20//PEEP 8/40%                 8.0@24  Patient Lines/Drains/Airways Status    Active Airway      Name: Placement date: Placement time: Site: Days:     Airway Group ET Tube 8.0 06/24/17 1845    8                 #FVC / Vital Capacity (liters) : 1.1 (06/29/17 1620)  NIF (cm H2O) : -9.5 (06/29/17 1620)  Rapid Shallow Breathing Index (RR/VT): 45 (06/29/17 1620)  Plateau Pressure (Q Shift): 20 (06/29/17 1824)  Static Compliance (ml / cm H2O): 49.3 (06/30/17 0228)    Barriers to SBT Weaning Trial Stopped due to:: Pt weaned for 1 hour and returned to rest settings per protocol (06/29/17 1620)  Length of Weaning Trial Length of Weaning Trial (Hours): 1 hr (06/29/17 0735)    Sputum/Suction   Cough: Non Productive (06/30/17 0228)  Sputum Amount: Small (06/30/17 0228)  Sputum Color: Clear;Unable to Evaluate;Tan (06/30/17 0228)  Sputum Consistency: Thick (06/30/17 0228)    Mobility Group  Activity Performed: Edge of bed (06/30/17 0000)  Time Activity Tolerated: 5 min (06/30/17 0000)  Assistance / Tolerance: Assistance of Two or More (06/30/17 0000)  Pt Calls for Assistance: No (06/30/17 0000)  Staff Present for Mobilization: RN;CNA (06/30/17 0000)  Assistive Devices: Hand held assist (06/30/17 0000)  Reason Not Mobilized: Other (comment) (06/29/17 0800)  Mobilization Comments:  (RASS -3) (06/29/17 0800)    Events/Summary/Plan: pt remained stable overnight. Increased fio2 to 40% post ABG

## 2017-06-30 NOTE — DOCUMENTATION QUERY
"DOCUMENTATION QUERY    Dear Dr. Lopez:   Please select “Cosign w/ note”to reply to query.    To better represent the severity of illness of your patient, please review the following information and exercise your independent professional judgment in responding to this query.     Septic Shock is documented in the History and Physical. Per subsequent progress notes there is no further mention of sepsis or septic shock until the Procedure Note dated 6/25/17 where it is documented:  \"RIJ Central Line Placement for Septic Shock\". Per Discharge Summary, there is no mention of Sepsis or Septic shock.   Based upon the clinical findings, risk factors, and treatment, please specify if this condition was present on admission or developed after admission, or ruled out.     Please select all that apply:   • Severe Sepsis with Septic shock, present on admission:  Please specify causative organism and any associated organ dysfunction if known  • Severe Sepsis without Septic shock, present on admission:  Please specify causative organism and any associated organ dysfunction if known  • Sepsis ruled in and present on admission: Please specify causative organism and any associated organ dysfunction if known   • Sepsis ruled out  • Other explanation of clinical findings  • Findings of no clinical significance  • Unable to determine      The medical record reflects the following:   Clinical Findings Heart rate on admit:  97  Respiratory rate on admit: 27  T max 103.6  WBCs on admit:  20.8  Respiratory cultures positive for: Pseudomonas and Klebsiella  Acute respiratory failure with hypoxia on admit   Severe metabolic acidosis on admit with Lactic Acid range: 2.2-3.6  Procalcitonin range 0.33-2.18    Treatment Labs  Bronchoscopy with respiratory cultures  RIJ central line placement   Chest xray  Intubation with mechanical ventilation  NS ivf with NS ivf bolus  IV Zosyn  IV Unasyn  RT protocol  Duoneb treatments   Tylenol  Cooling blanket "    Risk Factors Age  Alcohol abuse with acute intoxication  Aspiration pneumonia  Diabetes  Facial trauma status post ground level fall with aspiration of blood    Location within medical record  History and Physical, Lab Results  and Procedure Note      Thank you,   Gabbi Wolf RN  Clinical   [phone]

## 2017-06-30 NOTE — PROGRESS NOTES
[facility] Hospitalist Progress Note    Date of Service: 2017    Chief Complaint  77 y.o. male admitted 2017 with alcohol abuse admitted after fall and intubated in ED.    Interval Problem Update  More awake, following some commands  To sedated/confused to provide any interval history  Tolerating tube feeds at goal rate      Consultants/Specialty  Pulmonary    Disposition  TBD, wean vent      Review of Systems   Unable to perform ROS: intubated      Physical Exam  Laboratory/Imaging   Hemodynamics  Temp (24hrs), Av.7 °C (99.9 °F), Min:37.5 °C (99.5 °F), Max:37.9 °C (100.2 °F)   Temperature: 37.8 °C (100 °F)  Pulse  Av.9  Min: 55  Max: 145 Heart Rate (Monitored): 84  NIBP: 115/54 mmHg      Respiratory  [place]al Vent Mode: APVCMV, Rate (breaths/min): 20, PEEP/CPAP: 8, PEEP/CPAP: 8, FiO2: 30, P Peak (PIP): 25, P MEAN: 13   Respiration: (!) 7, Pulse Oximetry: 96 %     Work Of Breathing / Effort: Vented  RUL Breath Sounds: Diminished, RML Breath Sounds: Diminished, RLL Breath Sounds: Diminished, LION Breath Sounds: Diminished, LLL Breath Sounds: Diminished    Fluids    Intake/Output Summary (Last 24 hours) at 17 1010  Last data filed at 17 0900   Gross per 24 hour   Intake 2336.08 ml   Output   3975 ml   Net -1638.92 ml       Nutrition  Orders Placed This Encounter   Procedures   • Diet NPO     Standing Status: Standing      Number of Occurrences: 1      Standing Expiration Date:      Order Specific Question:  Type:     Answer:  Now [1]     Order Specific Question:  Restrict to:     Answer:  Strict [1]     Physical Exam   Constitutional: No distress. He is sedated, intubated and restrained.   obese   HENT:   Head: Normocephalic and atraumatic.   Nose: Nose normal.   Face is swollen   Eyes: Conjunctivae are normal. Right eye exhibits no discharge. Left eye exhibits no discharge.   Neck: No tracheal deviation present.   Cardiovascular: Normal rate, regular rhythm, normal heart sounds and intact  distal pulses.    No murmur heard.  Pulmonary/Chest: No stridor. He is intubated. No respiratory distress.   Equal chest rise and fall  Mechanical breath sounds bilaterally  No overt wheezing     Abdominal: Soft. Bowel sounds are normal. He exhibits no distension. There is no tenderness.   Musculoskeletal: He exhibits no edema.   Neurological:   Moves all ext with purpose  Follows commands to  bilaterally   Skin: Skin is warm. He is not diaphoretic. No erythema.   Vitals reviewed.      Recent Labs      06/28/17   0510  06/29/17   0520  06/30/17   0610   WBC  7.5  6.4  7.6   RBC  2.50*  2.61*  2.57*   HEMOGLOBIN  8.2*  8.4*  8.3*   HEMATOCRIT  24.2*  25.5*  25.0*   MCV  96.8  97.7  97.3   MCH  32.8  32.2  32.3   MCHC  33.9  32.9*  33.2*   RDW  51.8*  50.6*  49.1   PLATELETCT  127*  160*  196   MPV  10.5  9.7  9.5     Recent Labs      06/28/17   1715   06/29/17   0520   06/29/17   1820  06/30/17   0015  06/30/17   0610   SODIUM  141   --   138   --    --    --   138   POTASSIUM  3.5*   < >  3.6   < >  3.7  3.8  3.7   CHLORIDE  106   --   104   --    --    --   103   CO2  28   --   26   --    --    --   29   GLUCOSE  138*   --   141*   --    --    --   154*   BUN  20   --   20   --    --    --   23*   CREATININE  0.55   --   0.54   --    --    --   0.64   CALCIUM  8.2*   --   8.0*   --    --    --   8.2*    < > = values in this interval not displayed.             Recent Labs      06/28/17   0510   TRIGLYCERIDE  233*          Assessment/Plan     Aspiration pneumonitis (CMS-HCC)  Assessment & Plan  Extubated on 6/24 but required re-intubation  BAL with pseudomonas and klebsiella  Continue zosyn    Respiratory failure (CMS-HCC)  Assessment & Plan  Pulmonary consulting  Again more awake  Treat pneumoina  Increase diuresis  Wean vent as tolerated    Alcohol abuse (present on admission)  Assessment & Plan  Out of withdrawal window  Folate, thiamine    Anemia (present on admission)  Assessment & Plan  No sign of acute  bleed.  Hemoglobin low but stable  Continue PPi    Hypokalemia  Assessment & Plan  Repleated      Radiology images reviewed, Labs reviewed and Medications reviewed  Herring catheter: Critically Ill - Requiring Accurate Measurement of Urinary Output and Unconscious / Sedated Patient on a Ventilator

## 2017-06-30 NOTE — CARE PLAN
Problem: Pain Management  Goal: Pain level will decrease to patient’s comfort goal  Intervention: Follow pain managment plan developed in collaboration with patient and Interdisciplinary Team  On fentanyl drip, see MAR, PRNs given based on CPOT score, assessed Q2 hours      Problem: Risk for Impaired Mobility--Activity Intolerance  Goal: Mobilize and/or Transfer Safely with Maximum [place]  Intervention: Progressive Mobilization--ADL Flow Sheet  Mobilized to EOB for 5 minutes, 2-3 person assist, limited assistance from patient, tires quickly

## 2017-06-30 NOTE — PROGRESS NOTES
Neris from Lab called with critical result of WBC 41.3 at 0545. Critical lab result read back to Neris.   Dr. Baeza notified of critical lab result at 0645.  Critical lab result read back by Dr. Baeza.

## 2017-06-30 NOTE — PROGRESS NOTES
Pulmonary Critical Care Progress Note        Date of Service: 6/30/2017      Chief Complaint: GLF and ETOH    History of Present Illness:   This is a 77-year-old male admitted to the ICU through the ED on 6/23/17 for acute alcohol intoxication and ground level fall. Patient intubated in the ED.     ROS:  Respiratory: unable to perform due to the patient's inability to effectively communicate, Cardiac: unable to perform due to the patient's inability to effectively communicate, GI: unable to perform due to the patient's inability to effectively communicate.  All other systems negative.    Interval Events:  24 hour interval history reviewed   Moves all extremities, restless, follows commands when sedation decreased.  SR, BP  systolic.  Total 5L urine output since yesterday via grover.   Mobilized edge of bed, but desatted quickly. FiO2 increased from 30% -> 40%.   SBT since 8:30AM - VC 1.0, no NiF. Decreased oral secretions suctioned.  Day 6/10 Zosyn  CXR with pulmonary edema.    Plan:  Increase Lasix TID    PFSH:  No change.     Physical Exam:  General:  Sleepy, but wakes with any stimulation  HEENT:  Significant improved periorbital swelling to right eye.   CVS:  Regular rate and rhythm.  Respiratory:  PIP 28, more coarse on the right compared to left.   Abdomen:  Distended, BS present, soft, NT, no masses.   Extremities:  Moving all extremities left > right, 2+ DPP, no pedal edema.  Neuro/Psych:  Moves all spontaneously, does not follow commands.     Respiratory:  Carbajal Vent Mode: APVCMV, Rate (breaths/min): 20, Vt Target (mL): 460, PEEP/CPAP: 8, FiO2: 30, Static Compliance (ml / cm H2O): 46.1, Control VTE (exp VT): 463  Pulse Oximetry: 94 %  ImagingAvailable data reviewed   Recent Labs      06/28/17   0440  06/29/17   0444  06/30/17   0451   ISTATAPH  7.478  7.528*  7.448   ISTATAPCO2  32.8  34.1  42.0*   ISTATAPO2  94*  64  59*   ISTATATCO2  25  29  30   [id number]  98  94  91*   ISTATARTHCO3  24.3   28.4*  29.1*   ISTATARTBE  1  5*  5*   ISTATTEMP  37.5 C  37.0 C  37.9 C   ISTATFIO2  30  30  30   ISTATSPEC  Arterial  Arterial  Arterial   ISTATAPHTC  7.470  7.528*  7.434   [id number]  97*  64  63*     HemoDynamics:  Pulse: 87, Heart Rate (Monitored): 91  NIBP: 112/50 mmHg     Imaging: Available data reviewed   Maintenance fluid 10    Neuro:  GCS Total [place] Coma Score: 7  Imaging: Available data reviewed   Propofol 25  Fentanyl 50     Fluids:  Intake/Output       06/28/17 0700 - 06/29/17 0659 06/29/17 0700 - 06/30/17 0659 06/30/17 0700 - 07/01/17 0659      4167-2746 4463-8511 Total 0700-1859 1900-0659 Total 7871-0161 4756-0007 Total       Intake    I.V.  734.4  511 1245.4  568.1  568 1136.1  --  -- --    K+ Scale 400 -- 400 -- -- -- -- -- --    Propofol Volume 134.4 120 254.4 136.1 100 236.1 -- -- --    Fentanyl  -- 11 11 12 8 20 -- -- --    IV Volume (Normal saline) -- 80 80 120 260 380 -- -- --    IV Volume (IVPB) 200 300 500 300 200 500 -- -- --    Other  60  60 120  60  -- 60  --  -- --    Medications (P.O./ Enteral Liquids) 60 60 120 60 -- 60 -- -- --    Enteral  630  600 1230  610  550 1160  --  -- --    Enteral Volume  550 400 950 -- -- --    Free Water / Tube Flush 90 60 150 60 150 210 -- -- --    Total Intake 1424.4 1171 2595.4 1238.1 1118 2356.1 -- -- --       Output    Urine  2790  900 3690  4380  700 5080  --  -- --    Indwelling Cathether 2790 900 3690 4380 700 5080 -- -- --    Total Output 2790 900 3690 4380 700 5080 -- -- --       Net I/O     -1365.6 271 -1094.6 -3141.9 989 -9290.9 -- -- --           Recent Labs      06/28/17   1200  06/28/17   1715   06/29/17   0520  06/29/17   1220  06/29/17   1820  06/30/17   0015   SODIUM  139  141   --   138   --    --    --    POTASSIUM  3.5*  3.5*   < >  3.6  3.6  3.7  3.8   CHLORIDE  105  106   --   104   --    --    --    CO2  28  28   --   26   --    --    --    BUN  21  20   --   20   --    --    --    CREATININE  0.51  0.55   --   0.54    --    --    --    CALCIUM  8.2*  8.2*   --   8.0*   --    --    --     < > = values in this interval not displayed.     GI/Nutrition:  Imaging: Available data reviewed  NPO     Liver Function  Recent Labs      17   1200  17   1715  17   0520   GLUCOSE  131*  138*  141*     Heme:  Recent Labs      17   0510  17   0520   RBC  2.50*  2.61*   HEMOGLOBIN  8.2*  8.4*   HEMATOCRIT  24.2*  25.5*   PLATELETCT  127*  160*     Infectious Disease:  Temp  Av.6 °C (99.7 °F)  Min: 36.9 °C (98.4 °F)  Max: 37.9 °C (100.2 °F)  Micro: reviewed   Recent Labs      17   0510  17   0520   WBC  7.5  6.4   NEUTSPOLYS  86.60*  77.30*   LYMPHOCYTES  6.10*  8.70*   MONOCYTES  5.60  10.70   EOSINOPHILS  0.90  2.20   BASOPHILS  0.30  0.30     Current Facility-Administered Medications   Medication Dose Frequency Provider Last Rate Last Dose   • fentaNYL (SUBLIMAZE) 50 mcg/mL in 50mL (Continuous Infusion)  50 mcg/hr Continuous Melida Baeza M.D. 1 mL/hr at 17 50 mcg/hr at 17   • K+ Scale: Goal of 4.5  1 Each Q6HRS Melida Baeza M.D.   1 Each at 17 0000   • furosemide (LASIX) injection 40 mg  40 mg BID DIURETIC Melida Baeza M.D.   40 mg at 17 1626   • omeprazole 2 mg/mL in sodium bicarbonate (PRILOSEC) oral susp 40 mg  40 mg Q12HRS Pedro Seay DLucasOLucas   40 mg at 17   • piperacillin-tazobactam (ZOSYN) 3.375 g in  mL IVPB  3.375 g Q6HRS Avelino Giron M.D.   Stopped at 17 0238   • oxycodone immediate-release (ROXICODONE) tablet 5 mg  5 mg Q4HRS PRN Gunnar Bryan M.D.       • oxycodone immediate release (ROXICODONE) tablet 10 mg  10 mg Q4HRS PRN Gunnar Bryan M.D.       • lorazepam (ATIVAN) injection 1-2 mg  1-2 mg Q4HRS PRN Gunnar Bryan M.D.   1 mg at 17 1248   • Respiratory Care per Protocol   Continuous RT Gunnar Bryan M.D.       • MD ALERT...Adult ICU Electrolyte Replacement per Pharmacy Protocol   pharmacy to dose  Gunnar Bryan M.D.       • fentaNYL (SUBLIMAZE) injection 25 mcg  25 mcg Q HOUR PRN Gunnar Bryan M.D.        Or   • fentaNYL (SUBLIMAZE) injection 50 mcg  50 mcg Q HOUR PRN Gunnar Bryan M.D.        Or   • fentaNYL (SUBLIMAZE) injection 100 mcg  100 mcg Q HOUR PRN Gunnar Bryan M.D.   100 mcg at 06/29/17 2017   • ipratropium-albuterol (DUONEB) nebulizer solution 3 mL  3 mL Q2HRS PRN (RT) Gunnar Bryan M.D.       • ipratropium-albuterol (DUONEB) nebulizer solution 3 mL  3 mL Q4HRS (RT) Gunnar Bryan M.D.   3 mL at 06/30/17 0229   • acetaminophen (TYLENOL) tablet 650 mg  650 mg Q4HRS PRN Pedro Vivas M.D.   650 mg at 06/27/17 1543   • acetaminophen (TYLENOL) suppository 650 mg  650 mg Q6HRS PRN Pedro Vivas M.D.   650 mg at 06/24/17 1955   • glucose 4 g chewable tablet 16 g  16 g Q15 MIN PRN Kaya Gonzalez, PHARMD       • dextrose 50% (D50W) injection 25 mL  25 mL Q15 MIN PRN Kaya Gonzalez, PHARMD       • thiamine (THIAMINE) tablet 100 mg  100 mg DAILY Tunde Panchal M.D.   100 mg at 06/29/17 0938   • folic acid (FOLVITE) tablet 1 mg  1 mg DAILY Tunde Panchal M.D.   1 mg at 06/29/17 0938   • multivitamin (THERAGRAN) tablet 1 Tab  1 Tab DAILY Pedro Vivas M.D.   1 Tab at 06/29/17 0937   • senna-docusate (PERICOLACE or SENOKOT S) 8.6-50 MG per tablet 2 Tab  2 Tab BID Pedro Vivas M.D.   2 Tab at 06/29/17 1949    And   • polyethylene glycol/lytes (MIRALAX) PACKET 1 Packet  1 Packet QDAY PRN Pedro Vivas M.D.        And   • magnesium hydroxide (MILK OF MAGNESIA) suspension 30 mL  30 mL QDAY PRN Pedro Vivas M.D.        And   • bisacodyl (DULCOLAX) suppository 10 mg  10 mg QDAY PRN Pedro Vivas M.D.       • chlorhexidine (PERIDEX) 0.12 % solution 15 mL  15 mL BID Pedro Vivas M.D.   15 mL at 06/29/17 1949   • lidocaine (XYLOCAINE) 1%  injection  1-2 mL Q30 MIN PRN Pedro Vivas M.D.       • propofol (DIPRIVAN)  injection  5-80 mcg/kg/min Continuous Pedro Vivas M.D. 12.5 mL/hr at 06/29/17 2018 25 mcg/kg/min at 06/29/17 2018     Last reviewed on 6/23/2017  7:57 PM by Ángel Bazan R.N.    Quality  Measures:  Labs reviewed, Medications reviewed and Radiology images reviewed  Herring catheter: Critically Ill - Requiring Accurate Measurement of Urinary Output  Central line in place: Need for access and Sepsis      DVT prophylaxis - mechanical: SCDs  Ulcer prophylaxis: Yes  Antibiotics: Treating active infection/contamination beyond 24 hours perioperative coverage    Assessment and Plan:  Acute Hypoxic Respiratory Failure   - trial extubation 6/24 with re-intubation in several hours on 6/24   - back on full vent support   - cont RT/O2 protocols   - s/p bronch with BAL: Pseudomonas aeruginosa and Klebsiella pneumonia   - Cont Zosyn for 10 days   - noted 11L positive fluid balance-->cont forced diuresis and increase Lasix to 40mg TID   - cont SBTs  Status post ground level fall with facial trauma   - 3 cm laceration to his lip, which was sutured in [place], [place]   - seen by trauma and cleared    - Trauma following; CT abd/pelvis neg today; follow   - cont narcotics/pain control  Status post aspiration.   - empiric abx; f/u cxr  Acute alcohol intoxication with blood alcohol level of 0.206 in [place] at the time of presentation.   - sedation; monitor for w/d   - follow/correct lytes   - folate, thiamine, and MVI  Acute blood loss anemia.   - follow HH  Thrombocytopenia   - following  Hyponatremia.   - improved  Presumed diabetes mellitus   - glycemic control    Discussed patient condition and risk of morbidity and/or mortality with RN, RT, Pharmacy, Charge nurse / hot rounds and hospitalist.    The patient remains critically ill.  Critical care time = 35 minutes in directly providing and coordinating critical care and extensive data review.  No time overlap and excludes procedures.    IMaikel), am  scribing for, and in the presence of, Melida Baeza M.D.    Electronically signed by: Maikel Carrasco (Scribe), 6/30/2017    IMelida M.D. personally performed the services described in this documentation, as scribed by Maikel Carrasco in my presence, and it is both accurate and complete.

## 2017-06-30 NOTE — PROGRESS NOTES
Patient had 16 beats of VT this AM, asymptomatic. Later had rapid afib, asymptomatic. Dr. Baeza notified, no orders received. Will continue to monitor heart rate and rhythm.

## 2017-06-30 NOTE — CARE PLAN
Problem: Knowledge Deficit  Goal: Knowledge of disease process/condition, treatment plan, diagnostic tests, and medications will improve  Outcome: PROGRESSING SLOWER THAN EXPECTED  Pt remains intubated and on sedation.  Pt not able to understand complex education at this time.

## 2017-06-30 NOTE — CARE PLAN
Problem: Ventilation Defect:  Goal: Ability to achieve and maintain unassisted ventilation or tolerate decreased levels of ventilator support  Intervention: Support and monitor invasive and noninvasive mechanical ventilation  Adult Ventilation Update    Total Vent Days: 7      Patient Lines/Drains/Airways Status    Active Airway      Name: Placement date: Placement time: Site: Days:     Airway Group ET Tube 8.0 06/24/17  1845    7                 In the last 24 hours, the patient tolerated SBT for 2 hours on settings of 5/8.    #FVC / Vital Capacity (liters) : 1.1 (06/29/17 1620)  NIF (cm H2O) : -9.5 (06/29/17 1620)  Rapid Shallow Breathing Index (RR/VT): 45 (06/29/17 1620)  Plateau Pressure (Q Shift): 19 (06/29/17 0622)  Static Compliance (ml / cm H2O): 48 (06/29/17 1433)

## 2017-07-01 ENCOUNTER — APPOINTMENT (OUTPATIENT)
Dept: RADIOLOGY | Facility: MEDICAL CENTER | Age: 77
DRG: 163 | End: 2017-07-01
Attending: INTERNAL MEDICINE
Payer: MEDICARE

## 2017-07-01 LAB
ANION GAP SERPL CALC-SCNC: 8 MMOL/L (ref 0–11.9)
BASE EXCESS BLDA CALC-SCNC: 5 MMOL/L (ref -4–3)
BASOPHILS # BLD AUTO: 0.2 % (ref 0–1.8)
BASOPHILS # BLD: 0.02 K/UL (ref 0–0.12)
BODY TEMPERATURE: ABNORMAL DEGREES
BUN SERPL-MCNC: 27 MG/DL (ref 8–22)
CALCIUM SERPL-MCNC: 8.4 MG/DL (ref 8.5–10.5)
CHLORIDE SERPL-SCNC: 103 MMOL/L (ref 96–112)
CO2 BLDA-SCNC: 30 MMOL/L (ref 20–33)
CO2 SERPL-SCNC: 29 MMOL/L (ref 20–33)
CREAT SERPL-MCNC: 0.67 MG/DL (ref 0.5–1.4)
EOSINOPHIL # BLD AUTO: 0.14 K/UL (ref 0–0.51)
EOSINOPHIL NFR BLD: 1.5 % (ref 0–6.9)
ERYTHROCYTE [DISTWIDTH] IN BLOOD BY AUTOMATED COUNT: 49.6 FL (ref 35.9–50)
GFR SERPL CREATININE-BSD FRML MDRD: >60 ML/MIN/1.73 M 2
GLUCOSE SERPL-MCNC: 166 MG/DL (ref 65–99)
HCO3 BLDA-SCNC: 29.1 MMOL/L (ref 17–25)
HCT VFR BLD AUTO: 25.1 % (ref 42–52)
HGB BLD-MCNC: 8.4 G/DL (ref 14–18)
IMM GRANULOCYTES # BLD AUTO: 0.07 K/UL (ref 0–0.11)
IMM GRANULOCYTES NFR BLD AUTO: 0.7 % (ref 0–0.9)
LYMPHOCYTES # BLD AUTO: 0.82 K/UL (ref 1–4.8)
LYMPHOCYTES NFR BLD: 8.8 % (ref 22–41)
MAGNESIUM SERPL-MCNC: 2.1 MG/DL (ref 1.5–2.5)
MCH RBC QN AUTO: 32.7 PG (ref 27–33)
MCHC RBC AUTO-ENTMCNC: 33.5 G/DL (ref 33.7–35.3)
MCV RBC AUTO: 97.7 FL (ref 81.4–97.8)
MONOCYTES # BLD AUTO: 0.67 K/UL (ref 0–0.85)
MONOCYTES NFR BLD AUTO: 7.2 % (ref 0–13.4)
NEUTROPHILS # BLD AUTO: 7.65 K/UL (ref 1.82–7.42)
NEUTROPHILS NFR BLD: 81.6 % (ref 44–72)
NRBC # BLD AUTO: 0 K/UL
NRBC BLD AUTO-RTO: 0 /100 WBC
O2/TOTAL GAS SETTING VFR VENT: 40 %
PCO2 BLDA: 38.1 MMHG (ref 26–37)
PCO2 TEMP ADJ BLDA: 39.3 MMHG (ref 26–37)
PH BLDA: 7.49 [PH] (ref 7.4–7.5)
PH TEMP ADJ BLDA: 7.48 [PH] (ref 7.4–7.5)
PLATELET # BLD AUTO: 249 K/UL (ref 164–446)
PMV BLD AUTO: 9.5 FL (ref 9–12.9)
PO2 BLDA: 72 MMHG (ref 64–87)
PO2 TEMP ADJ BLDA: 75 MMHG (ref 64–87)
POTASSIUM SERPL-SCNC: 3.8 MMOL/L (ref 3.6–5.5)
POTASSIUM SERPL-SCNC: 4.1 MMOL/L (ref 3.6–5.5)
RBC # BLD AUTO: 2.57 M/UL (ref 4.7–6.1)
SAO2 % BLDA: 95 % (ref 93–99)
SODIUM SERPL-SCNC: 140 MMOL/L (ref 135–145)
SPECIMEN DRAWN FROM PATIENT: ABNORMAL
TRIGL SERPL-MCNC: 195 MG/DL (ref 0–149)
WBC # BLD AUTO: 9.4 K/UL (ref 4.8–10.8)

## 2017-07-01 PROCEDURE — 700102 HCHG RX REV CODE 250 W/ 637 OVERRIDE(OP): Performed by: INTERNAL MEDICINE

## 2017-07-01 PROCEDURE — 700105 HCHG RX REV CODE 258: Performed by: HOSPITALIST

## 2017-07-01 PROCEDURE — 94640 AIRWAY INHALATION TREATMENT: CPT

## 2017-07-01 PROCEDURE — 83735 ASSAY OF MAGNESIUM: CPT

## 2017-07-01 PROCEDURE — A9270 NON-COVERED ITEM OR SERVICE: HCPCS | Performed by: INTERNAL MEDICINE

## 2017-07-01 PROCEDURE — 36600 WITHDRAWAL OF ARTERIAL BLOOD: CPT

## 2017-07-01 PROCEDURE — 99232 SBSQ HOSP IP/OBS MODERATE 35: CPT | Performed by: HOSPITALIST

## 2017-07-01 PROCEDURE — 700111 HCHG RX REV CODE 636 W/ 250 OVERRIDE (IP): Performed by: HOSPITALIST

## 2017-07-01 PROCEDURE — 99291 CRITICAL CARE FIRST HOUR: CPT | Performed by: INTERNAL MEDICINE

## 2017-07-01 PROCEDURE — 84478 ASSAY OF TRIGLYCERIDES: CPT

## 2017-07-01 PROCEDURE — 85025 COMPLETE CBC W/AUTO DIFF WBC: CPT

## 2017-07-01 PROCEDURE — 71010 DX-CHEST-PORTABLE (1 VIEW): CPT

## 2017-07-01 PROCEDURE — 94150 VITAL CAPACITY TEST: CPT

## 2017-07-01 PROCEDURE — 80048 BASIC METABOLIC PNL TOTAL CA: CPT

## 2017-07-01 PROCEDURE — 700111 HCHG RX REV CODE 636 W/ 250 OVERRIDE (IP): Performed by: INTERNAL MEDICINE

## 2017-07-01 PROCEDURE — 700101 HCHG RX REV CODE 250: Performed by: INTERNAL MEDICINE

## 2017-07-01 PROCEDURE — 700105 HCHG RX REV CODE 258

## 2017-07-01 PROCEDURE — A9270 NON-COVERED ITEM OR SERVICE: HCPCS | Performed by: HOSPITALIST

## 2017-07-01 PROCEDURE — 770022 HCHG ROOM/CARE - ICU (200)

## 2017-07-01 PROCEDURE — 82803 BLOOD GASES ANY COMBINATION: CPT

## 2017-07-01 PROCEDURE — 94003 VENT MGMT INPAT SUBQ DAY: CPT

## 2017-07-01 PROCEDURE — 700102 HCHG RX REV CODE 250 W/ 637 OVERRIDE(OP): Performed by: HOSPITALIST

## 2017-07-01 PROCEDURE — 84132 ASSAY OF SERUM POTASSIUM: CPT

## 2017-07-01 RX ORDER — SODIUM CHLORIDE 9 MG/ML
INJECTION, SOLUTION INTRAVENOUS
Status: COMPLETED
Start: 2017-07-01 | End: 2017-07-01

## 2017-07-01 RX ADMIN — IPRATROPIUM BROMIDE AND ALBUTEROL SULFATE 3 ML: .5; 3 SOLUTION RESPIRATORY (INHALATION) at 13:49

## 2017-07-01 RX ADMIN — PROPOFOL 10 MCG/KG/MIN: 10 INJECTION, EMULSION INTRAVENOUS at 04:03

## 2017-07-01 RX ADMIN — PIPERACILLIN SODIUM AND TAZOBACTAM SODIUM 3.38 G: 3; .375 INJECTION, POWDER, FOR SOLUTION INTRAVENOUS at 14:25

## 2017-07-01 RX ADMIN — OMEPRAZOLE 40 MG: 20 CAPSULE, DELAYED RELEASE ORAL at 21:00

## 2017-07-01 RX ADMIN — PIPERACILLIN SODIUM AND TAZOBACTAM SODIUM 3.38 G: 3; .375 INJECTION, POWDER, FOR SOLUTION INTRAVENOUS at 20:19

## 2017-07-01 RX ADMIN — POTASSIUM CHLORIDE 40 MEQ: 1.5 POWDER, FOR SOLUTION ORAL at 20:20

## 2017-07-01 RX ADMIN — IPRATROPIUM BROMIDE AND ALBUTEROL SULFATE 3 ML: .5; 3 SOLUTION RESPIRATORY (INHALATION) at 10:19

## 2017-07-01 RX ADMIN — POTASSIUM CHLORIDE 40 MEQ: 1.5 POWDER, FOR SOLUTION ORAL at 08:04

## 2017-07-01 RX ADMIN — FUROSEMIDE 40 MG: 10 INJECTION, SOLUTION INTRAMUSCULAR; INTRAVENOUS at 08:03

## 2017-07-01 RX ADMIN — FUROSEMIDE 40 MG: 10 INJECTION, SOLUTION INTRAMUSCULAR; INTRAVENOUS at 20:19

## 2017-07-01 RX ADMIN — CHLORHEXIDINE GLUCONATE 15 ML: 1.2 RINSE ORAL at 20:38

## 2017-07-01 RX ADMIN — POTASSIUM CHLORIDE 40 MEQ: 1.5 POWDER, FOR SOLUTION ORAL at 14:25

## 2017-07-01 RX ADMIN — SENNOSIDES AND DOCUSATE SODIUM 2 TABLET: 8.6; 5 TABLET ORAL at 08:04

## 2017-07-01 RX ADMIN — IPRATROPIUM BROMIDE AND ALBUTEROL SULFATE 3 ML: .5; 3 SOLUTION RESPIRATORY (INHALATION) at 03:30

## 2017-07-01 RX ADMIN — OMEPRAZOLE 40 MG: 20 CAPSULE, DELAYED RELEASE ORAL at 08:04

## 2017-07-01 RX ADMIN — CHLORHEXIDINE GLUCONATE 15 ML: 1.2 RINSE ORAL at 12:16

## 2017-07-01 RX ADMIN — THERA TABS 1 TABLET: TAB at 08:04

## 2017-07-01 RX ADMIN — PIPERACILLIN SODIUM AND TAZOBACTAM SODIUM 3.38 G: 3; .375 INJECTION, POWDER, FOR SOLUTION INTRAVENOUS at 08:04

## 2017-07-01 RX ADMIN — FOLIC ACID 1 MG: 1 TABLET ORAL at 08:04

## 2017-07-01 RX ADMIN — Medication 100 MG: at 08:04

## 2017-07-01 RX ADMIN — SENNOSIDES AND DOCUSATE SODIUM 2 TABLET: 8.6; 5 TABLET ORAL at 20:20

## 2017-07-01 RX ADMIN — PIPERACILLIN SODIUM AND TAZOBACTAM SODIUM 3.38 G: 3; .375 INJECTION, POWDER, FOR SOLUTION INTRAVENOUS at 04:02

## 2017-07-01 RX ADMIN — SODIUM CHLORIDE 500 ML: 9 INJECTION, SOLUTION INTRAVENOUS at 22:43

## 2017-07-01 RX ADMIN — IPRATROPIUM BROMIDE AND ALBUTEROL SULFATE 3 ML: .5; 3 SOLUTION RESPIRATORY (INHALATION) at 06:48

## 2017-07-01 RX ADMIN — IPRATROPIUM BROMIDE AND ALBUTEROL SULFATE 3 ML: .5; 3 SOLUTION RESPIRATORY (INHALATION) at 21:50

## 2017-07-01 RX ADMIN — IPRATROPIUM BROMIDE AND ALBUTEROL SULFATE 3 ML: .5; 3 SOLUTION RESPIRATORY (INHALATION) at 19:02

## 2017-07-01 RX ADMIN — OXYCODONE HYDROCHLORIDE 5 MG: 5 TABLET ORAL at 12:16

## 2017-07-01 RX ADMIN — FUROSEMIDE 40 MG: 10 INJECTION, SOLUTION INTRAMUSCULAR; INTRAVENOUS at 14:25

## 2017-07-01 RX ADMIN — PROPOFOL 10 MCG/KG/MIN: 10 INJECTION, EMULSION INTRAVENOUS at 18:16

## 2017-07-01 ASSESSMENT — LIFESTYLE VARIABLES: DO YOU DRINK ALCOHOL: YES

## 2017-07-01 ASSESSMENT — PULMONARY FUNCTION TESTS
FVC: .85
FVC: 1.3

## 2017-07-01 NOTE — CARE PLAN
Problem: Bronchoconstriction:  Goal: Improve in air movement and diminished wheezing  DUO Q4    Problem: Ventilation Defect:  Goal: Ability to achieve and maintain unassisted ventilation or tolerate decreased levels of ventilator support  Adult Ventilation Update    Total Vent Days: 9  RR20/Vt460/PEEP 8/40%      8.0@24             Patient Lines/Drains/Airways Status    Active Airway      Name: Placement date: Placement time: Site: Days:     Airway Group ET Tube 8.0 06/24/17 1845    6                 In the last 24 hours, the patient tolerated SBT for 1.5hours on settings of 5/8.    #FVC / Vital Capacity (liters) : 0.95 (06/30/17 1245)  NIF (cm H2O) :  (unable to obtain.) (06/30/17 2049)  Rapid Shallow Breathing Index (RR/VT): 50 (06/30/17 2049)  Plateau Pressure (Q Shift): 20 (06/30/17 1907)  Static Compliance (ml / cm H2O): 34.6 (07/01/17 0328)    Barriers to SBT Weaning Trial Stopped due to:: Pt weaned for 1 hour and returned to rest settings per protocol (06/30/17 2049)  Length of Weaning Trial Length of Weaning Trial (Hours): 1.5 (06/30/17 2049)    Sputum/Suction   Cough: Productive (07/01/17 0328)  Sputum Amount: Large (07/01/17 0328)  Sputum Color: Clear (07/01/17 0328)  Sputum Consistency: Thick (07/01/17 0328)    Mobility Group  Activity Performed: Edge of bed (07/01/17 0000)  Time Activity Tolerated: 2 min (07/01/17 0000)  Assistance / Tolerance: Assistance of Two or More (07/01/17 0000)  Pt Calls for Assistance: No (07/01/17 0000)  Staff Present for Mobilization: RN;CNA (07/01/17 0000)  Assistive Devices: Hand held assist (07/01/17 0000)  Reason Not Mobilized: Other (comment) (06/29/17 0800)  Mobilization Comments:  (RASS -3) (06/29/17 0800)    Events/Summary/Plan: placed pt back on full support. tolerated SPONT 5/8 for 1.5hours (06/30/17 2049)

## 2017-07-01 NOTE — CARE PLAN
Problem: Ventilation Defect:  Goal: Ability to achieve and maintain unassisted ventilation or tolerate decreased levels of ventilator support  Intervention: Support and monitor invasive and noninvasive mechanical ventilation  Adult Ventilation Update    Total Vent Days: 8  Vent:  x 20, 30% +8      Patient Lines/Drains/Airways Status    Active Airway      Name: Placement date: Placement time: Site: Days:     Airway Group ET Tube 8.0 06/24/17  1845    8                 Mobility Group  Activity Performed: Edge of bed     Events/Summary/Plan: Patient stable on vent. Tolerated SBT  for 4 hours well. FVC 0.95L.

## 2017-07-01 NOTE — CARE PLAN
Problem: Communication  Goal: The ability to communicate needs accurately and effectively will improve  Outcome: PROGRESSING AS EXPECTED  Pt sedation vacation done today, pt able to follow commands is alert to himself, recognizes family.     Problem: Safety  Goal: Will remain free from injury  Outcome: PROGRESSING AS EXPECTED  Explained to the pt the importance of the restraints.    Problem: Venous Thromboembolism (VTW)/Deep Vein Thrombosis (DVT) Prevention:  Goal: Patient will participate in Venous Thrombosis (VTE)/Deep Vein Thrombosis (DVT)Prevention Measures  Outcome: PROGRESSING AS EXPECTED    Problem: Mobility  Goal: Risk for activity intolerance will decrease  Outcome: PROGRESSING AS EXPECTED  Pt doesn't like to sit on the edge of the bed, actually pushes back against you.  Today pt nodded and stayed more aware and not pushing back...  Towards the end he wanted to just lie back down in bed.

## 2017-07-01 NOTE — PROGRESS NOTES
Pulmonary Critical Care Progress Note        Date of Service: 7/1/2017      Chief Complaint: GLF and ETOH    History of Present Illness:   This is a 77-year-old male admitted to the ICU through the ED on 6/23/17 for acute alcohol intoxication and ground level fall. Patient intubated in the ED.     ROS:  Respiratory: unable to perform due to the patient's inability to effectively communicate, Cardiac: unable to perform due to the patient's inability to effectively communicate, GI: unable to perform due to the patient's inability to effectively communicate.  All other systems negative.    Interval Events:  24 hour interval history reviewed   More alert and following commands.   SR 70-80, /52  Tmax 37.8 overnight.   Cortrak in place, TF at goal  Large BM.   Agitated last night with mobilization to edge of bed, but improved agitation today.  Vent day 9.   20, 460 PEEP of 8, 409%,   SBT 1 hour this morning, followed well   nif -22, RSBI 31, VC of 0.85. Small amount of thick tan secretions with strong cough  7/10 of Zosyn. WBC at 9.   Lasix TID.     CXR: improvement to bilateral pulmonary edema.     Plan:    Wean off Fentanyl    PFSH:  No change.     Physical Exam:  General:  Sleepy, but wakes and nods yes and no, no distress.   HEENT:  PERRL, EOMI, conjunctiva pink, sclera anicteric, Significant improved periorbital swelling to right eye.   CVS:  Regular rate and rhythm.  Respiratory:  PIP 21, clear throughout.   Abdomen:  Distended, BS present, soft, NT, no masses.   Extremities:  Moving all extremities, 2+ DPP, no pedal edema.  Neuro/Psych:  Moves all spontaneously, following simple commands slowly.     Respiratory:  Carbajal Vent Mode: APVCMV, Rate (breaths/min): 20, Vt Target (mL): 460, PEEP/CPAP: 8, FiO2: 40, Static Compliance (ml / cm H2O): 34.6, Control VTE (exp VT): 483  Pulse Oximetry: 98 %  ImagingAvailable data reviewed   Recent Labs      06/29/17   0444  06/30/17   0451   ISTATAPH  7.528*  7.448    ISTATAPCO2  34.1  42.0*   ISTATAPO2  64  59*   ISTATATCO2  29  30   [id number]  94  91*   ISTATARTHCO3  28.4*  29.1*   ISTATARTBE  5*  5*   ISTATTEMP  37.0 C  37.9 C   ISTATFIO2  30  30   ISTATSPEC  Arterial  Arterial   ISTATAPHTC  7.528*  7.434   [id number]  64  63*     HemoDynamics:  Pulse: 83, Heart Rate (Monitored): 87  NIBP: 118/53 mmHg     Imaging: Available data reviewed   Maintenance fluid 10    Neuro:  GCS Total Donny Coma Score: 11  Imaging: Available data reviewed   Propofol 10  Fentanyl 50     Fluids:  Intake/Output       06/29/17 0700 - 06/30/17 0659 06/30/17 0700 - 07/01/17 0659 07/01/17 0700 - 07/02/17 0659      5772-6192 9633-6082 Total 1236-3105 6644-9174 Total 5780-5646 1074-3921 Total       Intake    I.V.  568.1  682 1250.1  607.2  337 944.2  --  -- --    Propofol Volume 136.1 150 286.1 105.2 65 170.2 -- -- --    Fentanyl  12 12 24 12 12 24 -- -- --    IV Volume (Normal saline) 120 120 240 90 60 150 -- -- --    IV Volume (IVPB) 300 400 700 400 200 600 -- -- --    Other  60  -- 60  110  -- 110  --  -- --    Medications (P.O./ Enteral Liquids) 60 -- 60 110 -- 110 -- -- --    Enteral  610  780 1390  660  750 1410  --  -- --    Enteral Volume   -- -- --    Free Water / Tube Flush 60 180 240 60 150 210 -- -- --    Total Intake 1238.1 1462 2700.1 1377.2 1087 2464.2 -- -- --       Output    Urine  4380  970 5350  2715  -- 2715  --  -- --    Indwelling Cathether 4380 970 5350 2715 -- 2715 -- -- --    Stool  --  -- --  --  -- --  --  -- --    Number of Times Stooled -- 1 x 1 x -- -- -- -- -- --    Total Output 4380 970 5350 2715 -- 2715 -- -- --       Net I/O     -3141.9 492 -2649.9 -1337.8 1087 -250.8 -- -- --           Recent Labs      06/28/17   1715   06/29/17   0520   06/30/17   0610  06/30/17   1205  06/30/17   1824  07/01/17   0100   SODIUM  141   --   138   --   138   --    --    --    POTASSIUM  3.5*   < >  3.6   < >  3.7  3.7  3.8  4.1   CHLORIDE  106   --   104   --    103   --    --    --    CO2  28   --   26   --   29   --    --    --    BUN  20   --   20   --   23*   --    --    --    CREATININE  0.55   --   0.54   --   0.64   --    --    --    CALCIUM  8.2*   --   8.0*   --   8.2*   --    --    --     < > = values in this interval not displayed.     GI/Nutrition:  Imaging: Available data reviewed  NPO     Liver Function  Recent Labs      17   1715  17   0520  17   0610   GLUCOSE  138*  141*  154*     Heme:  Recent Labs      17   0520  17   0610   RBC  2.61*  2.57*   HEMOGLOBIN  8.4*  8.3*   HEMATOCRIT  25.5*  25.0*   PLATELETCT  160*  196     Infectious Disease:  Temp  Av.6 °C (99.7 °F)  Min: 37 °C (98.6 °F)  Max: 37.8 °C (100 °F)  Micro: reviewed   Recent Labs      17   0520  17   0610   WBC  6.4  7.6   NEUTSPOLYS  77.30*  77.80*   LYMPHOCYTES  8.70*  10.10*   MONOCYTES  10.70  9.60   EOSINOPHILS  2.20  1.30   BASOPHILS  0.30  0.30     Current Facility-Administered Medications   Medication Dose Frequency Provider Last Rate Last Dose   • furosemide (LASIX) injection 40 mg  40 mg TID Melida Baeza M.D.   40 mg at 17   • potassium chloride (KLOR-CON) 20 MEQ packet 40 mEq  40 mEq TID Melida Baeza M.D.   40 mEq at 17   • fentaNYL (SUBLIMAZE) 50 mcg/mL in 50mL (Continuous Infusion)  50 mcg/hr Continuous eMlida Baeza M.D. 1 mL/hr at 17 50 mcg/hr at 17   • omeprazole 2 mg/mL in sodium bicarbonate (PRILOSEC) oral susp 40 mg  40 mg Q12HRS Pedro Seay D.O.   40 mg at 17   • piperacillin-tazobactam (ZOSYN) 3.375 g in  mL IVPB  3.375 g Q6HRS Avelino Giron M.D. 200 mL/hr at 17 3.375 g at 17   • oxycodone immediate-release (ROXICODONE) tablet 5 mg  5 mg Q4HRS PRN Gunnar Bryan M.D.       • oxycodone immediate release (ROXICODONE) tablet 10 mg  10 mg Q4HRS PRN Gunnar Bryan M.D.       • lorazepam (ATIVAN) injection 1-2 mg  1-2 mg Q4HRS PRN  Gunnar Bryan M.D.   1 mg at 06/24/17 1248   • Respiratory Care per Protocol   Continuous RT Gunnar Bryan M.D.       • MD ALERT...Adult ICU Electrolyte Replacement per Pharmacy Protocol   pharmacy to dose Gunnar Bryan M.D.       • fentaNYL (SUBLIMAZE) injection 25 mcg  25 mcg Q HOUR PRN Gunnar Bryan M.D.        Or   • fentaNYL (SUBLIMAZE) injection 50 mcg  50 mcg Q HOUR PRN Gunnar Bryan M.D.        Or   • fentaNYL (SUBLIMAZE) injection 100 mcg  100 mcg Q HOUR PRN Gunnar Bryan M.D.   100 mcg at 06/29/17 2017   • ipratropium-albuterol (DUONEB) nebulizer solution 3 mL  3 mL Q2HRS PRN (RT) Gunnar Bryan M.D.       • ipratropium-albuterol (DUONEB) nebulizer solution 3 mL  3 mL Q4HRS (RT) Gunnar Bryan M.D.   3 mL at 07/01/17 0330   • acetaminophen (TYLENOL) tablet 650 mg  650 mg Q4HRS PRN Pedro Vivas M.D.   650 mg at 06/27/17 1543   • acetaminophen (TYLENOL) suppository 650 mg  650 mg Q6HRS PRN Pedro Vivas M.D.   650 mg at 06/24/17 1955   • glucose 4 g chewable tablet 16 g  16 g Q15 MIN PRN Kaya Gonzalez, PHARMD       • dextrose 50% (D50W) injection 25 mL  25 mL Q15 MIN PRN Kaya Gonzalez, KENDALLD       • thiamine (THIAMINE) tablet 100 mg  100 mg DAILY Tunde Panchal M.D.   100 mg at 06/30/17 0902   • folic acid (FOLVITE) tablet 1 mg  1 mg DAILY Tunde Panchal M.D.   1 mg at 06/30/17 0902   • multivitamin (THERAGRAN) tablet 1 Tab  1 Tab DAILY Pedro Vivas M.D.   1 Tab at 06/30/17 0902   • senna-docusate (PERICOLACE or SENOKOT S) 8.6-50 MG per tablet 2 Tab  2 Tab BID Pedro Vivas M.D.   2 Tab at 06/30/17 2118    And   • polyethylene glycol/lytes (MIRALAX) PACKET 1 Packet  1 Packet QDAY PRN Pedro Vivas M.D.        And   • magnesium hydroxide (MILK OF MAGNESIA) suspension 30 mL  30 mL QDAY PRN Pedro Vivas M.D.        And   • bisacodyl (DULCOLAX) suppository 10 mg  10 mg QDAY PRN Pedro Vivas M.D.       • chlorhexidine  (PERIDEX) 0.12 % solution 15 mL  15 mL BID Pedro Vivas M.D.   15 mL at 06/30/17 2117   • lidocaine (XYLOCAINE) 1%  injection  1-2 mL Q30 MIN PRN Pedro Vivas M.D.       • propofol (DIPRIVAN) injection  5-80 mcg/kg/min Continuous Pedro Vivas M.D. 5 mL/hr at 07/01/17 0403 10 mcg/kg/min at 07/01/17 0403     Last reviewed on 6/23/2017  7:57 PM by Ángel Bazan R.N.    Quality  Measures:  Labs reviewed, Medications reviewed and Radiology images reviewed  Herring catheter: Critically Ill - Requiring Accurate Measurement of Urinary Output  Central line in place: Need for access and Sepsis      DVT prophylaxis - mechanical: SCDs  Ulcer prophylaxis: Yes  Antibiotics: Treating active infection/contamination beyond 24 hours perioperative coverage    Assessment and Plan:  Acute Hypoxic Respiratory Failure   - trial extubation 6/24 with re-intubation in several hours on 6/24   - back on full vent support   - cont RT/O2 protocols   - s/p bronch with BAL: Pseudomonas aeruginosa and Klebsiella pneumonia   - Cont Zosyn for 10 days   - noted 11L positive fluid balance-->cont forced diuresis and increase Lasix to 40mg TID   - cont SBTs with improving weaning parameters.  Hopeful to extubate in next 1-2 days  Status post ground level fall with facial trauma   - 3 cm laceration to his lip, which was sutured in [place], [place]   - seen by trauma and cleared    - cont narcotics/pain control  Status post aspiration.   - empiric abx; f/u cxr  Acute alcohol intoxication with blood alcohol level of 0.206 in [place] at the time of presentation.   - no obvious withdrawal symptoms noted   - follow/correct lytes   - folate, thiamine, and MVI  Acute blood loss anemia.   - follow HH  Thrombocytopenia   - following  Hyponatremia.   - improved  Presumed diabetes mellitus   - glycemic control    Discussed patient condition and risk of morbidity and/or mortality with RN, RT, Pharmacy, Charge nurse / hot rounds and  hospitalist.    The patient remains critically ill.  Critical care time = 33 minutes in directly providing and coordinating critical care and extensive data review.  No time overlap and excludes procedures.     IRoderick (Scribe), am scribing for, and in the presence of, Melida Baeza M.D  Electronically signed by: Roderick Gaxiola (Scribe), 7/1/2017  IMelida M.D  personally performed the services described in this documentation, as scribed by Roderick Gaxiola in my presence, and it is both accurate and complete.

## 2017-07-01 NOTE — PROGRESS NOTES
[facility] Hospitalist Progress Note    Date of Service: 2017    Chief Complaint  77 y.o. male admitted 2017 with alcohol abuse admitted after fall and intubated in ED.    Interval Problem Update  More awake, eyes are open, following some commands  Good response to lasix, ~5L of urine in last 24h  Tube feeds at goal, +BM last night  He is unable to give interval history as he is intubated    Consultants/Specialty  Pulmonary    Disposition  TBD, wean vent      Review of Systems   Unable to perform ROS: intubated      Physical Exam  Laboratory/Imaging   Hemodynamics  Temp (24hrs), Av.6 °C (99.6 °F), Min:37 °C (98.6 °F), Max:37.8 °C (100 °F)   Temperature: 37.8 °C (100 °F)  Pulse  Av.6  Min: 55  Max: 145 Heart Rate (Monitored): 73  NIBP: 119/53 mmHg      Respiratory  Carbajal Vent Mode: Spont, Rate (breaths/min): 20, PEEP/CPAP: 8, PEEP/CPAP: 8, FiO2: 30, P Peak (PIP): 14, P MEAN: 10   Respiration: 18, Pulse Oximetry: 100 %     Work Of Breathing / Effort: Vented  RUL Breath Sounds: Coarse Crackles, RML Breath Sounds: Diminished, RLL Breath Sounds: Diminished, LION Breath Sounds: Coarse Crackles, LLL Breath Sounds: Diminished    Fluids    Intake/Output Summary (Last 24 hours) at 17 1557  Last data filed at 17 1420   Gross per 24 hour   Intake 2212.92 ml   Output   5605 ml   Net -3392.08 ml       Nutrition  Orders Placed This Encounter   Procedures   • Diet NPO     Standing Status: Standing      Number of Occurrences: 1      Standing Expiration Date:      Order Specific Question:  Type:     Answer:  Now [1]     Order Specific Question:  Restrict to:     Answer:  Strict [1]     Physical Exam   Constitutional: No distress. He is sedated, intubated and restrained.   obese   HENT:   Head: Normocephalic and atraumatic.   Nose: Nose normal.   Face is swollen   Eyes: Conjunctivae are normal. Right eye exhibits no discharge. Left eye exhibits no discharge.   Neck: Neck supple. No tracheal deviation  present.   Cardiovascular: Normal rate, regular rhythm, normal heart sounds and intact distal pulses.    No murmur heard.  Pulmonary/Chest: He is intubated. No respiratory distress.   Equal chest rise and fall  Mechanical breath sounds bilaterally  No overt wheezing     Abdominal: Soft. Bowel sounds are normal. He exhibits no distension. There is no tenderness. There is no guarding.   Musculoskeletal: He exhibits no edema.   Neurological:   Moves all ext with purpose  Follows commands to  bilaterally   Skin: Skin is warm. No rash noted. He is not diaphoretic. No erythema.   Vitals reviewed.      Recent Labs      06/29/17   0520  06/30/17   0610  07/01/17   0530   WBC  6.4  7.6  9.4   RBC  2.61*  2.57*  2.57*   HEMOGLOBIN  8.4*  8.3*  8.4*   HEMATOCRIT  25.5*  25.0*  25.1*   MCV  97.7  97.3  97.7   MCH  32.2  32.3  32.7   MCHC  32.9*  33.2*  33.5*   RDW  50.6*  49.1  49.6   PLATELETCT  160*  196  249   MPV  9.7  9.5  9.5     Recent Labs      06/29/17   0520   06/30/17   0610   06/30/17   1824  07/01/17   0100  07/01/17   0530   SODIUM  138   --   138   --    --    --   140   POTASSIUM  3.6   < >  3.7   < >  3.8  4.1  3.8   CHLORIDE  104   --   103   --    --    --   103   CO2  26   --   29   --    --    --   29   GLUCOSE  141*   --   154*   --    --    --   166*   BUN  20   --   23*   --    --    --   27*   CREATININE  0.54   --   0.64   --    --    --   0.67   CALCIUM  8.0*   --   8.2*   --    --    --   8.4*    < > = values in this interval not displayed.             Recent Labs      07/01/17   0530   TRIGLYCERIDE  195*          Assessment/Plan     Aspiration pneumonitis (CMS-[facility])  Assessment & Plan  Extubated on 6/24 but required re-intubation  BAL with pseudomonas and klebsiella  Complete a course of zosyn    Respiratory failure (CMS-HCC)  Assessment & Plan  Pulmonary consulting  Again more awake  Treat pneumoina  Diuresis  Wean vent as tolerated  Mental status is improving    Alcohol abuse (present on  admission)  Assessment & Plan  Folate, thiamine    Anemia (present on admission)  Assessment & Plan  No sign of acute bleed.  Hemoglobin low but stable  Continue PPi    Hypokalemia  Assessment & Plan  Repleated      Radiology images reviewed, Labs reviewed and Medications reviewed  Herring catheter: Critically Ill - Requiring Accurate Measurement of Urinary Output and Unconscious / Sedated Patient on a Ventilator

## 2017-07-01 NOTE — PROGRESS NOTES
Bedside report received from Gabbi RN. Lines and gtts verified, monitor alarms set appropriately. This RN was told K+ scale was to be ordered orally, but no orders or nursing communication was entered. Discussed with Dr. Baeza, new lab and medications orders entered, see order history and MAR.

## 2017-07-01 NOTE — PROGRESS NOTES
Pt upon first assessment was on spont breathing, tolerating fine.  VSS no c/o pain. Pt alert to himself, nodding and following commands, ROM done.  Pt turned every 2 hours.  Mobility done at 1400, pt tolerated 5 minutes.   He would lay his head on my shoulder and relax and then would get antsy and push back hard.  Pt received 2 doses of lasix and had 2575cc of urine out.   Family into see pt.  No complications with tube feeds, at this time pt has has no BM.  Herring care done.

## 2017-07-01 NOTE — CARE PLAN
Problem: Bowel/Gastric:  Goal: Normal bowel function is maintained or improved  Outcome: PROGRESSING AS EXPECTED  LBM today    Problem: Pain Management  Goal: Pain level will decrease to patient’s comfort goal  Outcome: PROGRESSING AS EXPECTED  Fentanyl april running, see MAR

## 2017-07-02 ENCOUNTER — APPOINTMENT (OUTPATIENT)
Dept: RADIOLOGY | Facility: MEDICAL CENTER | Age: 77
DRG: 163 | End: 2017-07-02
Attending: INTERNAL MEDICINE
Payer: MEDICARE

## 2017-07-02 LAB
ANION GAP SERPL CALC-SCNC: 8 MMOL/L (ref 0–11.9)
BASE EXCESS BLDA CALC-SCNC: 5 MMOL/L (ref -4–3)
BASOPHILS # BLD AUTO: 0.4 % (ref 0–1.8)
BASOPHILS # BLD: 0.04 K/UL (ref 0–0.12)
BODY TEMPERATURE: ABNORMAL DEGREES
BUN SERPL-MCNC: 34 MG/DL (ref 8–22)
CALCIUM SERPL-MCNC: 8.5 MG/DL (ref 8.5–10.5)
CHLORIDE SERPL-SCNC: 104 MMOL/L (ref 96–112)
CO2 BLDA-SCNC: 29 MMOL/L (ref 20–33)
CO2 SERPL-SCNC: 28 MMOL/L (ref 20–33)
CREAT SERPL-MCNC: 0.67 MG/DL (ref 0.5–1.4)
EOSINOPHIL # BLD AUTO: 0.15 K/UL (ref 0–0.51)
EOSINOPHIL NFR BLD: 1.5 % (ref 0–6.9)
ERYTHROCYTE [DISTWIDTH] IN BLOOD BY AUTOMATED COUNT: 49.9 FL (ref 35.9–50)
GFR SERPL CREATININE-BSD FRML MDRD: >60 ML/MIN/1.73 M 2
GLUCOSE SERPL-MCNC: 188 MG/DL (ref 65–99)
HCO3 BLDA-SCNC: 28.2 MMOL/L (ref 17–25)
HCT VFR BLD AUTO: 26.6 % (ref 42–52)
HGB BLD-MCNC: 8.8 G/DL (ref 14–18)
IMM GRANULOCYTES # BLD AUTO: 0.1 K/UL (ref 0–0.11)
IMM GRANULOCYTES NFR BLD AUTO: 1 % (ref 0–0.9)
LYMPHOCYTES # BLD AUTO: 1.13 K/UL (ref 1–4.8)
LYMPHOCYTES NFR BLD: 11.5 % (ref 22–41)
MCH RBC QN AUTO: 32.6 PG (ref 27–33)
MCHC RBC AUTO-ENTMCNC: 33.1 G/DL (ref 33.7–35.3)
MCV RBC AUTO: 98.5 FL (ref 81.4–97.8)
MONOCYTES # BLD AUTO: 0.62 K/UL (ref 0–0.85)
MONOCYTES NFR BLD AUTO: 6.3 % (ref 0–13.4)
NEUTROPHILS # BLD AUTO: 7.78 K/UL (ref 1.82–7.42)
NEUTROPHILS NFR BLD: 79.3 % (ref 44–72)
NRBC # BLD AUTO: 0 K/UL
NRBC BLD AUTO-RTO: 0 /100 WBC
O2/TOTAL GAS SETTING VFR VENT: 30 %
PCO2 BLDA: 33 MMHG (ref 26–37)
PCO2 TEMP ADJ BLDA: 33.7 MMHG (ref 26–37)
PH BLDA: 7.54 [PH] (ref 7.4–7.5)
PH TEMP ADJ BLDA: 7.53 [PH] (ref 7.4–7.5)
PLATELET # BLD AUTO: 315 K/UL (ref 164–446)
PMV BLD AUTO: 9.5 FL (ref 9–12.9)
PO2 BLDA: 64 MMHG (ref 64–87)
PO2 TEMP ADJ BLDA: 67 MMHG (ref 64–87)
POTASSIUM SERPL-SCNC: 3.9 MMOL/L (ref 3.6–5.5)
RBC # BLD AUTO: 2.7 M/UL (ref 4.7–6.1)
SAO2 % BLDA: 95 % (ref 93–99)
SODIUM SERPL-SCNC: 140 MMOL/L (ref 135–145)
SPECIMEN DRAWN FROM PATIENT: ABNORMAL
WBC # BLD AUTO: 9.8 K/UL (ref 4.8–10.8)

## 2017-07-02 PROCEDURE — A9270 NON-COVERED ITEM OR SERVICE: HCPCS | Performed by: HOSPITALIST

## 2017-07-02 PROCEDURE — 700102 HCHG RX REV CODE 250 W/ 637 OVERRIDE(OP): Performed by: HOSPITALIST

## 2017-07-02 PROCEDURE — 99232 SBSQ HOSP IP/OBS MODERATE 35: CPT | Performed by: HOSPITALIST

## 2017-07-02 PROCEDURE — 700111 HCHG RX REV CODE 636 W/ 250 OVERRIDE (IP): Performed by: INTERNAL MEDICINE

## 2017-07-02 PROCEDURE — 80048 BASIC METABOLIC PNL TOTAL CA: CPT

## 2017-07-02 PROCEDURE — A9270 NON-COVERED ITEM OR SERVICE: HCPCS | Performed by: INTERNAL MEDICINE

## 2017-07-02 PROCEDURE — 99291 CRITICAL CARE FIRST HOUR: CPT | Performed by: INTERNAL MEDICINE

## 2017-07-02 PROCEDURE — 700102 HCHG RX REV CODE 250 W/ 637 OVERRIDE(OP): Performed by: INTERNAL MEDICINE

## 2017-07-02 PROCEDURE — 770022 HCHG ROOM/CARE - ICU (200)

## 2017-07-02 PROCEDURE — 700111 HCHG RX REV CODE 636 W/ 250 OVERRIDE (IP): Performed by: HOSPITALIST

## 2017-07-02 PROCEDURE — 94640 AIRWAY INHALATION TREATMENT: CPT

## 2017-07-02 PROCEDURE — 36600 WITHDRAWAL OF ARTERIAL BLOOD: CPT

## 2017-07-02 PROCEDURE — 700105 HCHG RX REV CODE 258: Performed by: HOSPITALIST

## 2017-07-02 PROCEDURE — 94150 VITAL CAPACITY TEST: CPT

## 2017-07-02 PROCEDURE — 94003 VENT MGMT INPAT SUBQ DAY: CPT

## 2017-07-02 PROCEDURE — 85025 COMPLETE CBC W/AUTO DIFF WBC: CPT

## 2017-07-02 PROCEDURE — 700101 HCHG RX REV CODE 250: Performed by: INTERNAL MEDICINE

## 2017-07-02 PROCEDURE — 71010 DX-CHEST-PORTABLE (1 VIEW): CPT

## 2017-07-02 PROCEDURE — 82803 BLOOD GASES ANY COMBINATION: CPT

## 2017-07-02 RX ORDER — FAMOTIDINE 20 MG/1
20 TABLET, FILM COATED ORAL 2 TIMES DAILY
Status: DISCONTINUED | OUTPATIENT
Start: 2017-07-02 | End: 2017-07-03

## 2017-07-02 RX ORDER — QUETIAPINE FUMARATE 25 MG/1
25 TABLET, FILM COATED ORAL 3 TIMES DAILY
Status: DISCONTINUED | OUTPATIENT
Start: 2017-07-02 | End: 2017-07-04

## 2017-07-02 RX ADMIN — QUETIAPINE FUMARATE 25 MG: 25 TABLET ORAL at 20:16

## 2017-07-02 RX ADMIN — POTASSIUM CHLORIDE 40 MEQ: 1.5 POWDER, FOR SOLUTION ORAL at 20:19

## 2017-07-02 RX ADMIN — POTASSIUM CHLORIDE 40 MEQ: 1.5 POWDER, FOR SOLUTION ORAL at 15:23

## 2017-07-02 RX ADMIN — FOLIC ACID 1 MG: 1 TABLET ORAL at 09:43

## 2017-07-02 RX ADMIN — IPRATROPIUM BROMIDE AND ALBUTEROL SULFATE 3 ML: .5; 3 SOLUTION RESPIRATORY (INHALATION) at 07:07

## 2017-07-02 RX ADMIN — IPRATROPIUM BROMIDE AND ALBUTEROL SULFATE 3 ML: .5; 3 SOLUTION RESPIRATORY (INHALATION) at 19:07

## 2017-07-02 RX ADMIN — PIPERACILLIN SODIUM AND TAZOBACTAM SODIUM 3.38 G: 3; .375 INJECTION, POWDER, FOR SOLUTION INTRAVENOUS at 09:43

## 2017-07-02 RX ADMIN — OMEPRAZOLE 40 MG: 20 CAPSULE, DELAYED RELEASE ORAL at 09:40

## 2017-07-02 RX ADMIN — FUROSEMIDE 40 MG: 10 INJECTION, SOLUTION INTRAMUSCULAR; INTRAVENOUS at 09:47

## 2017-07-02 RX ADMIN — IPRATROPIUM BROMIDE AND ALBUTEROL SULFATE 3 ML: .5; 3 SOLUTION RESPIRATORY (INHALATION) at 22:22

## 2017-07-02 RX ADMIN — IPRATROPIUM BROMIDE AND ALBUTEROL SULFATE 3 ML: .5; 3 SOLUTION RESPIRATORY (INHALATION) at 02:05

## 2017-07-02 RX ADMIN — IPRATROPIUM BROMIDE AND ALBUTEROL SULFATE 3 ML: .5; 3 SOLUTION RESPIRATORY (INHALATION) at 14:38

## 2017-07-02 RX ADMIN — CHLORHEXIDINE GLUCONATE 15 ML: 1.2 RINSE ORAL at 09:36

## 2017-07-02 RX ADMIN — POTASSIUM CHLORIDE 40 MEQ: 1.5 POWDER, FOR SOLUTION ORAL at 09:40

## 2017-07-02 RX ADMIN — THERA TABS 1 TABLET: TAB at 09:43

## 2017-07-02 RX ADMIN — ACETAMINOPHEN 650 MG: 325 TABLET, FILM COATED ORAL at 12:41

## 2017-07-02 RX ADMIN — SENNOSIDES AND DOCUSATE SODIUM 2 TABLET: 8.6; 5 TABLET ORAL at 09:42

## 2017-07-02 RX ADMIN — PIPERACILLIN SODIUM AND TAZOBACTAM SODIUM 3.38 G: 3; .375 INJECTION, POWDER, FOR SOLUTION INTRAVENOUS at 20:16

## 2017-07-02 RX ADMIN — FAMOTIDINE 20 MG: 20 TABLET, FILM COATED ORAL at 20:16

## 2017-07-02 RX ADMIN — FUROSEMIDE 40 MG: 10 INJECTION, SOLUTION INTRAMUSCULAR; INTRAVENOUS at 15:24

## 2017-07-02 RX ADMIN — PIPERACILLIN SODIUM AND TAZOBACTAM SODIUM 3.38 G: 3; .375 INJECTION, POWDER, FOR SOLUTION INTRAVENOUS at 15:24

## 2017-07-02 RX ADMIN — Medication 100 MG: at 09:42

## 2017-07-02 RX ADMIN — FUROSEMIDE 40 MG: 10 INJECTION, SOLUTION INTRAMUSCULAR; INTRAVENOUS at 20:16

## 2017-07-02 RX ADMIN — FENTANYL CITRATE 50 MCG/HR: 50 INJECTION, SOLUTION INTRAMUSCULAR; INTRAVENOUS at 04:20

## 2017-07-02 RX ADMIN — CHLORHEXIDINE GLUCONATE 15 ML: 1.2 RINSE ORAL at 20:16

## 2017-07-02 RX ADMIN — OXYCODONE HYDROCHLORIDE 10 MG: 10 TABLET ORAL at 20:29

## 2017-07-02 RX ADMIN — QUETIAPINE FUMARATE 25 MG: 25 TABLET ORAL at 15:23

## 2017-07-02 RX ADMIN — QUETIAPINE FUMARATE 25 MG: 25 TABLET ORAL at 11:15

## 2017-07-02 RX ADMIN — PROPOFOL 10 MCG/KG/MIN: 10 INJECTION, EMULSION INTRAVENOUS at 08:46

## 2017-07-02 RX ADMIN — PIPERACILLIN SODIUM AND TAZOBACTAM SODIUM 3.38 G: 3; .375 INJECTION, POWDER, FOR SOLUTION INTRAVENOUS at 04:00

## 2017-07-02 RX ADMIN — IPRATROPIUM BROMIDE AND ALBUTEROL SULFATE 3 ML: .5; 3 SOLUTION RESPIRATORY (INHALATION) at 10:49

## 2017-07-02 RX ADMIN — SENNOSIDES AND DOCUSATE SODIUM 2 TABLET: 8.6; 5 TABLET ORAL at 20:16

## 2017-07-02 RX ADMIN — ENOXAPARIN SODIUM 40 MG: 100 INJECTION SUBCUTANEOUS at 11:15

## 2017-07-02 ASSESSMENT — LIFESTYLE VARIABLES: DO YOU DRINK ALCOHOL: YES

## 2017-07-02 ASSESSMENT — PULMONARY FUNCTION TESTS: FVC: 1.5

## 2017-07-02 NOTE — CARE PLAN
Problem: Bronchoconstriction:  Goal: Improve in air movement and diminished wheezing  DUO Q4    Problem: Ventilation Defect:  Goal: Ability to achieve and maintain unassisted ventilation or tolerate decreased levels of ventilator support  Outcome: PROGRESSING AS EXPECTED  Adult Ventilation Update    Total Vent Days: 10  RR20/Vt460/PEEP 8/40%                  8.0@24  Patient Lines/Drains/Airways Status    Active Airway      Name: Placement date: Placement time: Site: Days:     Airway Group ET Tube 8.0 06/24/17 1845    7                 In the last 24 hours, the patient tolerated SBT for 2.5hours on settings of 5/8.    #FVC / Vital Capacity (liters) : 1.3 (07/01/17 2135)  NIF (cm H2O) : -22 (07/01/17 2135)  Rapid Shallow Breathing Index (RR/VT): 19 (07/01/17 2135)  Plateau Pressure (Q Shift): 19 (07/01/17 1859)  Static Compliance (ml / cm H2O): 49.5 (07/02/17 0205)    Patient failed trials because of Barriers to Wean: Other (Comments) (06/24/17 0700)  Barriers to SBT Weaning Trial Stopped due to:: Pt weaned for 1 hour and returned to rest settings per protocol (07/01/17 2141)  Length of Weaning Trial Length of Weaning Trial (Hours): 2.5 (07/01/17 2141)    Sputum/Suction   Cough: Non Productive (07/02/17 0400)  Sputum Amount: Moderate (07/02/17 0400)  Sputum Color: Clear (07/02/17 0400)  Sputum Consistency: Thick;Thin (07/02/17 0400)    Mobility Group  Activity Performed: Edge of bed (07/02/17 0200)  Time Activity Tolerated: 5 min (07/02/17 0200)  Assistance / Tolerance: Assistance of Two or More (07/02/17 0200)  Pt Calls for Assistance: No (07/02/17 0200)  Staff Present for Mobilization: RT;RN;CNA (07/02/17 0200)  Assistive Devices: Hand held assist (07/02/17 0200)  Reason Not Mobilized:  (FAMILY IN ROOM) (07/01/17 1200)  Mobilization Comments: pt pushes against you the entire time (07/01/17 1400)    Events/Summary/Plan: return to rest settings. tolearated 5/8 2.5hours parameters obtained. FVC 1.3, NIF -22 (07/01/17  9847)

## 2017-07-02 NOTE — PROGRESS NOTES
[facility] Hospitalist Progress Note    Date of Service: 2017    Chief Complaint  77 y.o. male admitted 2017 with alcohol abuse admitted after fall and intubated in ED.    Interval Problem Update  More awake, eyes are open, follows commands to move all ext   On fentanyl and propofol, gets agitated when sedation is turned down  Not trying to mouth words or nod   Good diuresis yesterday, -4.6L yesterday  He is unable to provide any interval history as he is intubated    Consultants/Specialty  Pulmonary    Disposition  TBD, wean vent      Review of Systems   Unable to perform ROS: intubated      Physical Exam  Laboratory/Imaging   Hemodynamics  Temp (24hrs), Av.7 °C (99.8 °F), Min:37.4 °C (99.3 °F), Max:37.9 °C (100.2 °F)   Temperature: 37.6 °C (99.7 °F)  Pulse  Av.4  Min: 55  Max: 145 Heart Rate (Monitored): 83  NIBP: (!) 92/49 mmHg      Respiratory  Carbajal Vent Mode: APVCMV, Rate (breaths/min): 20, PEEP/CPAP: 8, PEEP/CPAP: 8, FiO2: 30, P Peak (PIP): 14, P MEAN: 12   Respiration: 20, Pulse Oximetry: 94 %     Work Of Breathing / Effort: Vented  RUL Breath Sounds: Diminished, RML Breath Sounds: Diminished, RLL Breath Sounds: Diminished, LION Breath Sounds: Diminished, LLL Breath Sounds: Diminished    Fluids    Intake/Output Summary (Last 24 hours) at 17 0957  Last data filed at 17 0800   Gross per 24 hour   Intake 2311.5 ml   Output   4665 ml   Net -2353.5 ml       Nutrition  Orders Placed This Encounter   Procedures   • Diet NPO     Standing Status: Standing      Number of Occurrences: 1      Standing Expiration Date:      Order Specific Question:  Type:     Answer:  Now [1]     Order Specific Question:  Restrict to:     Answer:  Strict [1]     Physical Exam   Constitutional: No distress. He is sedated, intubated and restrained.   obese   HENT:   Head: Normocephalic and atraumatic.   Nose: Nose normal.   Face is swollen   Eyes: Conjunctivae are normal. Right eye exhibits no discharge. Left  eye exhibits no discharge.   Neck: Neck supple. No tracheal deviation present.   Cardiovascular: Normal rate, regular rhythm, normal heart sounds and intact distal pulses.    No murmur heard.  Pulmonary/Chest: He is intubated. No respiratory distress.   Equal chest rise and fall  Mechanical breath sounds bilaterally  No overt wheezing     Abdominal: Soft. Bowel sounds are normal. He exhibits no distension. There is no tenderness. There is no guarding.   Musculoskeletal: He exhibits no edema.   Neurological: He is alert.   Eyes open, awake  Moves all ext with purpose  Follows commands to  bilaterally   Skin: Skin is warm. No rash noted. He is not diaphoretic. No erythema.   Vitals reviewed.      Recent Labs      06/30/17   0610  07/01/17   0530  07/02/17   0510   WBC  7.6  9.4  9.8   RBC  2.57*  2.57*  2.70*   HEMOGLOBIN  8.3*  8.4*  8.8*   HEMATOCRIT  25.0*  25.1*  26.6*   MCV  97.3  97.7  98.5*   MCH  32.3  32.7  32.6   MCHC  33.2*  33.5*  33.1*   RDW  49.1  49.6  49.9   PLATELETCT  196  249  315   MPV  9.5  9.5  9.5     Recent Labs      06/30/17   0610   07/01/17   0100  07/01/17   0530  07/02/17   0510   SODIUM  138   --    --   140  140   POTASSIUM  3.7   < >  4.1  3.8  3.9   CHLORIDE  103   --    --   103  104   CO2  29   --    --   29  28   GLUCOSE  154*   --    --   166*  188*   BUN  23*   --    --   27*  34*   CREATININE  0.64   --    --   0.67  0.67   CALCIUM  8.2*   --    --   8.4*  8.5    < > = values in this interval not displayed.             Recent Labs      07/01/17   0530   TRIGLYCERIDE  195*          Assessment/Plan     Aspiration pneumonitis (CMS-HCC)  Assessment & Plan  Extubated on 6/24 but required re-intubation  BAL with pseudomonas and klebsiella  Complete a course of zosyn    Respiratory failure (CMS-HCC)  Assessment & Plan  Pulmonary consulting  Again more awake  Treat pneumoina  Diuresis  Wean vent as tolerated  Mental status is improving    Alcohol abuse (present on  admission)  Assessment & Plan  Folate, thiamine    Anemia (present on admission)  Assessment & Plan  No sign of acute bleed.  Hemoglobin low but stable  Continue PPi    Hypokalemia  Assessment & Plan  Supplement with diuresis      Radiology images reviewed, Labs reviewed and Medications reviewed  Herring catheter: Critically Ill - Requiring Accurate Measurement of Urinary Output and Unconscious / Sedated Patient on a Ventilator

## 2017-07-02 NOTE — CARE PLAN
Problem: Fluid Volume:  Goal: Will maintain balanced intake and output  Intervention: Monitor, educate, and encourage compliance with therapeutic intake of liquids  Receiving lasix, 1L+ out per shift      Problem: Pain Management  Goal: Pain level will decrease to patient’s comfort goal  Intervention: Follow pain managment plan developed in collaboration with patient and Interdisciplinary Team  Fentanyl gtt, see MAR

## 2017-07-02 NOTE — PROGRESS NOTES
Pulmonary Critical Care Progress Note        Date of Service: 7/2/2017      Chief Complaint: GLF and ETOH    History of Present Illness:   This is a 77-year-old male admitted to the ICU through the ED on 6/23/17 for acute alcohol intoxication and ground level fall. Patient intubated in the ED.     ROS:  Respiratory: unable to perform due to the patient's inability to effectively communicate, Cardiac: unable to perform due to the patient's inability to effectively communicate, GI: unable to perform due to the patient's inability to effectively communicate.  All other systems negative.    Interval Events:  24 hour interval history reviewed     Healing 3 cm lac to the lip.   On Prop and fentanyl. In sedation vacation becomes restless and agitated.   Alert and oriented to self, follows some commands, good strength.   SR 80, -120  TF at 50 mL, last BM 7/1  1300 mL urine output last night  Sodium at 150.  Mobilized to edge of bed with difficulty.   Vent day 10, Peep of 8, 40%  SBT: 5/8, NIF -30, VC 1.5, RSBI 56.  Not following breathing commands    CXR: decreased pulmonary edema, LLL with atelectasis vs pleural effusion.     Plan:    Drop fentanyl to 25.    Watch free water.    Wean off Prop.    Start Seroquel.   Drop PPI, start pepcid   DVT prophylaxis.    PFSH:  No change.     Physical Exam:  General:  More sleepy compared to yesterday, no distress.   HEENT:  PERRL, EOMI, conjunctiva pink, sclera anicteric, significant improved periorbital swelling to right eye.   CVS:  Regular rate and rhythm.  Respiratory: faint expiratory wheeze, otherwise clear.   Abdomen:  Distended, BS present, soft, NT, no masses.   Extremities:  Moving all extremities, 2+ DPP, no pedal edema.  Neuro/Psych:  Moves all spontaneously, nonverbal, intermittently following simple commands slowly.     Respiratory:  Carbajal Vent Mode: APVCMV, Rate (breaths/min): 20, Vt Target (mL): 460, PEEP/CPAP: 8, FiO2: 30, Static Compliance (ml / cm H2O):  49.5, Control VTE (exp VT): 465  Pulse Oximetry: 97 %  ImagingAvailable data reviewed   Recent Labs      06/30/17   0451  07/01/17   0437  07/02/17   0413   ISTATAPH  7.448  7.491  7.541*   ISTATAPCO2  42.0*  38.1*  33.0   ISTATAPO2  59*  72  64   ISTATATCO2  30  30  29   [id number]  91*  95  95   ISTATARTHCO3  29.1*  29.1*  28.2*   ISTATARTBE  5*  5*  5*   ISTATTEMP  37.9 C  37.7 C  37.5 C   ISTATFIO2  30  40  30   ISTATSPEC  Arterial  Arterial  Arterial   ISTATAPHTC  7.434  7.481  7.533*   [id number]  63*  75  67     HemoDynamics:  Pulse: 77, Heart Rate (Monitored): 78  NIBP: 101/49 mmHg     Imaging: Available data reviewed   Maintenance fluid 10    Neuro:  Total Donny Coma Score: 11  Imaging: Available data reviewed   Propofol 10  Fentanyl 25    Fluids:  Intake/Output       06/30/17 0700 - 07/01/17 0659 07/01/17 0700 - 07/02/17 0659 07/02/17 0700 - 07/03/17 0659      5695-5263 0295-1072 Total 0700-1859 1900-0659 Total 7591-3872 1276-4418 Total       Intake    I.V.  607.2  337 944.2  279.5  270 549.5  --  -- --    Propofol Volume 105.2 65 170.2 67.5 60 127.5 -- -- --    Fentanyl  12 12 24 12 10 22 -- -- --    IV Volume (Normal saline) 90 60 150 -- -- -- -- -- --    IV Volume (IVPB) 400 200 600 200 200 400 -- -- --    Other  110  -- 110  120  -- 120  --  -- --    Medications (P.O./ Enteral Liquids) 110 -- 110 120 -- 120 -- -- --    Enteral  660  780 1440  720  530 1250  --  -- --    Enteral Volume   -- -- --    Free Water / Tube Flush 60 180 240 120 30 150 -- -- --    Total Intake 1377.2 1117 2494.2 1119.5 800 1919.5 -- -- --       Output    Urine  2715  2280 4995  3350  -- 3350  --  -- --    Indwelling Cathether 2715 2280 4995 3350 -- 3350 -- -- --    Drains  --  -- --  0  -- 0  --  -- --    Residual Amount (ml) (Discarded) -- -- -- 0 -- 0 -- -- --    Total Output 2715 2280 4995 3350 -- 3350 -- -- --       Net I/O     -1337.8 -1163 -2500.8 -2230.5 800 -1430.5 -- -- --           Recent  Labs      17   0610   07/01/17   0100  17   0510   SODIUM  138   --    --   140  140   POTASSIUM  3.7   < >  4.1  3.8  3.9   CHLORIDE  103   --    --   103  104   CO2  29   --    --   29  28   BUN  23*   --    --   27*  34*   CREATININE  0.64   --    --   0.67  0.67   MAGNESIUM   --    --    --   2.1   --    CALCIUM  8.2*   --    --   8.4*  8.5    < > = values in this interval not displayed.     GI/Nutrition:  Imaging: Available data reviewed  NPO and tube feed 50     Liver Function  Recent Labs      17   0510   GLUCOSE  154*  166*  188*     Heme:  Recent Labs      17   0510   RBC  2.57*  2.57*  2.70*   HEMOGLOBIN  8.3*  8.4*  8.8*   HEMATOCRIT  25.0*  25.1*  26.6*   PLATELETCT  196  249  315     Infectious Disease:  Temp  Av.7 °C (99.8 °F)  Min: 37.4 °C (99.3 °F)  Max: 37.9 °C (100.2 °F)  Micro: reviewed   Recent Labs      17   0510   WBC  7.6  9.4  9.8   NEUTSPOLYS  77.80*  81.60*  79.30*   LYMPHOCYTES  10.10*  8.80*  11.50*   MONOCYTES  9.60  7.20  6.30   EOSINOPHILS  1.30  1.50  1.50   BASOPHILS  0.30  0.20  0.40     Current Facility-Administered Medications   Medication Dose Frequency Provider Last Rate Last Dose   • furosemide (LASIX) injection 40 mg  40 mg TID Melida Baeza M.D.   40 mg at 17 2019   • potassium chloride (KLOR-CON) 20 MEQ packet 40 mEq  40 mEq TID Melida Baeza M.D.   40 mEq at 17   • fentaNYL (SUBLIMAZE) 50 mcg/mL in 50mL (Continuous Infusion)  50 mcg/hr Continuous Melida Baeza M.D. 1 mL/hr at 17 0420 50 mcg/hr at 17 0420   • omeprazole 2 mg/mL in sodium bicarbonate (PRILOSEC) oral susp 40 mg  40 mg Q12HRS Pedro Seay D.O.   40 mg at 17 2100   • piperacillin-tazobactam (ZOSYN) 3.375 g in  mL IVPB  3.375 g Q6HRS Avelino Giron M.D. 200 mL/hr at 17 0400 3.375 g at 17 0400   •  oxycodone immediate-release (ROXICODONE) tablet 5 mg  5 mg Q4HRS PRN Gunnar Bryan M.D.   5 mg at 07/01/17 1216   • oxycodone immediate release (ROXICODONE) tablet 10 mg  10 mg Q4HRS PRN Gunnar Bryan M.D.       • lorazepam (ATIVAN) injection 1-2 mg  1-2 mg Q4HRS PRN Gunnar Bryan M.D.   1 mg at 06/24/17 1248   • Respiratory Care per Protocol   Continuous RT Gunnar Bryan M.D.       • MD ALERT...Adult ICU Electrolyte Replacement per Pharmacy Protocol   pharmacy to dose Gunnar Bryan M.D.       • fentaNYL (SUBLIMAZE) injection 25 mcg  25 mcg Q HOUR PRN Gunnar Bryan M.D.        Or   • fentaNYL (SUBLIMAZE) injection 50 mcg  50 mcg Q HOUR PRN Gunnar Bryan M.D.        Or   • fentaNYL (SUBLIMAZE) injection 100 mcg  100 mcg Q HOUR PRN Gunnar Bryan M.D.   100 mcg at 06/29/17 2017   • ipratropium-albuterol (DUONEB) nebulizer solution 3 mL  3 mL Q2HRS PRN (RT) Gunnar Bryna M.D.       • ipratropium-albuterol (DUONEB) nebulizer solution 3 mL  3 mL Q4HRS (RT) Gunnar Bryan M.D.   3 mL at 07/02/17 0205   • acetaminophen (TYLENOL) tablet 650 mg  650 mg Q4HRS PRN Pedro Vivas M.D.   650 mg at 06/27/17 1543   • acetaminophen (TYLENOL) suppository 650 mg  650 mg Q6HRS PRN Pedro Vivas M.D.   650 mg at 06/24/17 1955   • glucose 4 g chewable tablet 16 g  16 g Q15 MIN PRN Kaya Gonzalez, KENDALLD       • dextrose 50% (D50W) injection 25 mL  25 mL Q15 MIN PRN Kaya Gonzalez, KENDALLD       • thiamine (THIAMINE) tablet 100 mg  100 mg DAILY Tunde Panchal M.D.   100 mg at 07/01/17 0804   • folic acid (FOLVITE) tablet 1 mg  1 mg DAILY Tunde Panchal M.D.   1 mg at 07/01/17 0804   • multivitamin (THERAGRAN) tablet 1 Tab  1 Tab DAILY Pedro Vivas M.D.   1 Tab at 07/01/17 0804   • senna-docusate (PERICOLACE or SENOKOT S) 8.6-50 MG per tablet 2 Tab  2 Tab BID Pedro Vivas M.D.   2 Tab at 07/01/17 2020    And   • polyethylene glycol/lytes (MIRALAX) PACKET 1 Packet  1 Packet QDAY PRN  Pedro Vivas M.D.        And   • magnesium hydroxide (MILK OF MAGNESIA) suspension 30 mL  30 mL QDAY PRN Pedro Vivas M.D.        And   • bisacodyl (DULCOLAX) suppository 10 mg  10 mg QDAY PRN Pedro Vivas M.D.       • chlorhexidine (PERIDEX) 0.12 % solution 15 mL  15 mL BID Pedro Vivas M.D.   15 mL at 07/01/17 2038   • lidocaine (XYLOCAINE) 1%  injection  1-2 mL Q30 MIN PRN Pedro Vivas M.D.       • propofol (DIPRIVAN) injection  5-80 mcg/kg/min Continuous Pedro Vivas M.D. 10 mL/hr at 07/02/17 0000 20 mcg/kg/min at 07/02/17 0000     Last reviewed on 6/23/2017  7:57 PM by Ángel Bazan R.N.    Quality  Measures:  Labs reviewed, Medications reviewed and Radiology images reviewed  Herring catheter: Critically Ill - Requiring Accurate Measurement of Urinary Output  Central line in place: Need for access and Sepsis      DVT prophylaxis - mechanical: SCDs  Ulcer prophylaxis: Yes  Antibiotics: Treating active infection/contamination beyond 24 hours perioperative coverage    Assessment and Plan:  Acute Hypoxic Respiratory Failure   - trial extubation 6/24 with re-intubation in several hours on 6/24   - back on full vent support   - cont RT/O2 protocols   - s/p bronch with BAL: Pseudomonas aeruginosa and Klebsiella pneumonia   - Cont Zosyn for 10 days   - noted 6L positive fluid balance-->cont forced diuresis of Lasix 40mg TID   - cont SBTs with improving weaning parameters.  Hopeful to extubate in next 1-2 days  Status post ground level fall with facial trauma   - 3 cm laceration to his lip, which was sutured in [place], [place]   - seen by trauma and cleared    - cont narcotics/pain control  Status post aspiration.   - empiric abx; f/u cxr  Acute alcohol intoxication with blood alcohol level of 0.206 in [place] at the time of presentation.   - no obvious withdrawal symptoms noted   - follow/correct lytes   - folate, thiamine, and MVI  Acute blood loss  anemia.   - follow HH  Thrombocytopenia   - following  Hyponatremia.   - improved  Presumed diabetes mellitus   - glycemic control    Discussed patient condition and risk of morbidity and/or mortality with RN, RT, Pharmacy, Charge nurse / hot rounds and hospitalist.    The patient remains critically ill.  Critical care time = 32 minutes in directly providing and coordinating critical care and extensive data review.  No time overlap and excludes procedures.     Roderick SOTOMAYOR (Scribe), am scribing for, and in the presence of, Melida Baeza M.D  Electronically signed by: Roderick Gaxiola (Scribe), 7/2/2017  IMelida M.D  personally performed the services described in this documentation, as scribed by Roderick Gaxiola in my presence, and it is both accurate and complete.

## 2017-07-02 NOTE — CARE PLAN
Problem: Ventilation Defect:  Goal: Ability to achieve and maintain unassisted ventilation or tolerate decreased levels of ventilator support  Outcome: PROGRESSING AS EXPECTED  Pt before 2 SBTs today   Intervention: Monitor ventilator weaning response  1 hour SBT in the morning      1 hour SBT in the afternoon, no parameters performed   Nif      -22  FVC   .85  RSBI   31  Intervention: Manage ventilation therapy by monitoring diagnostic test results  Adult Ventilation Update    Total Vent Days: 9      Patient Lines/Drains/Airways Status    Active Airway      Name: Placement date: Placement time: Site: Days:     Airway Group ET Tube 8.0 06/24/17  1845    9

## 2017-07-03 ENCOUNTER — APPOINTMENT (OUTPATIENT)
Dept: RADIOLOGY | Facility: MEDICAL CENTER | Age: 77
DRG: 163 | End: 2017-07-03
Attending: INTERNAL MEDICINE
Payer: MEDICARE

## 2017-07-03 LAB
ALBUMIN SERPL BCP-MCNC: 3.1 G/DL (ref 3.2–4.9)
ALBUMIN/GLOB SERPL: 0.9 G/DL
ALP SERPL-CCNC: 163 U/L (ref 30–99)
ALT SERPL-CCNC: 35 U/L (ref 2–50)
ANION GAP SERPL CALC-SCNC: 11 MMOL/L (ref 0–11.9)
ANION GAP SERPL CALC-SCNC: 8 MMOL/L (ref 0–11.9)
AST SERPL-CCNC: 19 U/L (ref 12–45)
BASE EXCESS BLDA CALC-SCNC: 5 MMOL/L (ref -4–3)
BASOPHILS # BLD AUTO: 0.6 % (ref 0–1.8)
BASOPHILS # BLD: 0.05 K/UL (ref 0–0.12)
BILIRUB SERPL-MCNC: 0.4 MG/DL (ref 0.1–1.5)
BODY TEMPERATURE: ABNORMAL DEGREES
BUN SERPL-MCNC: 40 MG/DL (ref 8–22)
BUN SERPL-MCNC: 44 MG/DL (ref 8–22)
CALCIUM SERPL-MCNC: 8.9 MG/DL (ref 8.5–10.5)
CALCIUM SERPL-MCNC: 9 MG/DL (ref 8.5–10.5)
CHLORIDE SERPL-SCNC: 104 MMOL/L (ref 96–112)
CHLORIDE SERPL-SCNC: 106 MMOL/L (ref 96–112)
CO2 BLDA-SCNC: 29 MMOL/L (ref 20–33)
CO2 SERPL-SCNC: 27 MMOL/L (ref 20–33)
CO2 SERPL-SCNC: 28 MMOL/L (ref 20–33)
CREAT SERPL-MCNC: 0.79 MG/DL (ref 0.5–1.4)
CREAT SERPL-MCNC: 0.86 MG/DL (ref 0.5–1.4)
CRP SERPL HS-MCNC: 10.01 MG/DL (ref 0–0.75)
EOSINOPHIL # BLD AUTO: 0.22 K/UL (ref 0–0.51)
EOSINOPHIL NFR BLD: 2.6 % (ref 0–6.9)
ERYTHROCYTE [DISTWIDTH] IN BLOOD BY AUTOMATED COUNT: 51.5 FL (ref 35.9–50)
GFR SERPL CREATININE-BSD FRML MDRD: >60 ML/MIN/1.73 M 2
GFR SERPL CREATININE-BSD FRML MDRD: >60 ML/MIN/1.73 M 2
GLOBULIN SER CALC-MCNC: 3.5 G/DL (ref 1.9–3.5)
GLUCOSE SERPL-MCNC: 155 MG/DL (ref 65–99)
GLUCOSE SERPL-MCNC: 184 MG/DL (ref 65–99)
HCO3 BLDA-SCNC: 27.9 MMOL/L (ref 17–25)
HCT VFR BLD AUTO: 26.9 % (ref 42–52)
HGB BLD-MCNC: 8.7 G/DL (ref 14–18)
IMM GRANULOCYTES # BLD AUTO: 0.07 K/UL (ref 0–0.11)
IMM GRANULOCYTES NFR BLD AUTO: 0.8 % (ref 0–0.9)
LYMPHOCYTES # BLD AUTO: 1.15 K/UL (ref 1–4.8)
LYMPHOCYTES NFR BLD: 13.7 % (ref 22–41)
MCH RBC QN AUTO: 31.9 PG (ref 27–33)
MCHC RBC AUTO-ENTMCNC: 32.3 G/DL (ref 33.7–35.3)
MCV RBC AUTO: 98.5 FL (ref 81.4–97.8)
MONOCYTES # BLD AUTO: 0.58 K/UL (ref 0–0.85)
MONOCYTES NFR BLD AUTO: 6.9 % (ref 0–13.4)
NEUTROPHILS # BLD AUTO: 6.32 K/UL (ref 1.82–7.42)
NEUTROPHILS NFR BLD: 75.4 % (ref 44–72)
NRBC # BLD AUTO: 0 K/UL
NRBC BLD AUTO-RTO: 0 /100 WBC
O2/TOTAL GAS SETTING VFR VENT: 30 %
PCO2 BLDA: 34.6 MMHG (ref 26–37)
PCO2 TEMP ADJ BLDA: 34.7 MMHG (ref 26–37)
PH BLDA: 7.51 [PH] (ref 7.4–7.5)
PH TEMP ADJ BLDA: 7.51 [PH] (ref 7.4–7.5)
PLATELET # BLD AUTO: 362 K/UL (ref 164–446)
PMV BLD AUTO: 9.3 FL (ref 9–12.9)
PO2 BLDA: 83 MMHG (ref 64–87)
PO2 TEMP ADJ BLDA: 84 MMHG (ref 64–87)
POTASSIUM SERPL-SCNC: 3.5 MMOL/L (ref 3.6–5.5)
POTASSIUM SERPL-SCNC: 4.2 MMOL/L (ref 3.6–5.5)
PREALB SERPL-MCNC: 20 MG/DL (ref 18–38)
PROT SERPL-MCNC: 6.6 G/DL (ref 6–8.2)
RBC # BLD AUTO: 2.73 M/UL (ref 4.7–6.1)
SAO2 % BLDA: 97 % (ref 93–99)
SODIUM SERPL-SCNC: 140 MMOL/L (ref 135–145)
SODIUM SERPL-SCNC: 144 MMOL/L (ref 135–145)
SPECIMEN DRAWN FROM PATIENT: ABNORMAL
WBC # BLD AUTO: 8.4 K/UL (ref 4.8–10.8)

## 2017-07-03 PROCEDURE — 85025 COMPLETE CBC W/AUTO DIFF WBC: CPT

## 2017-07-03 PROCEDURE — 700111 HCHG RX REV CODE 636 W/ 250 OVERRIDE (IP): Performed by: INTERNAL MEDICINE

## 2017-07-03 PROCEDURE — 36600 WITHDRAWAL OF ARTERIAL BLOOD: CPT

## 2017-07-03 PROCEDURE — 92610 EVALUATE SWALLOWING FUNCTION: CPT

## 2017-07-03 PROCEDURE — 84134 ASSAY OF PREALBUMIN: CPT

## 2017-07-03 PROCEDURE — 94150 VITAL CAPACITY TEST: CPT

## 2017-07-03 PROCEDURE — G8978 MOBILITY CURRENT STATUS: HCPCS | Mod: CL

## 2017-07-03 PROCEDURE — 99232 SBSQ HOSP IP/OBS MODERATE 35: CPT | Performed by: HOSPITALIST

## 2017-07-03 PROCEDURE — 80048 BASIC METABOLIC PNL TOTAL CA: CPT

## 2017-07-03 PROCEDURE — 700105 HCHG RX REV CODE 258: Performed by: HOSPITALIST

## 2017-07-03 PROCEDURE — 94640 AIRWAY INHALATION TREATMENT: CPT

## 2017-07-03 PROCEDURE — A9270 NON-COVERED ITEM OR SERVICE: HCPCS | Performed by: INTERNAL MEDICINE

## 2017-07-03 PROCEDURE — 770022 HCHG ROOM/CARE - ICU (200)

## 2017-07-03 PROCEDURE — 700102 HCHG RX REV CODE 250 W/ 637 OVERRIDE(OP): Performed by: INTERNAL MEDICINE

## 2017-07-03 PROCEDURE — 82803 BLOOD GASES ANY COMBINATION: CPT

## 2017-07-03 PROCEDURE — 97162 PT EVAL MOD COMPLEX 30 MIN: CPT

## 2017-07-03 PROCEDURE — 80053 COMPREHEN METABOLIC PANEL: CPT

## 2017-07-03 PROCEDURE — 700111 HCHG RX REV CODE 636 W/ 250 OVERRIDE (IP): Performed by: HOSPITALIST

## 2017-07-03 PROCEDURE — 86140 C-REACTIVE PROTEIN: CPT

## 2017-07-03 PROCEDURE — 700101 HCHG RX REV CODE 250: Performed by: INTERNAL MEDICINE

## 2017-07-03 PROCEDURE — G8996 SWALLOW CURRENT STATUS: HCPCS | Mod: CL

## 2017-07-03 PROCEDURE — 94003 VENT MGMT INPAT SUBQ DAY: CPT

## 2017-07-03 PROCEDURE — G8979 MOBILITY GOAL STATUS: HCPCS | Mod: CJ

## 2017-07-03 PROCEDURE — 71010 DX-CHEST-PORTABLE (1 VIEW): CPT

## 2017-07-03 PROCEDURE — G8997 SWALLOW GOAL STATUS: HCPCS | Mod: CH

## 2017-07-03 RX ORDER — TIOTROPIUM BROMIDE 18 UG/1
1 CAPSULE ORAL; RESPIRATORY (INHALATION)
Status: DISCONTINUED | OUTPATIENT
Start: 2017-07-03 | End: 2017-07-05

## 2017-07-03 RX ORDER — POTASSIUM CHLORIDE 29.8 MG/ML
40 INJECTION INTRAVENOUS ONCE
Status: DISCONTINUED | OUTPATIENT
Start: 2017-07-03 | End: 2017-07-03

## 2017-07-03 RX ORDER — IPRATROPIUM BROMIDE AND ALBUTEROL SULFATE 2.5; .5 MG/3ML; MG/3ML
3 SOLUTION RESPIRATORY (INHALATION) PRN
Status: DISCONTINUED | OUTPATIENT
Start: 2017-07-03 | End: 2017-07-03

## 2017-07-03 RX ORDER — POTASSIUM CHLORIDE 29.8 MG/ML
40 INJECTION INTRAVENOUS ONCE
Status: COMPLETED | OUTPATIENT
Start: 2017-07-03 | End: 2017-07-03

## 2017-07-03 RX ORDER — POTASSIUM CHLORIDE 1.5 G/1.58G
40 POWDER, FOR SOLUTION ORAL ONCE
Status: DISCONTINUED | OUTPATIENT
Start: 2017-07-03 | End: 2017-07-03

## 2017-07-03 RX ADMIN — FENTANYL CITRATE 100 MCG: 50 INJECTION, SOLUTION INTRAMUSCULAR; INTRAVENOUS at 04:45

## 2017-07-03 RX ADMIN — POTASSIUM CHLORIDE 40 MEQ: 1.5 POWDER, FOR SOLUTION ORAL at 20:07

## 2017-07-03 RX ADMIN — FAMOTIDINE 20 MG: 10 INJECTION INTRAVENOUS at 10:07

## 2017-07-03 RX ADMIN — FENTANYL CITRATE 50 MCG: 50 INJECTION, SOLUTION INTRAMUSCULAR; INTRAVENOUS at 14:09

## 2017-07-03 RX ADMIN — FUROSEMIDE 40 MG: 10 INJECTION, SOLUTION INTRAMUSCULAR; INTRAVENOUS at 20:07

## 2017-07-03 RX ADMIN — QUETIAPINE FUMARATE 25 MG: 25 TABLET ORAL at 20:07

## 2017-07-03 RX ADMIN — LORAZEPAM 2 MG: 2 INJECTION INTRAMUSCULAR; INTRAVENOUS at 18:21

## 2017-07-03 RX ADMIN — IPRATROPIUM BROMIDE AND ALBUTEROL SULFATE 3 ML: .5; 3 SOLUTION RESPIRATORY (INHALATION) at 02:36

## 2017-07-03 RX ADMIN — POTASSIUM CHLORIDE 40 MEQ: 29.8 INJECTION, SOLUTION INTRAVENOUS at 12:21

## 2017-07-03 RX ADMIN — IPRATROPIUM BROMIDE AND ALBUTEROL SULFATE 3 ML: .5; 3 SOLUTION RESPIRATORY (INHALATION) at 07:05

## 2017-07-03 RX ADMIN — OXYCODONE HYDROCHLORIDE 10 MG: 10 TABLET ORAL at 02:08

## 2017-07-03 RX ADMIN — PIPERACILLIN SODIUM AND TAZOBACTAM SODIUM 3.38 G: 3; .375 INJECTION, POWDER, FOR SOLUTION INTRAVENOUS at 14:00

## 2017-07-03 RX ADMIN — CHLORHEXIDINE GLUCONATE 15 ML: 1.2 RINSE ORAL at 08:12

## 2017-07-03 RX ADMIN — PIPERACILLIN SODIUM AND TAZOBACTAM SODIUM 3.38 G: 3; .375 INJECTION, POWDER, FOR SOLUTION INTRAVENOUS at 08:12

## 2017-07-03 RX ADMIN — ENOXAPARIN SODIUM 40 MG: 100 INJECTION SUBCUTANEOUS at 08:12

## 2017-07-03 RX ADMIN — PIPERACILLIN SODIUM AND TAZOBACTAM SODIUM 3.38 G: 3; .375 INJECTION, POWDER, FOR SOLUTION INTRAVENOUS at 02:08

## 2017-07-03 RX ADMIN — FUROSEMIDE 40 MG: 10 INJECTION, SOLUTION INTRAMUSCULAR; INTRAVENOUS at 08:12

## 2017-07-03 RX ADMIN — FAMOTIDINE 20 MG: 10 INJECTION INTRAVENOUS at 20:07

## 2017-07-03 RX ADMIN — FUROSEMIDE 40 MG: 10 INJECTION, SOLUTION INTRAMUSCULAR; INTRAVENOUS at 13:56

## 2017-07-03 RX ADMIN — PIPERACILLIN SODIUM AND TAZOBACTAM SODIUM 3.38 G: 3; .375 INJECTION, POWDER, FOR SOLUTION INTRAVENOUS at 20:08

## 2017-07-03 RX ADMIN — SENNOSIDES AND DOCUSATE SODIUM 2 TABLET: 8.6; 5 TABLET ORAL at 20:07

## 2017-07-03 ASSESSMENT — PAIN SCALES - GENERAL
PAINLEVEL_OUTOF10: 0
PAINLEVEL_OUTOF10: 2
PAINLEVEL_OUTOF10: 2
PAINLEVEL_OUTOF10: 0

## 2017-07-03 ASSESSMENT — COPD QUESTIONNAIRES
DO YOU EVER COUGH UP ANY MUCUS OR PHLEGM?: YES, A FEW DAYS A WEEK OR MONTH
DURING THE PAST 4 WEEKS HOW MUCH DID YOU FEEL SHORT OF BREATH: SOME OF THE TIME
HAVE YOU SMOKED AT LEAST 100 CIGARETTES IN YOUR ENTIRE LIFE: YES
COPD SCREENING SCORE: 6

## 2017-07-03 ASSESSMENT — COGNITIVE AND FUNCTIONAL STATUS - GENERAL
CLIMB 3 TO 5 STEPS WITH RAILING: TOTAL
MOVING FROM LYING ON BACK TO SITTING ON SIDE OF FLAT BED: UNABLE
MOBILITY SCORE: 7
STANDING UP FROM CHAIR USING ARMS: A LOT
MOVING TO AND FROM BED TO CHAIR: UNABLE
SUGGESTED CMS G CODE MODIFIER MOBILITY: CM
TURNING FROM BACK TO SIDE WHILE IN FLAT BAD: UNABLE
WALKING IN HOSPITAL ROOM: TOTAL

## 2017-07-03 ASSESSMENT — LIFESTYLE VARIABLES
EVER_SMOKED: YES
PACK_YEARS: 1
DO YOU DRINK ALCOHOL: YES

## 2017-07-03 ASSESSMENT — GAIT ASSESSMENTS: GAIT LEVEL OF ASSIST: UNABLE TO PARTICIPATE

## 2017-07-03 ASSESSMENT — PULMONARY FUNCTION TESTS: FVC: 1

## 2017-07-03 NOTE — PROGRESS NOTES
Pt today was more drowsy and propofol dc'd and seroquel started 25mg. VSS.   Pt's fentanyl drip 25mcg/hr per Dr. Baeza during rounds.  Pt up to side of bed today for 10+ minutes.  Today he did fine, he didn't push back or throw himself back.  Pt was able to turn without getting stiff and resisting.  Family into see pt for most of the day.,

## 2017-07-03 NOTE — THERAPY
"Speech Language Therapy Clinical Swallow Evaluation completed.  Functional Status: Patient currently NPO and without a source of nutrition, as patient was extubated this AM and pulled his Cortrak, per RN. RN reporting that patient has pulled Cortrak 4 times. Patient only oriented to self and with weak, hoarse vocal quality and significant delay in following simple directives. Patient unable to follow directives for oral motor examination. Lip laceration noted, consistent with previous reports in EMR. Patient consumed PO trials of single ice chips and NTL via tsp. Patient presented with moderate to severe s/sx of oropharyngeal dysphagia, evidenced by frequent coughing, throat clearing, and delayed initiation of swallow trigger of up to 8 seconds. Laryngeal elevation palpated as extremely weak. Patient required frequent oral suction via use of [place] by this clinician. No further trials were tested d/t overt s/sx of aspiration on modified consistencies. At this time, patient appears at high risk for aspiration and does not appear safe for PO intake. Recommend patient continue strict NPO with replacement of Cortrak for supplemental nutrition. RN present for end of evaluation and aware. Charge RN and RN will attempt to reinsert Cortrak, per RN report. SLP to follow. Thank you for the consult.     Recommendations - Diet: Diet / Liquid Recommendation: Continue strict NPO with replacement of Cortrak for supplemental nutrition, Pre-Feeding Trials with SLP Only                          Strategies: To be assessed                           Medication Administration: Medication Administration: Via Gastric Tube  Plan of Care: Will benefit from Speech Therapy 3 times per week  Post-Acute Therapy: Discharge to a transitional care facility for continued skilled therapy services.    See \"Rehab Therapy-Acute\" Patient Summary Report for complete documentation.   "

## 2017-07-03 NOTE — PROGRESS NOTES
Pulmonary Critical Care Progress Note        Date of Service: 7/3/2017      Chief Complaint: GLF and ETOH    History of Present Illness:   This is a 77-year-old male admitted to the ICU through the ED on 6/23/17 for acute alcohol intoxication and ground level fall. Patient intubated in the ED.     ROS:  Respiratory: unable to perform due to the patient's inability to effectively communicate, Cardiac: unable to perform due to the patient's inability to effectively communicate, GI: unable to perform due to the patient's inability to effectively communicate.  All other systems negative.    Interval Events:  24 hour interval history reviewed   Pulled cortrak overnight. To be replaced today.   Neurologically intact but restless. Fent 25.   SR,  systolic   Good UOP with lasix   Mobilizing.   Hour SBT with 42 RSBI, -28 NiF, 1L FVC  BAL with pseudomonas and klebsiella    CXR with left lower lobe atelectasis, elevated left hemidiaphragm, otherwise relatively clear and unchanged.         PFSH:  No change.       Physical Exam:  General:  Arouses; no distress.   HEENT:  PERRL, EOMI, conjunctiva pink, sclera anicteric, trachea midline  CVS:  Regular rate and rhythm.  Respiratory: few coarse crackles; clear.   Abdomen:  Distended, BS present, soft, NT, no masses.   Extremities:  Moving all extremities, 2+ DPP, no pedal edema.  Neuro/Psych:  Moves all ext spontaneously, intermittently following commands       Respiratory:  Carbajal Vent Mode: APVCMV, Rate (breaths/min): 20, Vt Target (mL): 460, PEEP/CPAP: 8, FiO2: 30, P Support: 5, Static Compliance (ml / cm H2O): 39, Control VTE (exp VT): 466  Pulse Oximetry: 98 %  ImagingAvailable data reviewed     Recent Labs      07/01/17   0437  07/02/17   0413  07/03/17   0430   ISTATAPH  7.491  7.541*  7.514*   ISTATAPCO2  38.1*  33.0  34.6   ISTATAPO2  72  64  83   ISTATATCO2  30  29  29   [id number]  95  95  97   ISTATARTHCO3  29.1*  28.2*  27.9*   ISTATARTBE  5*  5*  5*    ISTATTEMP  37.7 C  37.5 C  37.1 C   ISTATFIO2  40  30  30   ISTATSPEC  Arterial  Arterial  Arterial   ISTATAPHTC  7.481  7.533*  7.513*   [id number]  75  67  84     HemoDynamics:  Pulse: 70, Heart Rate (Monitored): 72  Blood Pressure : 101/47 mmHg, NIBP: 121/60 mmHg     Imaging: Available data reviewed       Neuro:  Total Donny Coma Score: 10  Imaging: Available data reviewed       Fluids:  Intake/Output       07/01/17 0700 - 07/02/17 0659 07/02/17 0700 - 07/03/17 0659 07/03/17 0700 - 07/04/17 0659      0700-1859 1900-0659 Total 0700-1859 1900-0659 Total 0700-1859 1900-0659 Total       Intake    I.V.  279.5  292 571.5  365.3  286 651.3  --  -- --    Propofol Volume 67.5 80 147.5 36.3 -- 36.3 -- -- --    Fentanyl  12 12 24 9 6 15 -- -- --    IV Volume (Normal saline) -- -- -- 70 60 130 -- -- --    IV Volume (IVPB) 200 200 400 250 220 470 -- -- --    Other  120  -- 120  360  90 450  --  -- --    Medications (P.O./ Enteral Liquids) 120 -- 120 360 90 450 -- -- --    Enteral  720  780 1500  650  720 1370  --  -- --    Enteral Volume   -- -- --    Free Water / Tube Flush 120 180 300 150 120 270 -- -- --    Total Intake 1119.5 1072 2191.5 1375.3 1096 2471.3 -- -- --       Output    Urine  3350  1290 4640  1800  1225 3025  --  -- --    Indwelling Cathether 3350 1290 4640 1800 1225 3025 -- -- --    Drains  0  -- 0  0  -- 0  --  -- --    Residual Amount (ml) (Discarded) 0 -- 0 0 -- 0 -- -- --    Total Output 3350 1290 4640 1800 1225 3025 -- -- --       Net I/O     -2230.5 -218 -2448.5 -424.7 -129 -553.7 -- -- --        Weight: 79.1 kg (174 lb 6.1 oz)  Recent Labs      07/01/17   0530  07/02/17   0510  07/03/17   0441   SODIUM  140  140  140   POTASSIUM  3.8  3.9  3.5*   CHLORIDE  103  104  104   CO2  29  28  28   BUN  27*  34*  44*   CREATININE  0.67  0.67  0.86   MAGNESIUM  2.1   --    --    CALCIUM  8.4*  8.5  9.0       GI/Nutrition:  Imaging: Available data reviewed  NPO and tube feed 50      Liver Function  Recent Labs      17   0530  17   0510  17   0441   ALTSGPT   --    --   35   ASTSGOT   --    --   19   ALKPHOSPHAT   --    --   163*   TBILIRUBIN   --    --   0.4   PREALBUMIN   --    --   20.0   GLUCOSE  166*  188*  184*     Heme:  Recent Labs      17   0530  17   0510  171   RBC  2.57*  2.70*  2.73*   HEMOGLOBIN  8.4*  8.8*  8.7*   HEMATOCRIT  25.1*  26.6*  26.9*   PLATELETCT  249  315  362     Infectious Disease:  Temp  Av.2 °C (99 °F)  Min: 37 °C (98.6 °F)  Max: 37.4 °C (99.3 °F)  Micro: reviewed     Recent Labs      1710  171   WBC  9.4  9.8  8.4   NEUTSPOLYS  81.60*  79.30*  75.40*   LYMPHOCYTES  8.80*  11.50*  13.70*   MONOCYTES  7.20  6.30  6.90   EOSINOPHILS  1.50  1.50  2.60   BASOPHILS  0.20  0.40  0.60   ASTSGOT   --    --   19   ALTSGPT   --    --   35   ALKPHOSPHAT   --    --   163*   TBILIRUBIN   --    --   0.4     Current Facility-Administered Medications   Medication Dose Frequency Provider Last Rate Last Dose   • quetiapine (SEROQUEL) tablet 25 mg  25 mg TID Melida Baeza M.D.   25 mg at 17   • enoxaparin (LOVENOX) inj 40 mg  40 mg DAILY Melida Baeza M.D.   40 mg at 17 1115   • famotidine (PEPCID) tablet 20 mg  20 mg BID Melida Baeza M.D.   20 mg at 17   • furosemide (LASIX) injection 40 mg  40 mg TID Melida Baeza M.D.   40 mg at 17   • potassium chloride (KLOR-CON) 20 MEQ packet 40 mEq  40 mEq TID Melida Baeza M.D.   40 mEq at 17 2019   • fentaNYL (SUBLIMAZE) 50 mcg/mL in 50mL (Continuous Infusion)  50 mcg/hr Continuous Melida Baeza M.D. 0.5 mL/hr at 17 25 mcg/hr at 17   • piperacillin-tazobactam (ZOSYN) 3.375 g in  mL IVPB  3.375 g Q6HRS Avelino Giron M.D.   Stopped at 178   • oxycodone immediate-release (ROXICODONE) tablet 5 mg  5 mg Q4HRS PRN Gunnar Bryan M.D.   5 mg at  07/01/17 1216   • oxycodone immediate release (ROXICODONE) tablet 10 mg  10 mg Q4HRS PRN Gunnar Bryan M.D.   10 mg at 07/03/17 0208   • lorazepam (ATIVAN) injection 1-2 mg  1-2 mg Q4HRS PRN Gunnar Bryan M.D.   1 mg at 06/24/17 1248   • Respiratory Care per Protocol   Continuous RT Gunnar Bryan M.D.       • MD ALERT...Adult ICU Electrolyte Replacement per Pharmacy Protocol   pharmacy to dose Gunnar Bryan M.D.       • fentaNYL (SUBLIMAZE) injection 25 mcg  25 mcg Q HOUR PRN Gunnar Bryan M.D.        Or   • fentaNYL (SUBLIMAZE) injection 50 mcg  50 mcg Q HOUR PRN Gunnar Bryan M.D.        Or   • fentaNYL (SUBLIMAZE) injection 100 mcg  100 mcg Q HOUR PRN Gunnar Bryan M.D.   100 mcg at 07/03/17 0445   • ipratropium-albuterol (DUONEB) nebulizer solution 3 mL  3 mL Q2HRS PRN (RT) Gunnar Bryan M.D.       • ipratropium-albuterol (DUONEB) nebulizer solution 3 mL  3 mL Q4HRS (RT) Gunnar Bryan M.D.   3 mL at 07/03/17 0705   • acetaminophen (TYLENOL) tablet 650 mg  650 mg Q4HRS PRN Pedro Vivas M.D.   650 mg at 07/02/17 1241   • acetaminophen (TYLENOL) suppository 650 mg  650 mg Q6HRS PRN Pedro Vivas M.D.   650 mg at 06/24/17 1955   • glucose 4 g chewable tablet 16 g  16 g Q15 MIN PRN Kaya Gonzalez, KENDALLD       • dextrose 50% (D50W) injection 25 mL  25 mL Q15 MIN PRN Kaya Gonzalez, KENDALLD       • thiamine (THIAMINE) tablet 100 mg  100 mg DAILY Tunde Panchal M.D.   100 mg at 07/02/17 0942   • folic acid (FOLVITE) tablet 1 mg  1 mg DAILY Tunde Panchal M.D.   1 mg at 07/02/17 0943   • multivitamin (THERAGRAN) tablet 1 Tab  1 Tab DAILY Pedro Vivas M.D.   1 Tab at 07/02/17 0943   • senna-docusate (PERICOLACE or SENOKOT S) 8.6-50 MG per tablet 2 Tab  2 Tab BID Pedro Vivas M.D.   2 Tab at 07/02/17 2016    And   • polyethylene glycol/lytes (MIRALAX) PACKET 1 Packet  1 Packet QDAY PRN Pedro Vivas M.D.        And   • magnesium hydroxide (MILK OF  MAGNESIA) suspension 30 mL  30 mL QDAY PRN Pedro Vivas M.D.        And   • bisacodyl (DULCOLAX) suppository 10 mg  10 mg QDAY PRN Pedro Vivas M.D.       • chlorhexidine (PERIDEX) 0.12 % solution 15 mL  15 mL BID Pedro Vivas M.D.   15 mL at 07/02/17 2016   • lidocaine (XYLOCAINE) 1%  injection  1-2 mL Q30 MIN PRN Pedro Vivas M.D.       • propofol (DIPRIVAN) injection  5-80 mcg/kg/min Continuous Pedro Vivas M.D.   Stopped at 07/02/17 1030     Last reviewed on 6/23/2017  7:57 PM by Ángel Bazan R.N.      Quality  Measures:  Labs reviewed, Medications reviewed and Radiology images reviewed  Herring catheter: Critically Ill - Requiring Accurate Measurement of Urinary Output  Central line in place: Need for access and Sepsis      DVT prophylaxis - mechanical: SCDs  Ulcer prophylaxis: Yes  Antibiotics: Treating active infection/contamination beyond 24 hours perioperative coverage        Assessment and Plan:  Acute Hypoxic Respiratory Failure   - trial extubation 6/24 with re-intubation in several hours on 6/24   - back on full vent support   - cont RT/O2 protocols   - s/p bronch with BAL: Pseudomonas aeruginosa and Klebsiella pneumonia   - Cont Zosyn for 10 days   - noted 6L positive fluid balance-->cont forced diuresis of Lasix 40mg TID   - discussed with RT, RN; OK to extubate today; monitor closely in ICU   Status post ground level fall with facial trauma   - 3 cm laceration to his lip, which was sutured in [place], [place]   - seen by trauma and cleared    - cont narcotics/pain control  Status post aspiration.   - empiric abx; f/u cxr  Acute alcohol intoxication with blood alcohol level of 0.206 in [place] at the time of presentation.   - no obvious withdrawal symptoms noted   - follow/correct lytes   - folate, thiamine, and MVI  Acute blood loss anemia.   - follow HH  Thrombocytopenia   - following  Hyponatremia.   - improved  Presumed diabetes mellitus   -  glycemic control    Discussed patient condition and risk of morbidity and/or mortality with RN, RT, Pharmacy, Charge nurse / hot rounds and hospitalist.    The patient remains critically ill.  Critical care time = 34 minutes in directly providing and coordinating critical care and extensive data review.  No time overlap and excludes procedures.    Carmen SOTOMAYOR (Alison), am scribing for, and in the presence of, Gunnar Bryan M.D.  Electronically signed by: Carmen Maciel (Alison), 7/3/2017  Gunnar SOTOMAYOR M.D. personally performed the services described in this documentation, as scribed by Carmen Maciel in my presence, and it is both accurate and complete.

## 2017-07-03 NOTE — RESPIRATORY CARE
Extubation    Cuff leak noted yes  Stridor present no        O2 (LPM): 5 (06/24/17 4106)     Patient toleration Pt tolerated well  RCP Complete? y  Events/Summary/Plan: weaning parameters WNL. Orders to extubate given by Dr. Bryan. Pt extubated to 4lpm NC.No signs of distress noted at this time.

## 2017-07-03 NOTE — THERAPY
"Physical Therapy Evaluation completed.   Bed Mobility:  Supine to Sit: Moderate Assist  Transfers: Sit to Stand: Moderate Assist  Gait: Level Of Assist: Unable to Participate; see below  Plan of Care: Will benefit from Physical Therapy 3 times per week  Discharge Recommendations: Equipment: Will Continue to Assess for Equipment Needs. See below    Pt presents to PT for risk reduction for LOB and falling as well as impaired endurance and aerobcic capacity associated with deconditioning and medical co-morbidities. Pt's current congitive deficits and confusion are exacerbating his functional impairements and pt generally appears unaware of current deficits and affect on safety and function. Pt was able to demonstrate bed mobility, EOB activity, and sit<>stands with mod A. He is unable to follow more complex and functional commands and appears somewhat apraxic with re: sequencing and is generally confused throughout. Pt will benefit from continued skilled acute PT while here with recommendation of continued skilled PT/placement prior to medical d/c to home given current objective findings, age, I PLOF, environmental barriers, current co-morbidities, and limited ability of social supports to assist with current level of assist needed.     See \"Rehab Therapy-Acute\" Patient Summary Report for complete documentation.     "

## 2017-07-03 NOTE — CARE PLAN
Problem: Bronchoconstriction:  Goal: Improve in air movement and diminished wheezing  DUO Q4    Problem: Ventilation Defect:  Goal: Ability to achieve and maintain unassisted ventilation or tolerate decreased levels of ventilator support  Adult Ventilation Update    Total Vent Days: 11  RR20/Vt460/PEEP 8/30%                  8.0@24  Patient Lines/Drains/Airways Status    Active Airway      Name: Placement date: Placement time: Site: Days:     Airway Group ET Tube 8.0 06/24/17 1845    8                 In the last 24 hours, the patient tolerated SBT for 3.11hours on settings of 5/8.    #FVC / Vital Capacity (liters) : 1.5 (07/02/17 0815)  NIF (cm H2O) : -30 (Msanul) (07/02/17 0815)  Rapid Shallow Breathing Index (RR/VT): 72 (07/02/17 2224)  Plateau Pressure (Q Shift): 17 (07/02/17 1903)  Static Compliance (ml / cm H2O): 62.8 (07/03/17 0233)    Barriers to SBT Weaning Trial Stopped due to:: Pt weaned for 1 hour and returned to rest settings per protocol (07/02/17 2224)  Length of Weaning Trial Length of Weaning Trial (Hours): 3.11 (07/02/17 2224)    Sputum/Suction   Cough: Productive (07/03/17 0000)  Sputum Amount: Moderate (07/03/17 0000)  Sputum Color: Clear (07/03/17 0000)  Sputum Consistency: Thick (07/03/17 0000)    Mobility Group  Activity Performed: Edge of bed (07/03/17 0000)  Time Activity Tolerated: 5 min (07/03/17 0000)  Assistance / Tolerance: Assistance of Two or More;Tolerates Well (07/03/17 0000)  Pt Calls for Assistance: No (07/03/17 0000)  Staff Present for Mobilization: RN (07/03/17 0000)  Assistive Devices: Rails;Hand held assist (07/03/17 0000)  Reason Not Mobilized:  (FAMILY IN ROOM) (07/01/17 1200)  Mobilization Comments: pt pushes against you the entire time (07/01/17 1400)    Events/Summary/Plan: placed pt back on full support (07/02/17 2224)

## 2017-07-03 NOTE — PROGRESS NOTES
Bedside report received from day shift RN. All lines and gtts verified. Vs stable. Patient follows all instructed commands. Call light within reach. Bed alarm on. Restraints in use. Fall precautions in place. Will continue to monitor and assess.

## 2017-07-03 NOTE — CARE PLAN
Problem: Communication  Goal: The ability to communicate needs accurately and effectively will improve  Outcome: PROGRESSING AS EXPECTED  Patient follows all instructed commands   Patient mouths words around ETT tube  Updated on POC   Re orientation provided as needed    Problem: Safety  Goal: Will remain free from injury  Outcome: PROGRESSING AS EXPECTED  Patient participated in edge of bed mobility x5 minutes   Active and passive ROM performed  Slipper socks applied  Call light within reach      Goal: Will remain free from falls  Outcome: PROGRESSING AS EXPECTED  Fall precautions in place

## 2017-07-03 NOTE — CARE PLAN
Problem: Bronchoconstriction:  Goal: Improve in air movement and diminished wheezing  Intervention: Implement inhaled treatments  Duo Q4      Problem: Ventilation Defect:  Goal: Ability to achieve and maintain unassisted ventilation or tolerate decreased levels of ventilator support  Intervention: Support and monitor invasive and noninvasive mechanical ventilation  Adult Ventilation Update    Total Vent Days: 10      Patient Lines/Drains/Airways Status    Active Airway      Name: Placement date: Placement time: Site: Days:     Airway Group ET Tube 8.0 06/24/17 1845    7                 In the last 24 hours, the patient tolerated SBT for 2 on settings of 5/8.    #FVC / Vital Capacity (liters) : 1.5 (07/02/17 0815)  NIF (cm H2O) : -30 (Msanul) (07/02/17 0815)  Rapid Shallow Breathing Index (RR/VT): 52 (07/02/17 0815)  Plateau Pressure (Q Shift): 1 (07/02/17 0707)  Static Compliance (ml / cm H2O): 81 (07/02/17 1049)Cough: Productive (07/02/17 1600)  Sputum Amount: Moderate (07/02/17 1600)  Sputum Color: Clear (07/02/17 1600)  Sputum Consistency: Thick (07/02/17 1600)    Mobility Group  Activity Performed: Edge of bed (07/02/17 1515)  Time Activity Tolerated: 10 min (07/02/17 1515)  Assistance / Tolerance: Assistance of Two or More;Tolerates Well (07/02/17 1515)  Pt Calls for Assistance: No (07/02/17 1515)  Staff Present for Mobilization: RN (RN and Nursing student) (07/02/17 1515)  Assistive Devices: Hand held assist (07/02/17 1515)  Reason Not Mobilized:  (FAMILY IN ROOM) (07/01/17 1200)  Mobilization Comments: pt pushes against you the entire time (07/01/17 1400)    Events/Summary/Plan: Pt tolerated SBT, mentation still an issue, RN notified to keep sedation to a minimum over night with SBT early in the AM and possible extubation.

## 2017-07-04 ENCOUNTER — APPOINTMENT (OUTPATIENT)
Dept: RADIOLOGY | Facility: MEDICAL CENTER | Age: 77
DRG: 163 | End: 2017-07-04
Attending: INTERNAL MEDICINE
Payer: MEDICARE

## 2017-07-04 PROBLEM — E11.9 TYPE 2 DIABETES MELLITUS (HCC): Status: ACTIVE | Noted: 2017-07-04

## 2017-07-04 PROBLEM — G93.40 ENCEPHALOPATHY ACUTE: Status: ACTIVE | Noted: 2017-07-04

## 2017-07-04 LAB
ANION GAP SERPL CALC-SCNC: 9 MMOL/L (ref 0–11.9)
BASOPHILS # BLD AUTO: 0.4 % (ref 0–1.8)
BASOPHILS # BLD: 0.05 K/UL (ref 0–0.12)
BUN SERPL-MCNC: 46 MG/DL (ref 8–22)
CALCIUM SERPL-MCNC: 9.3 MG/DL (ref 8.5–10.5)
CHLORIDE SERPL-SCNC: 105 MMOL/L (ref 96–112)
CO2 SERPL-SCNC: 29 MMOL/L (ref 20–33)
CREAT SERPL-MCNC: 0.75 MG/DL (ref 0.5–1.4)
EOSINOPHIL # BLD AUTO: 0.29 K/UL (ref 0–0.51)
EOSINOPHIL NFR BLD: 2.4 % (ref 0–6.9)
ERYTHROCYTE [DISTWIDTH] IN BLOOD BY AUTOMATED COUNT: 50 FL (ref 35.9–50)
GFR SERPL CREATININE-BSD FRML MDRD: >60 ML/MIN/1.73 M 2
GLUCOSE SERPL-MCNC: 162 MG/DL (ref 65–99)
HCT VFR BLD AUTO: 31 % (ref 42–52)
HGB BLD-MCNC: 10.1 G/DL (ref 14–18)
IMM GRANULOCYTES # BLD AUTO: 0.09 K/UL (ref 0–0.11)
IMM GRANULOCYTES NFR BLD AUTO: 0.8 % (ref 0–0.9)
LYMPHOCYTES # BLD AUTO: 0.87 K/UL (ref 1–4.8)
LYMPHOCYTES NFR BLD: 7.3 % (ref 22–41)
MCH RBC QN AUTO: 32.6 PG (ref 27–33)
MCHC RBC AUTO-ENTMCNC: 32.6 G/DL (ref 33.7–35.3)
MCV RBC AUTO: 100 FL (ref 81.4–97.8)
MONOCYTES # BLD AUTO: 0.68 K/UL (ref 0–0.85)
MONOCYTES NFR BLD AUTO: 5.7 % (ref 0–13.4)
NEUTROPHILS # BLD AUTO: 9.96 K/UL (ref 1.82–7.42)
NEUTROPHILS NFR BLD: 83.4 % (ref 44–72)
NRBC # BLD AUTO: 0 K/UL
NRBC BLD AUTO-RTO: 0 /100 WBC
PLATELET # BLD AUTO: 473 K/UL (ref 164–446)
PMV BLD AUTO: 9 FL (ref 9–12.9)
POTASSIUM SERPL-SCNC: 3.8 MMOL/L (ref 3.6–5.5)
RBC # BLD AUTO: 3.1 M/UL (ref 4.7–6.1)
SODIUM SERPL-SCNC: 143 MMOL/L (ref 135–145)
TRIGL SERPL-MCNC: 226 MG/DL (ref 0–149)
WBC # BLD AUTO: 11.9 K/UL (ref 4.8–10.8)

## 2017-07-04 PROCEDURE — 80048 BASIC METABOLIC PNL TOTAL CA: CPT

## 2017-07-04 PROCEDURE — 700102 HCHG RX REV CODE 250 W/ 637 OVERRIDE(OP): Performed by: INTERNAL MEDICINE

## 2017-07-04 PROCEDURE — 700111 HCHG RX REV CODE 636 W/ 250 OVERRIDE (IP): Performed by: HOSPITALIST

## 2017-07-04 PROCEDURE — 700105 HCHG RX REV CODE 258: Performed by: HOSPITALIST

## 2017-07-04 PROCEDURE — 84478 ASSAY OF TRIGLYCERIDES: CPT

## 2017-07-04 PROCEDURE — A9270 NON-COVERED ITEM OR SERVICE: HCPCS | Performed by: INTERNAL MEDICINE

## 2017-07-04 PROCEDURE — 700111 HCHG RX REV CODE 636 W/ 250 OVERRIDE (IP): Performed by: INTERNAL MEDICINE

## 2017-07-04 PROCEDURE — 770022 HCHG ROOM/CARE - ICU (200)

## 2017-07-04 PROCEDURE — 700102 HCHG RX REV CODE 250 W/ 637 OVERRIDE(OP)

## 2017-07-04 PROCEDURE — 85025 COMPLETE CBC W/AUTO DIFF WBC: CPT

## 2017-07-04 PROCEDURE — 700102 HCHG RX REV CODE 250 W/ 637 OVERRIDE(OP): Performed by: HOSPITALIST

## 2017-07-04 PROCEDURE — A9270 NON-COVERED ITEM OR SERVICE: HCPCS

## 2017-07-04 PROCEDURE — 99233 SBSQ HOSP IP/OBS HIGH 50: CPT | Performed by: HOSPITALIST

## 2017-07-04 PROCEDURE — 71010 DX-CHEST-PORTABLE (1 VIEW): CPT

## 2017-07-04 PROCEDURE — A9270 NON-COVERED ITEM OR SERVICE: HCPCS | Performed by: HOSPITALIST

## 2017-07-04 RX ORDER — POTASSIUM CHLORIDE 1.5 G/1.58G
40 POWDER, FOR SOLUTION ORAL DAILY
Status: DISCONTINUED | OUTPATIENT
Start: 2017-07-05 | End: 2017-07-05

## 2017-07-04 RX ORDER — FUROSEMIDE 10 MG/ML
40 INJECTION INTRAMUSCULAR; INTRAVENOUS
Status: DISCONTINUED | OUTPATIENT
Start: 2017-07-05 | End: 2017-07-05

## 2017-07-04 RX ORDER — FAMOTIDINE 20 MG/1
20 TABLET, FILM COATED ORAL 2 TIMES DAILY
Status: DISCONTINUED | OUTPATIENT
Start: 2017-07-04 | End: 2017-07-09

## 2017-07-04 RX ORDER — QUETIAPINE FUMARATE 25 MG/1
50 TABLET, FILM COATED ORAL 3 TIMES DAILY
Status: DISCONTINUED | OUTPATIENT
Start: 2017-07-04 | End: 2017-07-05

## 2017-07-04 RX ADMIN — POTASSIUM CHLORIDE 40 MEQ: 1.5 POWDER, FOR SOLUTION ORAL at 08:08

## 2017-07-04 RX ADMIN — PIPERACILLIN SODIUM AND TAZOBACTAM SODIUM 3.38 G: 3; .375 INJECTION, POWDER, FOR SOLUTION INTRAVENOUS at 01:43

## 2017-07-04 RX ADMIN — PIPERACILLIN SODIUM AND TAZOBACTAM SODIUM 3.38 G: 3; .375 INJECTION, POWDER, FOR SOLUTION INTRAVENOUS at 19:48

## 2017-07-04 RX ADMIN — THERA TABS 1 TABLET: TAB at 08:07

## 2017-07-04 RX ADMIN — QUETIAPINE FUMARATE 25 MG: 25 TABLET ORAL at 08:07

## 2017-07-04 RX ADMIN — FUROSEMIDE 40 MG: 10 INJECTION, SOLUTION INTRAMUSCULAR; INTRAVENOUS at 08:08

## 2017-07-04 RX ADMIN — FAMOTIDINE 20 MG: 10 INJECTION INTRAVENOUS at 08:08

## 2017-07-04 RX ADMIN — QUETIAPINE FUMARATE 50 MG: 25 TABLET ORAL at 15:41

## 2017-07-04 RX ADMIN — FOLIC ACID 1 MG: 1 TABLET ORAL at 08:07

## 2017-07-04 RX ADMIN — ENOXAPARIN SODIUM 40 MG: 100 INJECTION SUBCUTANEOUS at 08:08

## 2017-07-04 RX ADMIN — Medication 100 MG: at 08:07

## 2017-07-04 RX ADMIN — QUETIAPINE FUMARATE 50 MG: 25 TABLET ORAL at 19:47

## 2017-07-04 RX ADMIN — POTASSIUM CHLORIDE 40 MEQ: 1.5 POWDER, FOR SOLUTION ORAL at 15:41

## 2017-07-04 RX ADMIN — PIPERACILLIN SODIUM AND TAZOBACTAM SODIUM 3.38 G: 3; .375 INJECTION, POWDER, FOR SOLUTION INTRAVENOUS at 08:07

## 2017-07-04 RX ADMIN — PIPERACILLIN SODIUM AND TAZOBACTAM SODIUM 3.38 G: 3; .375 INJECTION, POWDER, FOR SOLUTION INTRAVENOUS at 15:42

## 2017-07-04 RX ADMIN — FAMOTIDINE 20 MG: 20 TABLET, FILM COATED ORAL at 19:47

## 2017-07-04 ASSESSMENT — PAIN SCALES - GENERAL
PAINLEVEL_OUTOF10: 0

## 2017-07-04 NOTE — PROGRESS NOTES
Pulmonary Critical Care Progress Note        Date of Service: 7/4/2017      Chief Complaint: GLF and ETOH    History of Present Illness:   This is a 77-year-old male admitted to the ICU through the ED on 6/23/17 for acute alcohol intoxication and ground level fall. Patient intubated in the ED.     ROS:  Respiratory: unable to perform due to the patient's inability to effectively communicate, Cardiac: unable to perform due to the patient's inability to effectively communicate, GI: unable to perform due to the patient's inability to effectively communicate.  All other systems negative.    Interval Events:  24 hour interval history reviewed   Extubated yesterday, 2L NC currently.   Alert but not oriented.   SR with PVCs, -140 systolic.  TF at 50 with bowel movements  550 cc UOP from [facility] with Lasix TID   CXR with clear lung fields.   Day 10/10 zosyn. BAL with pseudomonas and klebsiella          PFSH:  No change.       Physical Exam:  General:  Off sedation. Intermittently agitated and confused.   HEENT:  PERRL, right nare cortrak in place with TF at 50. Neck is supple and trachea is midline. RIJ CVC is CDI   CVS:  Regular, distant   Respiratory: Coarse crackles, upper airway rhonchi, diminished with no wheezing.   Abdomen:  Soft, NT, bowel sounds present.   Extremities:  Moving all extremities, 2+ DPP, no pedal edema. Distal pulses palpable.   Skin: warm, dry no rash.   Neuro/Psych:  As above, moves all extremities symmetrically, follows intermittently.       Respiratory:     Pulse Oximetry: 94 %  ImagingAvailable data reviewed     Recent Labs      07/02/17   0413  07/03/17   0430   ISTATAPH  7.541*  7.514*   ISTATAPCO2  33.0  34.6   ISTATAPO2  64  83   ISTATATCO2  29  29   [id number]  95  97   ISTATARTHCO3  28.2*  27.9*   ISTATARTBE  5*  5*   ISTATTEMP  37.5 C  37.1 C   ISTATFIO2  30  30   ISTATSPEC  Arterial  Arterial   ISTATAPHTC  7.533*  7.513*   DBCTFSFD5MS  67  84     HemoDynamics:  Pulse: 83, Heart Rate  (Monitored): 83  NIBP: 124/62 mmHg     Imaging: Available data reviewed       Neuro:  Total Donny Coma Score: 14  Imaging: Available data reviewed       Fluids:  Intake/Output       07/02/17 0700 - 07/03/17 0659 07/03/17 0700 - 07/04/17 0659 07/04/17 0700 - 07/05/17 0659      0700-1859 1900-0659 Total 7992-3691 8275-2804 Total 7952-0658 0169-2289 Total       Intake    I.V.  365.3  286 651.3  62  280 342  --  -- --    Propofol Volume 36.3 -- 36.3 -- -- -- -- -- --    Fentanyl  9 6 15 2 -- 2 -- -- --    IV Volume (Normal saline) 70 60 130 60 60 120 -- -- --    IV Volume (IVPB) 250 220 470 -- 220 220 -- -- --    Other  360  90 450  --  90 90  --  -- --    Medications (P.O./ Enteral Liquids) 360 90 450 -- 90 90 -- -- --    Enteral  650  720 1370  --  690 690  --  -- --    Enteral Volume  -- 600 600 -- -- --    Free Water / Tube Flush 150 120 270 -- 90 90 -- -- --    Total Intake 1375.3 1096 2471.3 62 1060 1122 -- -- --       Output    Urine  1800  1225 3025  1975  1225 3200  --  -- --    Indwelling Cathether 1800 1225 3025 1975 1225 3200 -- -- --    Drains  0  -- 0  --  -- --  --  -- --    Residual Amount (ml) (Discarded) 0 -- 0 -- -- -- -- -- --    Stool  --  -- --  --  -- --  --  -- --    Number of Times Stooled -- -- -- -- 2 x 2 x -- -- --    Total Output 1800 1225 3025 1975 1225 3200 -- -- --       Net I/O     -424.7 -129 -553.7 -1913 -165 -2078 -- -- --        Weight: 74.7 kg (164 lb 10.9 oz)  Recent Labs      07/03/17 0441 07/03/17   1724  07/04/17   0430   SODIUM  140  144  143   POTASSIUM  3.5*  4.2  3.8   CHLORIDE  104  106  105   CO2  28  27  29   BUN  44*  40*  46*   CREATININE  0.86  0.79  0.75   CALCIUM  9.0  8.9  9.3       GI/Nutrition:  Imaging: Available data reviewed  NPO and tube feed 50     Liver Function  Recent Labs      07/03/17 0441 07/03/17   1724 07/04/17   0430   ALTSGPT  35   --    --    ASTSGOT  19   --    --    ALKPHOSPHAT  163*   --    --    TBILIRUBIN  0.4   --    --     PREALBUMIN  20.0   --    --    GLUCOSE  184*  155*  162*     Heme:  Recent Labs      17   0510  17   0430   RBC  2.70*  2.73*  3.10*   HEMOGLOBIN  8.8*  8.7*  10.1*   HEMATOCRIT  26.6*  26.9*  31.0*   PLATELETCT  315  362  473*     Infectious Disease:  Temp  Av.7 °C (98.1 °F)  Min: 36.4 °C (97.5 °F)  Max: 37 °C (98.6 °F)  Micro: reviewed     Recent Labs      17   0510  07/03/17   0441  07/04/17   0430   WBC  9.8  8.4  11.9*   NEUTSPOLYS  79.30*  75.40*  83.40*   LYMPHOCYTES  11.50*  13.70*  7.30*   MONOCYTES  6.30  6.90  5.70   EOSINOPHILS  1.50  2.60  2.40   BASOPHILS  0.40  0.60  0.40   ASTSGOT   --   19   --    ALTSGPT   --   35   --    ALKPHOSPHAT   --   163*   --    TBILIRUBIN   --   0.4   --      Current Facility-Administered Medications   Medication Dose Frequency Provider Last Rate Last Dose   • famotidine (PEPCID) injection 20 mg  20 mg BID Melida Baeza M.D.   20 mg at 17   • tiotropium (SPIRIVA) 18 MCG inhalation capsule 1 Cap  1 Cap QDAILY (RT) Gunnar Bryan M.D.   Stopped at 17 1545   • quetiapine (SEROQUEL) tablet 25 mg  25 mg TID Melida Baeza M.D.   25 mg at 17   • enoxaparin (LOVENOX) inj 40 mg  40 mg DAILY Melida Baeza M.D.   40 mg at 17 0812   • furosemide (LASIX) injection 40 mg  40 mg TID Melida Baeza M.D.   40 mg at 17   • potassium chloride (KLOR-CON) 20 MEQ packet 40 mEq  40 mEq TID Melida Baeza M.D.   40 mEq at 17   • fentaNYL (SUBLIMAZE) 50 mcg/mL in 50mL (Continuous Infusion)  50 mcg/hr Continuous Melida Baeza M.D.   Stopped at 17 1030   • piperacillin-tazobactam (ZOSYN) 3.375 g in  mL IVPB  3.375 g Q6HRS Avelino Giron M.D.   Stopped at 17 0213   • oxycodone immediate-release (ROXICODONE) tablet 5 mg  5 mg Q4HRS PRN Gunnar Bryan M.D.   5 mg at 17 1216   • oxycodone immediate release (ROXICODONE) tablet 10 mg  10 mg Q4HRS PRN Gunnar GILLIS  DENISE Bryan   10 mg at 07/03/17 0208   • lorazepam (ATIVAN) injection 1-2 mg  1-2 mg Q4HRS PRN Gunnar Bryan M.D.   2 mg at 07/03/17 1821   • Respiratory Care per Protocol   Continuous RT Gunnar Bryan M.D.       • MD ALERT...Adult ICU Electrolyte Replacement per Pharmacy Protocol   pharmacy to dose Gunnar Bryan M.D.       • fentaNYL (SUBLIMAZE) injection 25 mcg  25 mcg Q HOUR PRN Gunnar Bryan M.D.        Or   • fentaNYL (SUBLIMAZE) injection 50 mcg  50 mcg Q HOUR PRN Gunnar Bryan M.D.   50 mcg at 07/03/17 1409    Or   • fentaNYL (SUBLIMAZE) injection 100 mcg  100 mcg Q HOUR PRN Gunnar Bryan M.D.   100 mcg at 07/03/17 0445   • ipratropium-albuterol (DUONEB) nebulizer solution 3 mL  3 mL Q2HRS PRN (RT) Gunnar Bryan M.D.       • acetaminophen (TYLENOL) tablet 650 mg  650 mg Q4HRS PRN Pedro Vivas M.D.   650 mg at 07/02/17 1241   • acetaminophen (TYLENOL) suppository 650 mg  650 mg Q6HRS PRN Pedro Vivas M.D.   650 mg at 06/24/17 1955   • glucose 4 g chewable tablet 16 g  16 g Q15 MIN PRN Kaya Gonzalez, PHARMD       • dextrose 50% (D50W) injection 25 mL  25 mL Q15 MIN PRN Kaya Gonzalez, KENDALLD       • thiamine (THIAMINE) tablet 100 mg  100 mg DAILY Tunde Panchal M.D.   Stopped at 07/03/17 0900   • folic acid (FOLVITE) tablet 1 mg  1 mg DAILY Tunde Panchal M.D.   Stopped at 07/03/17 0900   • multivitamin (THERAGRAN) tablet 1 Tab  1 Tab DAILY Pedro Vivas M.D.   Stopped at 07/03/17 0900   • senna-docusate (PERICOLACE or SENOKOT S) 8.6-50 MG per tablet 2 Tab  2 Tab BID Pedro Vivas M.D.   2 Tab at 07/03/17 2007    And   • polyethylene glycol/lytes (MIRALAX) PACKET 1 Packet  1 Packet QDAY PRN Pedro Vivas M.D.        And   • magnesium hydroxide (MILK OF MAGNESIA) suspension 30 mL  30 mL QDAY PRN Pedro Vivas M.D.        And   • bisacodyl (DULCOLAX) suppository 10 mg  10 mg QDAY PRN Pedro Vivas M.D.       • lidocaine  (XYLOCAINE) 1%  injection  1-2 mL Q30 MIN PRN Pedro Vivas M.D.       • propofol (DIPRIVAN) injection  5-80 mcg/kg/min Continuous Pedro Vivas M.D.   Stopped at 07/02/17 1030     Last reviewed on 6/23/2017  7:57 PM by Ángel Bazan R.N.      Quality  Measures:  Labs reviewed, Medications reviewed and Radiology images reviewed                      Assessment and Plan:  Acute Hypoxic Respiratory Failure   - trial extubation 6/24 with re-intubation in several hours on 6/24   - extubated 7/3   - cont RT/O2 protocols   - monitor closely in ICU - tenuous resp status   - cont diuresis - decrease lasix to qd today  Status post ground level fall with facial trauma   - 3 cm laceration to his lip, which was sutured in [place], [place]   - seen by trauma and cleared    - pain control  Status post aspiration.  Pseudomonas aeruginosa and Klebsiella pneumonia   - zosyn day 10 of 10  Acute alcohol intoxication with blood alcohol level of 0.206 in [place] at the time of presentation.   - follow/correct lytes   - folate, thiamine, and MVI   - slowly improving encephalopathy - likely toxic/metabolic   - Increase Seroquel to TID today   Acute blood loss anemia.   - follow HH  Thrombocytopenia   - following  Hyponatremia.   - improved  Presumed diabetes mellitus   - glycemic control      Discussed patient condition and risk of morbidity and/or mortality with RN, RT, Pharmacy, Charge nurse / hot rounds and hospitalist.    The patient remains critically ill.  Critical care time = 32 minutes in directly providing and coordinating critical care and extensive data review.  No time overlap and excludes procedures.    Carmen SOTOMAYOR (Alison), am scribing for, and in the presence of, Gunnar Bryan M.D.  Electronically signed by: Carmen Maciel (Alison), 7/4/2017  Gunnar SOTOMAYOR M.D. personally performed the services described in this documentation, as scribed by Carmen Maciel in my presence, and it is both accurate  and complete.

## 2017-07-04 NOTE — CARE PLAN
Problem: Communication  Goal: The ability to communicate needs accurately and effectively will improve  Outcome: PROGRESSING AS EXPECTED  Patient alert to self  Patient extubated on 7/3  Re orientation provided frequently  Speech therapy involved for swallowing and hoarseness        Problem: Safety  Goal: Will remain free from injury  Outcome: PROGRESSING AS EXPECTED  Patient participated in edge of bed mobility x5 minutes  Patient stood with a assistance  Yellow slipper socks applied  Call light within reach   Bed alarm on  Side rails x2      Goal: Will remain free from falls  Outcome: PROGRESSING AS EXPECTED  Fall precautions in place  Pt/ot involved

## 2017-07-04 NOTE — ASSESSMENT & PLAN NOTE
Improving  s/p significant alcohol detox, intubation and remains very confused and agitated. On seroquel and restraints  Decrease Seroquel to 50 mg QHS  CT head negative for acute process, reveals atrophy and microvascular ischemic changes  Ammonia WNL  EEG reveals Mild to moderate diffuse cerebral dysfunction, suggestive of an encephalopathic state  Avoid Ativan  Haldol PRN for agitation  Continue frequent reorientation

## 2017-07-04 NOTE — PROGRESS NOTES
[facility] Hospitalist Progress Note    Date of Service: 2017    Chief Complaint  77 y.o. male admitted 2017 with alcohol detox.    Interval Problem Update  Mr. Bynum was transferred to [facility] due to GLF while intoxicated requiring intubation. He was evaluated by the trauma team. He was successfully extubated and required re-intubation. He was re-extubated on 7/3 and has been quite agitated per his nurse.  1,500 ml urine over night. He is on 2 liters NC oxygen.   Consultants/Specialty  Pulmonary  I discussed with Dr. Bryan on Hot Rounds this morning.  Disposition  ICU        Review of Systems   Unable to perform ROS: medical condition      Physical Exam  Laboratory/Imaging   Hemodynamics  Temp (24hrs), Av.7 °C (98.1 °F), Min:36.4 °C (97.5 °F), Max:37 °C (98.6 °F)   Temperature: 36.8 °C (98.2 °F)  Pulse  Av.7  Min: 54  Max: 145 Heart Rate (Monitored): 91  NIBP: 124/62 mmHg      Respiratory      Respiration: (!) 30, Pulse Oximetry: 94 %, O2 Daily Delivery Respiratory : Silicone Nasal Cannula     Work Of Breathing / Effort: Shallow  RUL Breath Sounds: Coarse Crackles, RML Breath Sounds: Diminished, RLL Breath Sounds: Diminished, LION Breath Sounds: Coarse Crackles, LLL Breath Sounds: Diminished    Fluids    Intake/Output Summary (Last 24 hours) at 17 0851  Last data filed at 17 0600   Gross per 24 hour   Intake   1111 ml   Output   3075 ml   Net  -1964 ml       Nutrition  Orders Placed This Encounter   Procedures   • Diet NPO     Standing Status: Standing      Number of Occurrences: 1      Standing Expiration Date:      Order Specific Question:  Type:     Answer:  Now [1]     Order Specific Question:  Restrict to:     Answer:  Strict [1]     Physical Exam   Constitutional: No distress.   HENT:   Right nares cortrak   Eyes: Right eye exhibits no discharge. Left eye exhibits no discharge.   Neck: Neck supple.   Right IJ central line   Cardiovascular: Regular rhythm.    No murmur  heard.  Pulmonary/Chest: No respiratory distress. He has rales.   Abdominal: He exhibits distension. There is no tenderness. There is no rebound.   Genitourinary:   Herring cath   Musculoskeletal: He exhibits no edema.   In restraints.   Thenar atrophy.   Neurological: He is alert.   Skin: He is not diaphoretic.   Psychiatric:   Oriented only to name       Recent Labs      07/02/17   0510  07/03/17   0441  07/04/17   0430   WBC  9.8  8.4  11.9*   RBC  2.70*  2.73*  3.10*   HEMOGLOBIN  8.8*  8.7*  10.1*   HEMATOCRIT  26.6*  26.9*  31.0*   MCV  98.5*  98.5*  100.0*   MCH  32.6  31.9  32.6   MCHC  33.1*  32.3*  32.6*   RDW  49.9  51.5*  50.0   PLATELETCT  315  362  473*   MPV  9.5  9.3  9.0     Recent Labs      07/03/17   0441  07/03/17   1724  07/04/17   0430   SODIUM  140  144  143   POTASSIUM  3.5*  4.2  3.8   CHLORIDE  104  106  105   CO2  28  27  29   GLUCOSE  184*  155*  162*   BUN  44*  40*  46*   CREATININE  0.86  0.79  0.75   CALCIUM  9.0  8.9  9.3             Recent Labs      07/04/17   0430   TRIGLYCERIDE  226*          Assessment/Plan     Aspiration pneumonitis (CMS-HCC)  Assessment & Plan  Extubated on 6/24 but required re-intubation and extubated again on 7/3.  BAL with pseudomonas and klebsiella  Given zosyn for 10 days to stop today.    Respiratory failure (CMS-HCC)  Assessment & Plan  s/p ventilator x2  Pulmonary consulted and following      Alcohol abuse (present on admission)  Assessment & Plan  Out of withdrawal window  Folate, thiamine    Anemia (present on admission)  Assessment & Plan  No sign of acute bleed.  Hemoglobin has been in the 8-10 range  Continue PPi    Hypokalemia (present on admission)  Assessment & Plan  Supplement with diuresis    Type 2 diabetes mellitus (CMS-HCC) (present on admission)  Assessment & Plan  Sliding scale insulin    Encephalopathy acute (present on admission)  Assessment & Plan  Severe, s/p significant detox and remains very confused and agitated. Increase  seroquel.      Labs reviewed, Medications reviewed and Radiology images reviewed  Herring catheter: Critically Ill - Requiring Accurate Measurement of Urinary Output  Central line in place: Concentrated IV drugs    DVT Prophylaxis: Enoxaparin (Lovenox)

## 2017-07-04 NOTE — PROGRESS NOTES
Bedside report received from day shift RN. All lines and gtts verified. Vs stable. Patient alert to self only. Re orientation provided. Bed alarm on. Call light within reach. Fall precautions in place. Will continue to monitor and assess.

## 2017-07-04 NOTE — PROGRESS NOTES
[facility]ist Progress Note    Date of Service: 7/3/2017    Chief Complaint  77 y.o. male admitted 2017 with alcohol abuse admitted after fall and intubated in ED.    Interval Problem Update  More awake, eyes are open  Following commands, not agitated  Unable to give interval history as intubated  Good UOP, -3L in last 24h    Consultants/Specialty  Pulmonary    Disposition  TBD, wean vent      Review of Systems   Unable to perform ROS: intubated      Physical Exam  Laboratory/Imaging   Hemodynamics  Temp (24hrs), Av.2 °C (98.9 °F), Min:36.9 °C (98.4 °F), Max:37.4 °C (99.3 °F)   Temperature: 36.9 °C (98.4 °F)  Pulse  Av.7  Min: 55  Max: 145 Heart Rate (Monitored): 85  Blood Pressure : 101/47 mmHg, NIBP: 134/52 mmHg      Respiratory  [place] Vent Mode: APVCMV, Rate (breaths/min): 20, PEEP/CPAP: 8, PEEP/CPAP: 8, FiO2: 30, P Peak (PIP): 22, P MEAN: 10   Respiration: 19, Pulse Oximetry: 100 %, O2 Daily Delivery Respiratory : Silicone Nasal Cannula     Work Of Breathing / Effort: Shallow  RUL Breath Sounds: Clear, RML Breath Sounds: Diminished, RLL Breath Sounds: Diminished, LION Breath Sounds: Clear, LLL Breath Sounds: Diminished    Fluids    Intake/Output Summary (Last 24 hours) at 17 1702  Last data filed at 17 1600   Gross per 24 hour   Intake 1258.5 ml   Output   3400 ml   Net -2141.5 ml       Nutrition  Orders Placed This Encounter   Procedures   • Diet NPO     Standing Status: Standing      Number of Occurrences: 1      Standing Expiration Date:      Order Specific Question:  Type:     Answer:  Now [1]     Order Specific Question:  Restrict to:     Answer:  Strict [1]     Physical Exam   Constitutional: No distress. He is sedated, intubated and restrained.   obese   HENT:   Head: Normocephalic and atraumatic.   Nose: Nose normal.   Face is swollen   Eyes: Conjunctivae are normal. Right eye exhibits no discharge. Left eye exhibits no discharge.   Neck: Neck supple. No tracheal  deviation present.   Cardiovascular: Normal rate, regular rhythm, normal heart sounds and intact distal pulses.    No murmur heard.  Pulmonary/Chest: He is intubated. No respiratory distress.   Equal chest rise and fall  Mechanical breath sounds bilaterally  No overt wheezing     Abdominal: Soft. Bowel sounds are normal. He exhibits no distension and no mass. There is no tenderness. There is no guarding.   Musculoskeletal: He exhibits no edema.   Neurological: He is alert.   Eyes open, awake  Moves all ext with purpose  Follows commands to  bilaterally   Skin: Skin is warm. No rash noted. He is not diaphoretic. No erythema.   Vitals reviewed.      Recent Labs      07/01/17 0530 07/02/17   0510  07/03/17   0441   WBC  9.4  9.8  8.4   RBC  2.57*  2.70*  2.73*   HEMOGLOBIN  8.4*  8.8*  8.7*   HEMATOCRIT  25.1*  26.6*  26.9*   MCV  97.7  98.5*  98.5*   MCH  32.7  32.6  31.9   MCHC  33.5*  33.1*  32.3*   RDW  49.6  49.9  51.5*   PLATELETCT  249  315  362   MPV  9.5  9.5  9.3     Recent Labs      07/01/17   0530  07/02/17   0510  07/03/17   0441   SODIUM  140  140  140   POTASSIUM  3.8  3.9  3.5*   CHLORIDE  103  104  104   CO2  29  28  28   GLUCOSE  166*  188*  184*   BUN  27*  34*  44*   CREATININE  0.67  0.67  0.86   CALCIUM  8.4*  8.5  9.0             Recent Labs      07/01/17   0530   TRIGLYCERIDE  195*          Assessment/Plan     Aspiration pneumonitis (CMS-HCC)  Assessment & Plan  Extubated on 6/24 but required re-intubation  BAL with pseudomonas and klebsiella  Continue zosyn    Respiratory failure (CMS-HCC)  Assessment & Plan  On ventilator  Pulmonary consulting  Treat pneumonia  More awake and appropriate today, he may be able to be extubated today    Alcohol abuse (present on admission)  Assessment & Plan  Folate, thiamine    Anemia (present on admission)  Assessment & Plan  No sign of acute bleed.  Hemoglobin low but stable  Continue PPi    Hypokalemia  Assessment & Plan  Supplement with diuresis       Radiology images reviewed, Labs reviewed and Medications reviewed  Herring catheter: Critically Ill - Requiring Accurate Measurement of Urinary Output and Unconscious / Sedated Patient on a Ventilator

## 2017-07-04 NOTE — PROGRESS NOTES
Patient assessed before morning report and patient was in bed with cortrack laying on chest (tip intact) tube feeding immediatly stopped. Charge RN notified for replacement

## 2017-07-05 ENCOUNTER — APPOINTMENT (OUTPATIENT)
Dept: RADIOLOGY | Facility: MEDICAL CENTER | Age: 77
DRG: 163 | End: 2017-07-05
Attending: HOSPITALIST
Payer: MEDICARE

## 2017-07-05 ENCOUNTER — APPOINTMENT (OUTPATIENT)
Dept: RADIOLOGY | Facility: MEDICAL CENTER | Age: 77
DRG: 163 | End: 2017-07-05
Attending: INTERNAL MEDICINE
Payer: MEDICARE

## 2017-07-05 LAB
ANION GAP SERPL CALC-SCNC: 8 MMOL/L (ref 0–11.9)
BASOPHILS # BLD AUTO: 0.7 % (ref 0–1.8)
BASOPHILS # BLD: 0.08 K/UL (ref 0–0.12)
BUN SERPL-MCNC: 49 MG/DL (ref 8–22)
CALCIUM SERPL-MCNC: 9.6 MG/DL (ref 8.5–10.5)
CHLORIDE SERPL-SCNC: 110 MMOL/L (ref 96–112)
CO2 SERPL-SCNC: 28 MMOL/L (ref 20–33)
CREAT SERPL-MCNC: 0.67 MG/DL (ref 0.5–1.4)
EOSINOPHIL # BLD AUTO: 0.21 K/UL (ref 0–0.51)
EOSINOPHIL NFR BLD: 1.8 % (ref 0–6.9)
ERYTHROCYTE [DISTWIDTH] IN BLOOD BY AUTOMATED COUNT: 50.4 FL (ref 35.9–50)
GFR SERPL CREATININE-BSD FRML MDRD: >60 ML/MIN/1.73 M 2
GLUCOSE SERPL-MCNC: 199 MG/DL (ref 65–99)
HCT VFR BLD AUTO: 34.1 % (ref 42–52)
HGB BLD-MCNC: 10.8 G/DL (ref 14–18)
IMM GRANULOCYTES # BLD AUTO: 0.09 K/UL (ref 0–0.11)
IMM GRANULOCYTES NFR BLD AUTO: 0.8 % (ref 0–0.9)
LYMPHOCYTES # BLD AUTO: 0.92 K/UL (ref 1–4.8)
LYMPHOCYTES NFR BLD: 7.7 % (ref 22–41)
MCH RBC QN AUTO: 31.9 PG (ref 27–33)
MCHC RBC AUTO-ENTMCNC: 31.7 G/DL (ref 33.7–35.3)
MCV RBC AUTO: 100.6 FL (ref 81.4–97.8)
MONOCYTES # BLD AUTO: 0.57 K/UL (ref 0–0.85)
MONOCYTES NFR BLD AUTO: 4.8 % (ref 0–13.4)
NEUTROPHILS # BLD AUTO: 10.1 K/UL (ref 1.82–7.42)
NEUTROPHILS NFR BLD: 84.2 % (ref 44–72)
NRBC # BLD AUTO: 0 K/UL
NRBC BLD AUTO-RTO: 0 /100 WBC
PLATELET # BLD AUTO: 587 K/UL (ref 164–446)
PMV BLD AUTO: 8.9 FL (ref 9–12.9)
POTASSIUM SERPL-SCNC: 3.9 MMOL/L (ref 3.6–5.5)
RBC # BLD AUTO: 3.39 M/UL (ref 4.7–6.1)
SODIUM SERPL-SCNC: 146 MMOL/L (ref 135–145)
WBC # BLD AUTO: 12 K/UL (ref 4.8–10.8)

## 2017-07-05 PROCEDURE — 80048 BASIC METABOLIC PNL TOTAL CA: CPT

## 2017-07-05 PROCEDURE — 700111 HCHG RX REV CODE 636 W/ 250 OVERRIDE (IP): Performed by: INTERNAL MEDICINE

## 2017-07-05 PROCEDURE — A9270 NON-COVERED ITEM OR SERVICE: HCPCS | Performed by: INTERNAL MEDICINE

## 2017-07-05 PROCEDURE — 700102 HCHG RX REV CODE 250 W/ 637 OVERRIDE(OP): Performed by: HOSPITALIST

## 2017-07-05 PROCEDURE — 93005 ELECTROCARDIOGRAM TRACING: CPT | Performed by: INTERNAL MEDICINE

## 2017-07-05 PROCEDURE — 93010 ELECTROCARDIOGRAM REPORT: CPT | Performed by: INTERNAL MEDICINE

## 2017-07-05 PROCEDURE — 700102 HCHG RX REV CODE 250 W/ 637 OVERRIDE(OP)

## 2017-07-05 PROCEDURE — 770006 HCHG ROOM/CARE - MED/SURG/GYN SEMI*

## 2017-07-05 PROCEDURE — 700102 HCHG RX REV CODE 250 W/ 637 OVERRIDE(OP): Performed by: INTERNAL MEDICINE

## 2017-07-05 PROCEDURE — 99232 SBSQ HOSP IP/OBS MODERATE 35: CPT | Performed by: HOSPITALIST

## 2017-07-05 PROCEDURE — 85025 COMPLETE CBC W/AUTO DIFF WBC: CPT

## 2017-07-05 PROCEDURE — A9270 NON-COVERED ITEM OR SERVICE: HCPCS

## 2017-07-05 PROCEDURE — A4357 BEDSIDE DRAINAGE BAG: HCPCS | Performed by: HOSPITALIST

## 2017-07-05 PROCEDURE — A9270 NON-COVERED ITEM OR SERVICE: HCPCS | Performed by: HOSPITALIST

## 2017-07-05 RX ORDER — QUETIAPINE FUMARATE 25 MG/1
50 TABLET, FILM COATED ORAL 2 TIMES DAILY
Status: DISCONTINUED | OUTPATIENT
Start: 2017-07-05 | End: 2017-07-07

## 2017-07-05 RX ORDER — TIOTROPIUM BROMIDE 18 UG/1
1 CAPSULE ORAL; RESPIRATORY (INHALATION) DAILY
Status: DISCONTINUED | OUTPATIENT
Start: 2017-07-05 | End: 2017-07-12 | Stop reason: HOSPADM

## 2017-07-05 RX ADMIN — Medication 100 MG: at 07:26

## 2017-07-05 RX ADMIN — FUROSEMIDE 40 MG: 10 INJECTION, SOLUTION INTRAMUSCULAR; INTRAVENOUS at 07:26

## 2017-07-05 RX ADMIN — TIOTROPIUM BROMIDE 1 CAPSULE: 18 CAPSULE ORAL; RESPIRATORY (INHALATION) at 12:23

## 2017-07-05 RX ADMIN — THERA TABS 1 TABLET: TAB at 07:26

## 2017-07-05 RX ADMIN — FOLIC ACID 1 MG: 1 TABLET ORAL at 07:26

## 2017-07-05 RX ADMIN — FAMOTIDINE 20 MG: 20 TABLET, FILM COATED ORAL at 07:26

## 2017-07-05 RX ADMIN — QUETIAPINE FUMARATE 50 MG: 25 TABLET ORAL at 07:26

## 2017-07-05 RX ADMIN — ENOXAPARIN SODIUM 40 MG: 100 INJECTION SUBCUTANEOUS at 07:27

## 2017-07-05 RX ADMIN — OXYCODONE HYDROCHLORIDE 5 MG: 5 TABLET ORAL at 07:26

## 2017-07-05 RX ADMIN — SENNOSIDES AND DOCUSATE SODIUM 2 TABLET: 8.6; 5 TABLET ORAL at 07:26

## 2017-07-05 ASSESSMENT — PAIN SCALES - GENERAL
PAINLEVEL_OUTOF10: ASSUMED PAIN PRESENT
PAINLEVEL_OUTOF10: ASSUMED PAIN PRESENT
PAINLEVEL_OUTOF10: 0

## 2017-07-05 NOTE — DISCHARGE PLANNING
Extubated.  Goal edge of bed today.  All therapies following.  Less confused.     Likely skilled placement.

## 2017-07-05 NOTE — PROGRESS NOTES
Bedside report received, assumed care of pt.  Boards updated.  Pt resting in bed, no signs of distress noted.  No signs of pain noted at this time, all needs met.  Call light with in reach, hourly rounding in place.

## 2017-07-05 NOTE — CARE PLAN
Problem: Fluid Volume:  Goal: Will maintain balanced intake and output  Intervention: Monitor, educate, and encourage compliance with therapeutic intake of liquids  Receiving lasix, urine output adequate, see MAR and flowsheets       Problem: Risk of Aspiration  Goal: Absence of aspiration  Intervention: NPO until medically cleared  Recently extubated, pending speech Rad caruso in place

## 2017-07-05 NOTE — FLOWSHEET NOTE
07/05/17 1226   Events/Summary/Plan   Events/Summary/Plan Spiriva mdi   Interdisciplinary Plan of Care-Goals (Indications)   Obstructive Ventilatory Defect or Pulmonary Disease without Obvious Obstruction History / Diagnosis   Interdisciplinary Plan of Care-Outcomes    Bronchodilator Outcome Patient at Stable Baseline   Education   Education Yes - Pt. / Family has been Instructed in use of Respiratory Equipment   RT Assessment of Delivered Medications   Evaluation of Medication Delivery Daily Yes-- Pt /Family has been Instructed in use of Respiratory Medications and Adverse Reactions   MDI/DPI Group   #MDI/DPI Given MDI/DPI x 1   Breath Sounds   RUL Breath Sounds Diminished;Rhonchi   RML Breath Sounds Diminished   RLL Breath Sounds Diminished   LION Breath Sounds Diminished;Rhonchi   LLL Breath Sounds Diminished   Secretions   Cough Congested;Weak;Non Productive   Oxygen   O2 (LPM) 1   O2 Daily Delivery Respiratory  Silicone Nasal Cannula

## 2017-07-05 NOTE — PROGRESS NOTES
Pulmonary Critical Care Progress Note        Date of Service: 7/5/2017      Chief Complaint: GLF and ETOH    History of Present Illness:   This is a 77-year-old male admitted to the ICU through the ED on 6/23/17 for acute alcohol intoxication and ground level fall. Patient intubated in the ED.     ROS:  Respiratory: unable to perform due to the patient's inability to effectively communicate, Cardiac: unable to perform due to the patient's inability to effectively communicate, GI: unable to perform due to the patient's inability to effectively communicate.  All other systems negative.    Interval Events:  24 hour interval history reviewed   SR, -140s  BM yesterday, TF at goal  Herring 700 cc UOP with Lasix, NS TKO      PFSH:  No change.     Physical Exam:  General:  Intermittently agitated, off sedation, remains on O2 2L marginal saturations but not in respiratory distress  HEENT:  PERRL, Right nare cortrak in place with TF at 50, mucous membranes pink and dry  CVS:  Distant, regular  Respiratory:  Diminished left greater than right, no wheezing  Abdomen:  Soft, nontender  Extremities: No edema, 2+ distal pulses  Skin:  Cool, dry, no mottling  Neuro/Psych:  Mild left facial droop, moves all extremities symmetrically, no drift, follows commands but somewhat confused, intermittently agitated, impulsive    Respiratory:     Pulse Oximetry: 96 %  ImagingAvailable data reviewed     Recent Labs      07/03/17   0430   ISTATAPH  7.514*   ISTATAPCO2  34.6   ISTATAPO2  83   ISTATATCO2  29   LHVPOPZ3CLD  97   ISTATARTHCO3  27.9*   ISTATARTBE  5*   ISTATTEMP  37.1 C   ISTATFIO2  30   ISTATSPEC  Arterial   ISTATAPHTC  7.513*   [id number]  84     HemoDynamics:  Pulse: 91, Heart Rate (Monitored): 93  NIBP: 131/73 mmHg     Imaging: Available data reviewed       Neuro:  Total Donny Coma Score: 13  Imaging: Available data reviewed       Fluids:  Intake/Output       07/03/17 0700 - 07/04/17 0659 07/04/17 0700 - 07/05/17 0659  17 - 17 0659       Total  Total  Total       Intake    I.V.  62  280 342  20  155 175  --  -- --    Fentanyl  2 -- 2 -- -- -- -- -- --    IV Volume (Normal saline) 60 60 120 20 55 75 -- -- --    IV Volume (IVPB) -- 220 220 -- 100 100 -- -- --    Other  --  90 90  60  -- 60  --  -- --    Medications (P.O./ Enteral Liquids) --  90 60 -- 60 -- -- --    Enteral  --  690 690  600  900 1500  --  -- --    Enteral Volume -- 600 600  -- -- --    Free Water / Tube Flush --  90 -- 300 300 -- -- --    Total Intake 62 1060 1732 624 9691 1735 -- -- --       Output    Urine    1225 3200  1425  700 2125  125  -- 125    Indwelling Cathether  1225 3200 6251 962 3799 125 -- 125    Stool  --  -- --  --  -- --  --  -- --    Number of Times Stooled -- 2 x 2 x 1 x -- 1 x -- -- --    Total Output  1225 3200 0168 442 8374 125 -- 125       Net I/O     -1913 -165 -2078 -745 355 -390 -125 -- -125           Recent Labs      170   SODIUM  144  143  146*   POTASSIUM  4.2  3.8  3.9   CHLORIDE  106  105  110   CO2  27  29  28   BUN  40*  46*  49*   CREATININE  0.79  0.75  0.67   CALCIUM  8.9  9.3  9.6       GI/Nutrition:  Imaging: Available data reviewed  NPO and tube feed 50     Liver Function  Recent Labs      17   0450   ALTSGPT  35   --    --    --    ASTSGOT  19   --    --    --    ALKPHOSPHAT  163*   --    --    --    TBILIRUBIN  0.4   --    --    --    PREALBUMIN  20.0   --    --    --    GLUCOSE  184*  155*  162*  199*     Heme:  Recent Labs      17   0441  17   0430  17   0450   RBC  2.73*  3.10*  3.39*   HEMOGLOBIN  8.7*  10.1*  10.8*   HEMATOCRIT  26.9*  31.0*  34.1*   PLATELETCT  362  473*  587*     Infectious Disease:  Temp  Av.6 °C (97.8 °F)  Min: 36.3 °C (97.3 °F)  Max: 37 °C (98.6 °F)  Micro: reviewed      Recent Labs      07/03/17   0441  07/04/17   0430  07/05/17   0450   WBC  8.4  11.9*  12.0*   NEUTSPOLYS  75.40*  83.40*  84.20*   LYMPHOCYTES  13.70*  7.30*  7.70*   MONOCYTES  6.90  5.70  4.80   EOSINOPHILS  2.60  2.40  1.80   BASOPHILS  0.60  0.40  0.70   ASTSGOT  19   --    --    ALTSGPT  35   --    --    ALKPHOSPHAT  163*   --    --    TBILIRUBIN  0.4   --    --      Current Facility-Administered Medications   Medication Dose Frequency Provider Last Rate Last Dose   • furosemide (LASIX) injection 40 mg  40 mg Q DAY Gunnar Bryan M.D.   40 mg at 07/05/17 0726   • potassium chloride (KLOR-CON) 20 MEQ packet 40 mEq  40 mEq DAILY Gunnar Bryan M.D.       • quetiapine (SEROQUEL) tablet 50 mg  50 mg TID Gunnar Bryan M.D.   50 mg at 07/05/17 0726   • famotidine (PEPCID) tablet 20 mg  20 mg BID Apryl Arias, OG   20 mg at 07/05/17 0726   • tiotropium (SPIRIVA) 18 MCG inhalation capsule 1 Cap  1 Cap QDAILY (RT) Gunnar Bryan M.D.   Stopped at 07/03/17 1545   • enoxaparin (LOVENOX) inj 40 mg  40 mg DAILY Melida Baeza M.D.   40 mg at 07/05/17 0727   • oxycodone immediate-release (ROXICODONE) tablet 5 mg  5 mg Q4HRS PRN Gunnar Bryan M.D.   5 mg at 07/05/17 0726   • oxycodone immediate release (ROXICODONE) tablet 10 mg  10 mg Q4HRS PRN Gunnar Bryan M.D.   10 mg at 07/03/17 0208   • lorazepam (ATIVAN) injection 1-2 mg  1-2 mg Q4HRS PRN Gunnar Bryan M.D.   2 mg at 07/03/17 1821   • Respiratory Care per Protocol   Continuous RT Gunnar Bryan M.D.       • MD ALERT...Adult ICU Electrolyte Replacement per Pharmacy Protocol   pharmacy to dose Gunnar Bryan M.D.       • fentaNYL (SUBLIMAZE) injection 25 mcg  25 mcg Q HOUR PRN Gunnar Bryan M.D.        Or   • fentaNYL (SUBLIMAZE) injection 50 mcg  50 mcg Q HOUR PRN Gunnar Bryan M.D.   50 mcg at 07/03/17 1409    Or   • fentaNYL (SUBLIMAZE) injection 100 mcg  100 mcg Q HOUR PRN Gunnar Bryan M.D.   100 mcg at 07/03/17 4623   •  ipratropium-albuterol (DUONEB) nebulizer solution 3 mL  3 mL Q2HRS PRN (RT) Gunnar Bryan M.D.       • acetaminophen (TYLENOL) tablet 650 mg  650 mg Q4HRS PRN Pedro Vivas M.D.   650 mg at 07/02/17 1241   • acetaminophen (TYLENOL) suppository 650 mg  650 mg Q6HRS PRN Pedro Vivas M.D.   650 mg at 06/24/17 1955   • glucose 4 g chewable tablet 16 g  16 g Q15 MIN PRN Kaya Gonzalez, PHARMD       • dextrose 50% (D50W) injection 25 mL  25 mL Q15 MIN PRN Kaya Gonzalez PHARMD       • thiamine (THIAMINE) tablet 100 mg  100 mg DAILY Tunde Panchal M.D.   100 mg at 07/05/17 0726   • folic acid (FOLVITE) tablet 1 mg  1 mg DAILY Tunde Panchal M.D.   1 mg at 07/05/17 0726   • multivitamin (THERAGRAN) tablet 1 Tab  1 Tab DAILY Pedro Vivas M.D.   1 Tab at 07/05/17 0726   • senna-docusate (PERICOLACE or SENOKOT S) 8.6-50 MG per tablet 2 Tab  2 Tab BID Pedro Vivas M.D.   2 Tab at 07/05/17 0726    And   • polyethylene glycol/lytes (MIRALAX) PACKET 1 Packet  1 Packet QDAY PRN Pedro Vivas M.D.        And   • magnesium hydroxide (MILK OF MAGNESIA) suspension 30 mL  30 mL QDAY PRN Pedro Vivas M.D.        And   • bisacodyl (DULCOLAX) suppository 10 mg  10 mg QDAY PRN Pedro Vivas M.D.         Last reviewed on 6/23/2017  7:57 PM by Ángel Bazan R.N.      Quality  Measures:  Labs reviewed, Medications reviewed and Radiology images reviewed                      Assessment and Plan:  Acute Hypoxic Respiratory Failure   - trial extubation 6/24 with re-intubation in several hours on 6/24   - extubated 7/3   - cont RT/O2 protocols   - monitor closely in ICU - tenuous resp status   - stop diuresis - now dry  Status post ground level fall with facial trauma   - 3 cm laceration to his lip, which was sutured in [place], [place]   - seen by trauma and cleared    - pain control  Status post aspiration.  Pseudomonas aeruginosa and Klebsiella pneumonia   - zosyn  completed  Acute alcohol intoxication with blood alcohol level of 0.206 in [place] at the time of presentation.   - follow/correct lytes   - folate, thiamine, and MVI   - slowly improving encephalopathy - likely toxic/metabolic   - Increase Seroquel to TID today   Acute blood loss anemia.   - follow HH  Thrombocytopenia   - following  Hyponatremia.   - improved  Presumed diabetes mellitus   - glycemic control    OK to move out of ICU today  Discussed patient condition and risk of morbidity and/or mortality with RN, RT, Pharmacy, Charge nurse / hot rounds and hospitalist.    [facility] will sign off on transfer out of ICU.  Please call if needed.       Neva SOTOMAYOR (Scribe), am scribing for, and in the presence of, Gunnar Bryan M.D.  Electronically signed by: Neva Yeung (Alison), 7/5/2017    Gunnar SOTOMAYOR M.D. personally performed the services described in this documentation, as scribed by Neva Yeung in my presence, and it is both accurate and complete.

## 2017-07-05 NOTE — PROGRESS NOTES
[facility] Hospitalist Progress Note    Date of Service: 2017    Chief Complaint  77 y.o. male admitted 2017 with alcohol detox.    Interval Problem Update  Mr. Bynum was transferred to [facility] due to GLF while intoxicated requiring intubation. He was evaluated by the trauma team. He was successfully extubated and required re-intubation. He was re-extubated on 7/3. His nurse notes less agitated and has been more compliant with mobilization.  700 ml urine over night. He is on 2 liters [place] oxygen this morning. His wife has been calling from [employer].   Consultants/Specialty  Pulmonary  I discussed with Dr. Bryan on Hot Rounds this morning.  Disposition  medical   to [facility] for SNF--orders placed.      Review of Systems   Unable to perform ROS: medical condition      Physical Exam  Laboratory/Imaging   Hemodynamics  Temp (24hrs), Av.6 °C (97.8 °F), Min:36.3 °C (97.3 °F), Max:37 °C (98.6 °F)   Temperature: 36.3 °C (97.3 °F)  Pulse  Av.2  Min: 54  Max: 145 Heart Rate (Monitored): 93  NIBP: 131/73 mmHg      Respiratory      Respiration: 20, Pulse Oximetry: 96 %, O2 Daily Delivery Respiratory : Silicone Nasal Cannula     Work Of Breathing / Effort: Shallow  RUL Breath Sounds: Clear, RML Breath Sounds: Diminished, RLL Breath Sounds: Diminished, LION Breath Sounds: Diminished, LLL Breath Sounds: Diminished    Fluids    Intake/Output Summary (Last 24 hours) at 17 0727  Last data filed at 17 0600   Gross per 24 hour   Intake   1735 ml   Output   2125 ml   Net   -390 ml       Nutrition  Orders Placed This Encounter   Procedures   • Diet NPO     Standing Status: Standing      Number of Occurrences: 1      Standing Expiration Date:      Order Specific Question:  Type:     Answer:  Now [1]     Order Specific Question:  Restrict to:     Answer:  Strict [1]     Physical Exam   Constitutional: No distress.   HENT:   Right nares cortrak   Eyes: Right eye exhibits no discharge. Left eye exhibits  no discharge.   Cardiovascular: Normal rate and regular rhythm.    No murmur heard.  Pulmonary/Chest: No respiratory distress. He has rales.   Abdominal: He exhibits distension. There is no tenderness. There is no rebound.   Musculoskeletal: He exhibits no edema.   Thenar atrophy.   Neurological: He is alert.   Skin: Skin is warm and dry.   Psychiatric:   Pleasantly confused. +cooperative.       Recent Labs      07/03/17   0441  07/04/17   0430  07/05/17   0450   WBC  8.4  11.9*  12.0*   RBC  2.73*  3.10*  3.39*   HEMOGLOBIN  8.7*  10.1*  10.8*   HEMATOCRIT  26.9*  31.0*  34.1*   MCV  98.5*  100.0*  100.6*   MCH  31.9  32.6  31.9   MCHC  32.3*  32.6*  31.7*   RDW  51.5*  50.0  50.4*   PLATELETCT  362  473*  587*   MPV  9.3  9.0  8.9*     Recent Labs      07/03/17   1724  07/04/17   0430  07/05/17   0450   SODIUM  144  143  146*   POTASSIUM  4.2  3.8  3.9   CHLORIDE  106  105  110   CO2  27  29  28   GLUCOSE  155*  162*  199*   BUN  40*  46*  49*   CREATININE  0.79  0.75  0.67   CALCIUM  8.9  9.3  9.6             Recent Labs      07/04/17   0430   TRIGLYCERIDE  226*          Assessment/Plan     Aspiration pneumonitis (CMS-HCC)  Assessment & Plan  Extubated on 6/24 but required re-intubation and extubated again on 7/3.  BAL with pseudomonas and klebsiella  Given zosyn for 10 days to stop today.    Respiratory failure (CMS-HCC)  Assessment & Plan  s/p ventilator x2  Pulmonary consulted and following      Alcohol abuse (present on admission)  Assessment & Plan  Out of withdrawal window  Folate, thiamine    Anemia (present on admission)  Assessment & Plan  No sign of acute bleed.  Hemoglobin has been in the 8-10 range  Continue PPi    Hypokalemia (present on admission)  Assessment & Plan  Supplement with diuresis    Type 2 diabetes mellitus (CMS-HCC) (present on admission)  Assessment & Plan  Sliding scale insulin    Encephalopathy acute (present on admission)  Assessment & Plan  Severe, s/p significant detox and remains  very confused and agitated. On seroquel.  SNF referral placed.       Labs reviewed, Medications reviewed and Radiology images reviewed        DVT Prophylaxis: Enoxaparin (Lovenox)                k

## 2017-07-05 NOTE — PROGRESS NOTES
Report called to RN for transfer to S602-1.  Wife called and updated wife on room transfer.  All belongings and chart with pt.

## 2017-07-05 NOTE — PROGRESS NOTES
Bedside report received from Kelly BUTLER. Patient resting comfortably, lines, drips and monitor alarms verified.

## 2017-07-06 ENCOUNTER — APPOINTMENT (OUTPATIENT)
Dept: RADIOLOGY | Facility: MEDICAL CENTER | Age: 77
DRG: 163 | End: 2017-07-06
Attending: INTERNAL MEDICINE
Payer: MEDICARE

## 2017-07-06 PROBLEM — G93.40 ENCEPHALOPATHY: Status: ACTIVE | Noted: 2017-07-06

## 2017-07-06 PROBLEM — G93.40 ENCEPHALOPATHY: Status: RESOLVED | Noted: 2017-07-06 | Resolved: 2017-07-06

## 2017-07-06 PROBLEM — E87.0 HYPERNATREMIA: Status: ACTIVE | Noted: 2017-07-06

## 2017-07-06 LAB
AMMONIA PLAS-SCNC: 41 UMOL/L (ref 11–45)
ANION GAP SERPL CALC-SCNC: 11 MMOL/L (ref 0–11.9)
BASOPHILS # BLD AUTO: 0.4 % (ref 0–1.8)
BASOPHILS # BLD: 0.06 K/UL (ref 0–0.12)
BUN SERPL-MCNC: 58 MG/DL (ref 8–22)
CALCIUM SERPL-MCNC: 9.9 MG/DL (ref 8.5–10.5)
CHLORIDE SERPL-SCNC: 112 MMOL/L (ref 96–112)
CO2 SERPL-SCNC: 26 MMOL/L (ref 20–33)
CREAT SERPL-MCNC: 0.72 MG/DL (ref 0.5–1.4)
EKG IMPRESSION: NORMAL
EOSINOPHIL # BLD AUTO: 0.06 K/UL (ref 0–0.51)
EOSINOPHIL NFR BLD: 0.4 % (ref 0–6.9)
ERYTHROCYTE [DISTWIDTH] IN BLOOD BY AUTOMATED COUNT: 51.4 FL (ref 35.9–50)
FOLATE SERPL-MCNC: >23.7 NG/ML
GFR SERPL CREATININE-BSD FRML MDRD: >60 ML/MIN/1.73 M 2
GLUCOSE BLD-MCNC: 184 MG/DL (ref 65–99)
GLUCOSE BLD-MCNC: 237 MG/DL (ref 65–99)
GLUCOSE BLD-MCNC: 283 MG/DL (ref 65–99)
GLUCOSE SERPL-MCNC: 247 MG/DL (ref 65–99)
HCT VFR BLD AUTO: 35.1 % (ref 42–52)
HGB BLD-MCNC: 11.4 G/DL (ref 14–18)
IMM GRANULOCYTES # BLD AUTO: 0.11 K/UL (ref 0–0.11)
IMM GRANULOCYTES NFR BLD AUTO: 0.8 % (ref 0–0.9)
LYMPHOCYTES # BLD AUTO: 1.14 K/UL (ref 1–4.8)
LYMPHOCYTES NFR BLD: 8.4 % (ref 22–41)
MAGNESIUM SERPL-MCNC: 2.7 MG/DL (ref 1.5–2.5)
MCH RBC QN AUTO: 32.1 PG (ref 27–33)
MCHC RBC AUTO-ENTMCNC: 32.5 G/DL (ref 33.7–35.3)
MCV RBC AUTO: 98.9 FL (ref 81.4–97.8)
MONOCYTES # BLD AUTO: 0.66 K/UL (ref 0–0.85)
MONOCYTES NFR BLD AUTO: 4.8 % (ref 0–13.4)
NEUTROPHILS # BLD AUTO: 11.61 K/UL (ref 1.82–7.42)
NEUTROPHILS NFR BLD: 85.2 % (ref 44–72)
NRBC # BLD AUTO: 0 K/UL
NRBC BLD AUTO-RTO: 0 /100 WBC
PHOSPHATE SERPL-MCNC: 2.7 MG/DL (ref 2.5–4.5)
PLATELET # BLD AUTO: 642 K/UL (ref 164–446)
PMV BLD AUTO: 9.1 FL (ref 9–12.9)
POTASSIUM SERPL-SCNC: 3.4 MMOL/L (ref 3.6–5.5)
RBC # BLD AUTO: 3.55 M/UL (ref 4.7–6.1)
SODIUM SERPL-SCNC: 149 MMOL/L (ref 135–145)
TSH SERPL DL<=0.005 MIU/L-ACNC: 1.96 UIU/ML (ref 0.3–3.7)
VIT B12 SERPL-MCNC: 944 PG/ML (ref 211–911)
WBC # BLD AUTO: 13.6 K/UL (ref 4.8–10.8)

## 2017-07-06 PROCEDURE — 36415 COLL VENOUS BLD VENIPUNCTURE: CPT

## 2017-07-06 PROCEDURE — 700102 HCHG RX REV CODE 250 W/ 637 OVERRIDE(OP): Performed by: INTERNAL MEDICINE

## 2017-07-06 PROCEDURE — 700102 HCHG RX REV CODE 250 W/ 637 OVERRIDE(OP): Performed by: HOSPITALIST

## 2017-07-06 PROCEDURE — A9270 NON-COVERED ITEM OR SERVICE: HCPCS | Performed by: INTERNAL MEDICINE

## 2017-07-06 PROCEDURE — 82607 VITAMIN B-12: CPT

## 2017-07-06 PROCEDURE — 700102 HCHG RX REV CODE 250 W/ 637 OVERRIDE(OP)

## 2017-07-06 PROCEDURE — 700111 HCHG RX REV CODE 636 W/ 250 OVERRIDE (IP): Performed by: INTERNAL MEDICINE

## 2017-07-06 PROCEDURE — 82140 ASSAY OF AMMONIA: CPT

## 2017-07-06 PROCEDURE — 84443 ASSAY THYROID STIM HORMONE: CPT

## 2017-07-06 PROCEDURE — 99233 SBSQ HOSP IP/OBS HIGH 50: CPT | Performed by: INTERNAL MEDICINE

## 2017-07-06 PROCEDURE — 82962 GLUCOSE BLOOD TEST: CPT

## 2017-07-06 PROCEDURE — 84100 ASSAY OF PHOSPHORUS: CPT

## 2017-07-06 PROCEDURE — A9270 NON-COVERED ITEM OR SERVICE: HCPCS | Performed by: HOSPITALIST

## 2017-07-06 PROCEDURE — 82746 ASSAY OF FOLIC ACID SERUM: CPT

## 2017-07-06 PROCEDURE — 83735 ASSAY OF MAGNESIUM: CPT

## 2017-07-06 PROCEDURE — 70450 CT HEAD/BRAIN W/O DYE: CPT

## 2017-07-06 PROCEDURE — 770006 HCHG ROOM/CARE - MED/SURG/GYN SEMI*

## 2017-07-06 PROCEDURE — 85025 COMPLETE CBC W/AUTO DIFF WBC: CPT

## 2017-07-06 PROCEDURE — A9270 NON-COVERED ITEM OR SERVICE: HCPCS

## 2017-07-06 PROCEDURE — 80048 BASIC METABOLIC PNL TOTAL CA: CPT

## 2017-07-06 RX ORDER — DEXTROSE MONOHYDRATE 25 G/50ML
25 INJECTION, SOLUTION INTRAVENOUS
Status: DISCONTINUED | OUTPATIENT
Start: 2017-07-06 | End: 2017-07-12 | Stop reason: HOSPADM

## 2017-07-06 RX ADMIN — THERA TABS 1 TABLET: TAB at 07:36

## 2017-07-06 RX ADMIN — FAMOTIDINE 20 MG: 20 TABLET, FILM COATED ORAL at 22:24

## 2017-07-06 RX ADMIN — INSULIN LISPRO 2 UNITS: 100 INJECTION, SOLUTION INTRAVENOUS; SUBCUTANEOUS at 17:38

## 2017-07-06 RX ADMIN — FAMOTIDINE 20 MG: 20 TABLET, FILM COATED ORAL at 00:38

## 2017-07-06 RX ADMIN — QUETIAPINE FUMARATE 50 MG: 25 TABLET ORAL at 22:24

## 2017-07-06 RX ADMIN — POLYETHYLENE GLYCOL 3350 1 PACKET: 17 POWDER, FOR SOLUTION ORAL at 00:38

## 2017-07-06 RX ADMIN — QUETIAPINE FUMARATE 50 MG: 25 TABLET ORAL at 00:38

## 2017-07-06 RX ADMIN — SENNOSIDES AND DOCUSATE SODIUM 2 TABLET: 8.6; 5 TABLET ORAL at 07:36

## 2017-07-06 RX ADMIN — ENOXAPARIN SODIUM 40 MG: 100 INJECTION SUBCUTANEOUS at 07:50

## 2017-07-06 RX ADMIN — QUETIAPINE FUMARATE 50 MG: 25 TABLET ORAL at 07:36

## 2017-07-06 RX ADMIN — Medication 100 MG: at 07:37

## 2017-07-06 RX ADMIN — FOLIC ACID 1 MG: 1 TABLET ORAL at 07:36

## 2017-07-06 RX ADMIN — ACETAMINOPHEN 650 MG: 325 TABLET, FILM COATED ORAL at 07:37

## 2017-07-06 RX ADMIN — MAGNESIUM HYDROXIDE 30 ML: 400 SUSPENSION ORAL at 22:24

## 2017-07-06 RX ADMIN — INSULIN LISPRO 3 UNITS: 100 INJECTION, SOLUTION INTRAVENOUS; SUBCUTANEOUS at 14:14

## 2017-07-06 RX ADMIN — SENNOSIDES AND DOCUSATE SODIUM 2 TABLET: 8.6; 5 TABLET ORAL at 00:38

## 2017-07-06 RX ADMIN — SENNOSIDES AND DOCUSATE SODIUM 2 TABLET: 8.6; 5 TABLET ORAL at 22:24

## 2017-07-06 RX ADMIN — FAMOTIDINE 20 MG: 20 TABLET, FILM COATED ORAL at 07:36

## 2017-07-06 ASSESSMENT — PAIN SCALES - GENERAL
PAINLEVEL_OUTOF10: 0
PAINLEVEL_OUTOF10: ASSUMED PAIN PRESENT

## 2017-07-06 NOTE — PROGRESS NOTES
[facility] Hospitalist Progress Note    Date of Service: 2017    Chief Complaint  77 y.o. male admitted 2017 transferred from [facility] with alcohol intoxication, ground level fall, intubated at [facility] facility, extubated here and required re-intubation and re-extubated on 7/3.    Interval Problem Update  Mr. Bynum was transferred to [facility] due to GLF while intoxicated requiring intubation. He was evaluated by the trauma team. He was successfully extubated and required re-intubation. He was re-extubated on 7/3. His nurse notes less agitated and has been more compliant with mobilization.  700 ml urine over night. He is on 2 liters [place] oxygen this morning. His wife has been calling from [employer].      Transferred from ICU. Pt has hoarseness and some expressive aphasia. Alert but not oriented to time place or person. Was placed in restraints for pulling on IV lines yesterday. Will have SLP evaluate. I will evaluate his encephalopathy with a CT head, ammonia and electrolytes. His Na is elevated at 149. Will start on free water flushes. PT/OT    Time spent: 35 minutes. > 50 % time spend providing counseling / co ordination of care.    Consultants/Specialty  Pulmonary     Disposition  PT/OT and SNF placement      Review of Systems   Unable to perform ROS     Physical Exam  Laboratory/Imaging   Hemodynamics  Temp (24hrs), Av.7 °C (98.1 °F), Min:36.3 °C (97.3 °F), Max:37.1 °C (98.7 °F)   Temperature: 36.4 °C (97.6 °F)  Pulse  Av.4  Min: 54  Max: 145 Heart Rate (Monitored): 94  Blood Pressure : 136/69 mmHg, NIBP: 139/63 mmHg      Respiratory      Respiration: 16, Pulse Oximetry: 94 %, O2 Daily Delivery Respiratory : Silicone Nasal Cannula     #MDI/DPI Given: MDI/DPI x 1, Work Of Breathing / Effort: Mild  RUL Breath Sounds: Fine Crackles, RML Breath Sounds: Fine Crackles, RLL Breath Sounds: Rhonchi, LION Breath Sounds: Fine Crackles, LLL Breath Sounds: Rhonchi    Fluids    Intake/Output  Summary (Last 24 hours) at 07/06/17 1014  Last data filed at 07/06/17 0730   Gross per 24 hour   Intake    960 ml   Output    250 ml   Net    710 ml       Nutrition  Orders Placed This Encounter   Procedures   • Diet NPO     Standing Status: Standing      Number of Occurrences: 1      Standing Expiration Date:      Order Specific Question:  Type:     Answer:  Now [1]     Order Specific Question:  Restrict to:     Answer:  Strict [1]     Physical Exam   Constitutional: No distress.   HENT:   Head: Normocephalic.   Right nares cortrak   Eyes: Conjunctivae are normal. Right eye exhibits no discharge. Left eye exhibits no discharge.   Neck: Neck supple.   Cardiovascular: Normal rate and regular rhythm.    No murmur heard.  Pulmonary/Chest: No respiratory distress. He has rales.   Abdominal: He exhibits distension. There is no tenderness. There is no rebound.   Musculoskeletal: He exhibits no edema.   Thenar atrophy.   Neurological: He is alert.   Not oriented to time place or person   Skin: Skin is warm and dry.   Psychiatric:   Confused   Nursing note and vitals reviewed.      Recent Labs      07/04/17   0430  07/05/17 0450 07/06/17 0456   WBC  11.9*  12.0*  13.6*   RBC  3.10*  3.39*  3.55*   HEMOGLOBIN  10.1*  10.8*  11.4*   HEMATOCRIT  31.0*  34.1*  35.1*   MCV  100.0*  100.6*  98.9*   MCH  32.6  31.9  32.1   MCHC  32.6*  31.7*  32.5*   RDW  50.0  50.4*  51.4*   PLATELETCT  473*  587*  642*   MPV  9.0  8.9*  9.1     Recent Labs      07/04/17   0430  07/05/17 0450  07/06/17   0456   SODIUM  143  146*  149*   POTASSIUM  3.8  3.9  3.4*   CHLORIDE  105  110  112   CO2  29  28  26   GLUCOSE  162*  199*  247*   BUN  46*  49*  58*   CREATININE  0.75  0.67  0.72   CALCIUM  9.3  9.6  9.9             Recent Labs      07/04/17   0430   TRIGLYCERIDE  226*          Assessment/Plan     Encephalopathy acute (present on admission)  Assessment & Plan  Severe, s/p significant detox, intubation and remains very confused and  agitated. On seroquel and restraints  Check CT head and ammonia. Check Mag, phos and calcium.      Aspiration pneumonitis (CMS-HCC)  Assessment & Plan  Extubated on 6/24 but required re-intubation and extubated again on 7/3.  BAL with pseudomonas and klebsiella  Completed 10 day course of zosyn    Respiratory failure (CMS-HCC)  Assessment & Plan  s/p intubation x2  Pulmonary consulted and following  Currently on 2L of oxygen  Continue RT protocol        Alcohol abuse (present on admission)  Assessment & Plan  Out of withdrawal window  Continue Folate, thiamine    Anemia (present on admission)  Assessment & Plan  Macrocytic  Will check  Folate, B12 and TSH  No sign of acute bleed.  Hemoglobin has been in the 8-10 range  Continue PPi    Hypokalemia (present on admission)  Assessment & Plan  Supplement with diuresis    Type 2 diabetes mellitus (CMS-HCC) (present on admission)  Assessment & Plan  Sliding scale insulin    Hypernatremia  Assessment & Plan  Will start free water flushes 300 mg Q4 hours  continue to monitor BMP      Labs reviewed, Medications reviewed and Radiology images reviewed        DVT Prophylaxis: Enoxaparin (Lovenox)

## 2017-07-06 NOTE — PROGRESS NOTES
Patient pulled out Cortrak while in bilateral wrist restraints.   MD aware, new order for replacement with bridle.

## 2017-07-06 NOTE — PROGRESS NOTES
Patient alert, but unable to orient d/t patient unable to answer questions appropriately.   Weaned off O2, tolerating room air. Lungs coarse, suction PRN.   Cortrak replaced today d/t patient pulling it out. Running tube feeds at goal.   Incontinent of urine.  CT/head today, see results.     Bilateral wrist restraints for line pulling.

## 2017-07-06 NOTE — CARE PLAN
Problem: Venous Thromboembolism (VTW)/Deep Vein Thrombosis (DVT) Prevention:  Goal: Patient will participate in Venous Thrombosis (VTE)/Deep Vein Thrombosis (DVT)Prevention Measures  Outcome: PROGRESSING AS EXPECTED  Patient wearing SCDs and receiving SQ lovenox.     Problem: Respiratory:  Goal: Respiratory status will improve  Outcome: PROGRESSING AS EXPECTED  Coarse lungs, tolerating room air. Suction as needed for secretions.

## 2017-07-06 NOTE — PROGRESS NOTES
10f Cortrak placed to left nare with Cassandra RN at bedside. Patient tolerated poorly, in restraints. Line secured at 68cm. Attempted to place bridle for securement but unable to place due to patient thrashing. Tape placed for securement. Abd xray for tube placement verification ordered.

## 2017-07-06 NOTE — PROGRESS NOTES
"Pt came here at approx. 1430. He rested and was restless in bed later in the afternoon. Pulled off condom cath and was grabbing down there a lot=not replaced. Repositioned pt x3 and changed wet disposable x2 and did cande care. Alert and oriented to self only. Wife called and he \"spoke\" with her on the phone.  "

## 2017-07-06 NOTE — RESPIRATORY CARE
COPD EDUCATION by COPD CLINICAL EDUCATOR  7/6/2017 at 7:25 AM by Hallie Mckeon     Patient reviewed by COPD education team. Patient does not qualify for COPD program.

## 2017-07-06 NOTE — PROGRESS NOTES
Received report from day RN. Assumed care for pt at around 1900. Assessed pt to be alert and oriented x1, able to answer yes/no questions but has difficulty enunciating words spoken. Pt noted to have irregular heart rhythm, connected to , barely audible heart rate with auscultation. Irregular radial pulse. First HR reading was 111, then rechecked after a couple of minutes and noted to be 55bpm. Pt seems calm but with some light headedness. BP normal otherwise. No noted cardiac history per notes. Denies chest pain and palpitations. Reported to [facility] hospitalist BRAN Vargas. Received orders for stat EKG. Pt recent transfer from [facility] and per report, pt was sinus rhythm prior to transfer. No noted past EKG on file. Will continue to monitor pt. Pt pulled IV catheter, obtained order for bilateral soft wrist restraints. Will continue to educate on discontinue criteria. Noted pt had grover catheter and pt has not voided yet. Obtain order for bladder scan protocol with PRN straight cath.

## 2017-07-06 NOTE — PROGRESS NOTES
Cortrak Placement    Tube Team verified patient name and medical record number prior to tube placement.  Cortrak tube (43 inches, 10 Norwegian) placed at 98 cm in left nare.  Per Cortrak picture, tube appears to be in the small bowel.  Nursing Instructions: Awaiting KUB to confirm placement before use for medications or feeding. Once placement confirmed, flush tube with 30 ml of water, and then remove and save stylet, in patient medication drawer.

## 2017-07-06 NOTE — THERAPY
Attempted to see the patient for dysphagia therapy on this date.  RN asked SLP to hold today as the patient is not following directives and is very confused. SLP will reattempt as the patient is appropriate/able.

## 2017-07-06 NOTE — DISCHARGE PLANNING
Medical Social Work    PC to pt's spouse, Dara ([phone]) to discuss SNF options. Dara adamant that pt is not transferred to SNF in [place], [place]. Dara prefers that pt be discharged home and spouse willing to arrange care needs in the home. Dara will be in the hospital tomorrow about 1pm and would like to talk to the doctor.     SW to notify MD and RN.

## 2017-07-06 NOTE — PROGRESS NOTES
Read EKG results to Dr. Goldstein to update about pt's condition. Noted VS to be stable, continued to have mild tachycardia. Radial pulse +2 with irregularities. Probably poor perfusion related. Pt denies chest pain, no palpitations, light headedness now resolved. Will continue to monitor. No new orders received. Pt pulled cortrak line before applying bilateral soft wrist restraint. Placed new cortrak to left nare, secured at 98cm. Pt on restraints now.

## 2017-07-07 ENCOUNTER — APPOINTMENT (OUTPATIENT)
Dept: RADIOLOGY | Facility: MEDICAL CENTER | Age: 77
DRG: 163 | End: 2017-07-07
Attending: INTERNAL MEDICINE
Payer: MEDICARE

## 2017-07-07 LAB
ANION GAP SERPL CALC-SCNC: 10 MMOL/L (ref 0–11.9)
BASOPHILS # BLD AUTO: 0.5 % (ref 0–1.8)
BASOPHILS # BLD: 0.06 K/UL (ref 0–0.12)
BUN SERPL-MCNC: 54 MG/DL (ref 8–22)
CALCIUM SERPL-MCNC: 9.7 MG/DL (ref 8.5–10.5)
CHLORIDE SERPL-SCNC: 112 MMOL/L (ref 96–112)
CO2 SERPL-SCNC: 27 MMOL/L (ref 20–33)
CREAT SERPL-MCNC: 0.79 MG/DL (ref 0.5–1.4)
EOSINOPHIL # BLD AUTO: 0.04 K/UL (ref 0–0.51)
EOSINOPHIL NFR BLD: 0.3 % (ref 0–6.9)
ERYTHROCYTE [DISTWIDTH] IN BLOOD BY AUTOMATED COUNT: 51 FL (ref 35.9–50)
GFR SERPL CREATININE-BSD FRML MDRD: >60 ML/MIN/1.73 M 2
GLUCOSE BLD-MCNC: 229 MG/DL (ref 65–99)
GLUCOSE BLD-MCNC: 232 MG/DL (ref 65–99)
GLUCOSE BLD-MCNC: 237 MG/DL (ref 65–99)
GLUCOSE BLD-MCNC: 261 MG/DL (ref 65–99)
GLUCOSE SERPL-MCNC: 244 MG/DL (ref 65–99)
HCT VFR BLD AUTO: 36.3 % (ref 42–52)
HGB BLD-MCNC: 11.7 G/DL (ref 14–18)
IMM GRANULOCYTES # BLD AUTO: 0.06 K/UL (ref 0–0.11)
IMM GRANULOCYTES NFR BLD AUTO: 0.5 % (ref 0–0.9)
LYMPHOCYTES # BLD AUTO: 1 K/UL (ref 1–4.8)
LYMPHOCYTES NFR BLD: 7.8 % (ref 22–41)
MCH RBC QN AUTO: 32 PG (ref 27–33)
MCHC RBC AUTO-ENTMCNC: 32.2 G/DL (ref 33.7–35.3)
MCV RBC AUTO: 99.2 FL (ref 81.4–97.8)
MONOCYTES # BLD AUTO: 0.67 K/UL (ref 0–0.85)
MONOCYTES NFR BLD AUTO: 5.2 % (ref 0–13.4)
NEUTROPHILS # BLD AUTO: 11 K/UL (ref 1.82–7.42)
NEUTROPHILS NFR BLD: 85.7 % (ref 44–72)
NRBC # BLD AUTO: 0 K/UL
NRBC BLD AUTO-RTO: 0 /100 WBC
PLATELET # BLD AUTO: 612 K/UL (ref 164–446)
PMV BLD AUTO: 9.2 FL (ref 9–12.9)
POTASSIUM SERPL-SCNC: 3.4 MMOL/L (ref 3.6–5.5)
RBC # BLD AUTO: 3.66 M/UL (ref 4.7–6.1)
SODIUM SERPL-SCNC: 149 MMOL/L (ref 135–145)
WBC # BLD AUTO: 12.8 K/UL (ref 4.8–10.8)

## 2017-07-07 PROCEDURE — 700102 HCHG RX REV CODE 250 W/ 637 OVERRIDE(OP): Performed by: INTERNAL MEDICINE

## 2017-07-07 PROCEDURE — 82962 GLUCOSE BLOOD TEST: CPT | Mod: 91

## 2017-07-07 PROCEDURE — 700111 HCHG RX REV CODE 636 W/ 250 OVERRIDE (IP): Performed by: INTERNAL MEDICINE

## 2017-07-07 PROCEDURE — 700102 HCHG RX REV CODE 250 W/ 637 OVERRIDE(OP): Performed by: HOSPITALIST

## 2017-07-07 PROCEDURE — 85025 COMPLETE CBC W/AUTO DIFF WBC: CPT

## 2017-07-07 PROCEDURE — 99233 SBSQ HOSP IP/OBS HIGH 50: CPT | Performed by: INTERNAL MEDICINE

## 2017-07-07 PROCEDURE — 95951 EEG: CPT | Mod: 52

## 2017-07-07 PROCEDURE — A9270 NON-COVERED ITEM OR SERVICE: HCPCS | Performed by: INTERNAL MEDICINE

## 2017-07-07 PROCEDURE — 80048 BASIC METABOLIC PNL TOTAL CA: CPT

## 2017-07-07 PROCEDURE — G8987 SELF CARE CURRENT STATUS: HCPCS | Mod: CM

## 2017-07-07 PROCEDURE — A9270 NON-COVERED ITEM OR SERVICE: HCPCS | Performed by: HOSPITALIST

## 2017-07-07 PROCEDURE — 700105 HCHG RX REV CODE 258: Performed by: INTERNAL MEDICINE

## 2017-07-07 PROCEDURE — 97166 OT EVAL MOD COMPLEX 45 MIN: CPT

## 2017-07-07 PROCEDURE — G8988 SELF CARE GOAL STATUS: HCPCS | Mod: CJ

## 2017-07-07 PROCEDURE — 306565 RIGID MIT RESTRAINT(PAIR): Performed by: INTERNAL MEDICINE

## 2017-07-07 PROCEDURE — 700102 HCHG RX REV CODE 250 W/ 637 OVERRIDE(OP)

## 2017-07-07 PROCEDURE — 770006 HCHG ROOM/CARE - MED/SURG/GYN SEMI*

## 2017-07-07 PROCEDURE — 36415 COLL VENOUS BLD VENIPUNCTURE: CPT

## 2017-07-07 PROCEDURE — A9270 NON-COVERED ITEM OR SERVICE: HCPCS

## 2017-07-07 RX ORDER — SIMVASTATIN 40 MG
40 TABLET ORAL EVERY EVENING
Status: DISCONTINUED | OUTPATIENT
Start: 2017-07-07 | End: 2017-07-12 | Stop reason: HOSPADM

## 2017-07-07 RX ORDER — OMEPRAZOLE 20 MG/1
20 CAPSULE, DELAYED RELEASE ORAL DAILY
Status: DISCONTINUED | OUTPATIENT
Start: 2017-07-07 | End: 2017-07-12 | Stop reason: HOSPADM

## 2017-07-07 RX ORDER — QUETIAPINE FUMARATE 25 MG/1
50 TABLET, FILM COATED ORAL EVERY EVENING
Status: DISCONTINUED | OUTPATIENT
Start: 2017-07-07 | End: 2017-07-12

## 2017-07-07 RX ORDER — GLIPIZIDE 2.5 MG/1
2.5 TABLET, EXTENDED RELEASE ORAL DAILY
Status: DISCONTINUED | OUTPATIENT
Start: 2017-07-07 | End: 2017-07-12 | Stop reason: HOSPADM

## 2017-07-07 RX ORDER — PANTOPRAZOLE SODIUM 40 MG/1
40 TABLET, DELAYED RELEASE ORAL DAILY
Status: DISCONTINUED | OUTPATIENT
Start: 2017-07-07 | End: 2017-07-07

## 2017-07-07 RX ORDER — ASPIRIN 81 MG/1
81 TABLET, CHEWABLE ORAL 2 TIMES DAILY
Status: DISCONTINUED | OUTPATIENT
Start: 2017-07-07 | End: 2017-07-12 | Stop reason: HOSPADM

## 2017-07-07 RX ORDER — SODIUM CHLORIDE 450 MG/100ML
INJECTION, SOLUTION INTRAVENOUS CONTINUOUS
Status: DISCONTINUED | OUTPATIENT
Start: 2017-07-07 | End: 2017-07-09

## 2017-07-07 RX ADMIN — FOLIC ACID 1 MG: 1 TABLET ORAL at 08:32

## 2017-07-07 RX ADMIN — INSULIN LISPRO 1 UNITS: 100 INJECTION, SOLUTION INTRAVENOUS; SUBCUTANEOUS at 23:51

## 2017-07-07 RX ADMIN — INSULIN LISPRO 2 UNITS: 100 INJECTION, SOLUTION INTRAVENOUS; SUBCUTANEOUS at 00:19

## 2017-07-07 RX ADMIN — ACETAMINOPHEN 650 MG: 325 TABLET, FILM COATED ORAL at 15:56

## 2017-07-07 RX ADMIN — THERA TABS 1 TABLET: TAB at 08:32

## 2017-07-07 RX ADMIN — ENOXAPARIN SODIUM 40 MG: 100 INJECTION SUBCUTANEOUS at 08:31

## 2017-07-07 RX ADMIN — LORAZEPAM 2 MG: 2 INJECTION INTRAMUSCULAR; INTRAVENOUS at 22:33

## 2017-07-07 RX ADMIN — FAMOTIDINE 20 MG: 20 TABLET, FILM COATED ORAL at 22:29

## 2017-07-07 RX ADMIN — GLIPIZIDE 2.5 MG: 2.5 TABLET, EXTENDED RELEASE ORAL at 17:54

## 2017-07-07 RX ADMIN — SENNOSIDES AND DOCUSATE SODIUM 2 TABLET: 8.6; 5 TABLET ORAL at 08:32

## 2017-07-07 RX ADMIN — QUETIAPINE FUMARATE 50 MG: 25 TABLET ORAL at 08:32

## 2017-07-07 RX ADMIN — ASPIRIN 81 MG: 81 TABLET, CHEWABLE ORAL at 15:56

## 2017-07-07 RX ADMIN — INSULIN LISPRO 2 UNITS: 100 INJECTION, SOLUTION INTRAVENOUS; SUBCUTANEOUS at 05:50

## 2017-07-07 RX ADMIN — QUETIAPINE FUMARATE 50 MG: 25 TABLET ORAL at 22:29

## 2017-07-07 RX ADMIN — INSULIN LISPRO 3 UNITS: 100 INJECTION, SOLUTION INTRAVENOUS; SUBCUTANEOUS at 17:58

## 2017-07-07 RX ADMIN — SODIUM CHLORIDE: 4.5 INJECTION, SOLUTION INTRAVENOUS at 19:04

## 2017-07-07 RX ADMIN — SIMVASTATIN 40 MG: 40 TABLET, FILM COATED ORAL at 22:29

## 2017-07-07 RX ADMIN — FAMOTIDINE 20 MG: 20 TABLET, FILM COATED ORAL at 08:32

## 2017-07-07 RX ADMIN — SENNOSIDES AND DOCUSATE SODIUM 2 TABLET: 8.6; 5 TABLET ORAL at 22:29

## 2017-07-07 RX ADMIN — Medication 100 MG: at 08:35

## 2017-07-07 RX ADMIN — SODIUM CHLORIDE: 4.5 INJECTION, SOLUTION INTRAVENOUS at 11:36

## 2017-07-07 RX ADMIN — INSULIN LISPRO 2 UNITS: 100 INJECTION, SOLUTION INTRAVENOUS; SUBCUTANEOUS at 11:39

## 2017-07-07 RX ADMIN — OMEPRAZOLE 20 MG: 20 CAPSULE, DELAYED RELEASE ORAL at 15:56

## 2017-07-07 ASSESSMENT — COGNITIVE AND FUNCTIONAL STATUS - GENERAL
TOILETING: TOTAL
EATING MEALS: TOTAL
SUGGESTED CMS G CODE MODIFIER DAILY ACTIVITY: CM
DAILY ACTIVITIY SCORE: 8
PERSONAL GROOMING: A LOT
DRESSING REGULAR UPPER BODY CLOTHING: A LOT
HELP NEEDED FOR BATHING: TOTAL
DRESSING REGULAR LOWER BODY CLOTHING: TOTAL

## 2017-07-07 ASSESSMENT — ACTIVITIES OF DAILY LIVING (ADL): TOILETING: UNABLE TO DETERMINE AT THIS TIME

## 2017-07-07 NOTE — THERAPY
"Occupational Therapy Evaluation completed.   Functional Status: Max x 2 supine to sit with log roll, max x 2 sit to supine, max x 2 sit to stand, total assist toileting in bed, total assist to don socks. Pt is lethargic but nodding yes/no and following simple one step commands.   Plan of Care: Will benefit from Occupational Therapy 3 times per week  Discharge Recommendations:  Equipment: Will Continue to Assess for Equipment Needs. Post-acute therapy Discharge to a transitional care facility for continued skilled therapy services.    See \"Rehab Therapy-Acute\" Patient Summary Report for complete documentation.    "

## 2017-07-07 NOTE — PROGRESS NOTES
Assessed to be alert and oriented to self. Pt has expressive aphasia, per report CT was not indicative of any changes. Pt continues to be fatigued, frequent attempts to pull lines and cortrak. Educated on indications and need for tube feeding. Obtained order for wrist restraints, previous order . Provided oral care and suctioning. Instructed pt with proper coughing technique, pt has weak cough and unable to expectorate secretions. Lung sounds congested. Maintained aspiration precautions. Tolerating tube feedings. Q2hrs turning done. Continued to be incontinent. Administered milk of magnesia for constipation. Kept safe and comfortable and provided needs.

## 2017-07-07 NOTE — CARE PLAN
Problem: Safety  Goal: Will remain free from falls  Intervention: Assess risk factors for falls    07/06/17 2000   OTHER   Fall Risk High Risk to Fall - 2 or more points    Risk for Injury-Any positive answers results in the pt being at high risk for fall related injury Alcohol Abuse   Mobility Status Assessment 2-2 Healthcare Providers Required for Assistance with Ambulation & Transfer   History of fall 2   Date of Last Fall 06/23/17   Pt Calls for Assistance No       Intervention: Implement fall precautions    07/05/17 0800 07/06/17 2000   OTHER   Environmental Precautions --  Treaded Slipper Socks on Patient   IV Pole on Same Side of Bed as Bathroom --  Yes   Bedrails --  Alternative to Bedrails Used   Chair/Bed Strip Alarm --  Yes - Alarm On   Bed Alarm (Built in - for ICU ONLY) Yes - Alarm On --            Problem: Venous Thromboembolism (VTW)/Deep Vein Thrombosis (DVT) Prevention:  Goal: Patient will participate in Venous Thrombosis (VTE)/Deep Vein Thrombosis (DVT)Prevention Measures  Outcome: PROGRESSING AS EXPECTED    07/06/17 2000   OTHER   Risk Assessment Score 4   VTE RISK High   Mechanical Prophylaxis SCDs, Sequentials (Intermittent Pneumatic Compression Devices)   Pharmacologic Prophylaxis Used LMWH: Enoxaparin(Lovenox)         07/06/17 2000   OTHER   Risk Assessment Score 4   VTE RISK High   Mechanical Prophylaxis SCDs, Sequentials (Intermittent Pneumatic Compression Devices)   Pharmacologic Prophylaxis Used LMWH: Enoxaparin(Lovenox)         Problem: Respiratory:  Goal: Respiratory status will improve  Intervention: Assess and monitor pulmonary status    07/04/17 0400 07/06/17 2000 07/07/17 0000   OTHER   Respiration --  --  16   Pulse Oximetry --  --  --    O2 (LPM) --  --  --    Work Of Breathing / Effort --  Mild --    Pre/Post Intervention Post Intervention Assessment --  --    RUL Breath Sounds --  Fine Crackles --    RML Breath Sounds --  Fine Crackles --    RLL Breath Sounds --  Rhonchi --     LION Breath Sounds --  Fine Crackles --    LLL Breath Sounds --  Rhonchi --    Respiratory   Cough --  Congested;Weak --      07/07/17 0100   OTHER   Respiration --    Pulse Oximetry 95 %   O2 (LPM) 2   Work Of Breathing / Effort --    Pre/Post Intervention --    RUL Breath Sounds --    RML Breath Sounds --    RLL Breath Sounds --    LION Breath Sounds --    LLL Breath Sounds --    Respiratory   Cough --    Encouraged to do deep breathing exercises, Pt unable to do proper coughing, coached.

## 2017-07-07 NOTE — CARE PLAN
Problem: Nutritional:  Goal: Nutrition support tolerated and meeting greater than 85% of estimated needs  Outcome: MET Date Met:  07/07/17

## 2017-07-07 NOTE — EEG PROGRESS NOTE
VIDEO ELECTROENCEPHALOGRAM REPORT      Referring MD: Dr. Dias.     DOS:  7/7/2017 (total recording of 25 minutes).     INDICATION:  Xavier Bynum 77 y.o. male presenting with encephalopathy. Rule out seizures.     CURRENT ANTIEPILEPTIC REGIMEN: None.     TECHNIQUE: 30 channel video electroencephalogram (EEG) was performed in accordance with the international 10-20 system. The study was reviewed in bipolar and referential montages. The recording examined the patient during wakeful and drowsy state(s).     DESCRIPTION OF THE RECORD:  During the wakefulness, the background showed a symmetrical 6 Hz theta activity posteriorly with amplitude of 70 mV.  There was reactivity to eye closure/opening.  A normal anterior-posterior gradient was noted with faster beta frequencies seen anteriorly.  During drowsiness, theta/delta frequencies were seen.      ACTIVATION PROCEDURES:   Not performed.       ICTAL AND/OR INTERICTAL FINDINGS:   No focal or generalized epileptiform activity noted. No regional slowing was seen during this routine study.  No clinical events or seizures were reported or recorded during the study.     EKG: sampling of the EKG recording demonstrated sinus rhythm.      INTERPRETATION:  This is an abnormal video EEG recording in the awake and drowsy state(s). Mild to moderate diffuse cerebral dysfunction, suggestive of an encephalopathic state. No seizures were captured. Clinical correlation is recommended.        Cornelio Elder MD  Medical Director, Epilepsy and Neurodiagnostics.   Clinical  of Neurology [employer].   Diplomate in Neurology, Epilepsy, and Electrodiagnostic Medicine.   Office: [phone]  Fax: [phone]

## 2017-07-07 NOTE — DIETARY
Nutrition support weekly update.  Day 14 of admit.  76 yo male admitted following GLF, +ETOH, aspiration pneumonia. He failed SLP evaluation and was started on tube feeding on 6/23.      Assessment:  1) Impact 1.5 TF at goal rate 50 ml/hr. Will change TF formula and rate - no longer in need of speciality formula.  2) last documented weight 7/4 was 74.7 kg which is decreased 9.2 kg since admit scale weight of 83.9 kg. Weight loss expected with lengthy hospitalization, immobility and loss of lean muscle mass.   3) skin: no skin breakdown noted   4) prealbumin 20 is increased from 9 since last week and trending with CRP 10.01 decreased from 22.64 last week - indicating decreased inflammation.     Recommendations/Plan:  1) change TF formula to Diabetisource with goal 70 ml/hr to provide 2016 kcals, 101 g protein, 1364 ml H20/day  2) monitor daily weights for adequacy of nutrition and fluid balance  3) po diet when appropriate/safe

## 2017-07-07 NOTE — PROGRESS NOTES
Pt pulled cortrak at around 0600 despite having bilateral soft wrist restraints. May need mittens. Replaced cortrak and secured at 93cm. Placed order for xray for placement confirmation.

## 2017-07-07 NOTE — PROGRESS NOTES
Cortrak Placement    Tube Team verified patient name and medical record number prior to tube placement.  Cortrak tube (43 inches, 10 American) placed at 93 cm in left nare.  Per Cortrak picture, tube appears to be in the small bowel.  Nursing Instructions: Awaiting KUB to confirm placement before use for medications or feeding. Once placement confirmed, flush tube with 30 ml of water, and then remove and save stylet, in patient medication drawer.

## 2017-07-07 NOTE — PROGRESS NOTES
[facility] Hospitalist Progress Note    Date of Service: 2017    Chief Complaint  77 y.o. male admitted 2017 transferred from [facility] with alcohol intoxication, ground level fall, intubated at [facility] facility, extubated here and required re-intubation and re-extubated on 7/3.    Interval Problem Update     Transferred from ICU. Pt has hoarseness and some expressive aphasia. Alert but not oriented to time place or person. Was placed in restraints for pulling on IV lines yesterday. Will have SLP evaluate. I will evaluate his encephalopathy with a CT head, ammonia and electrolytes. His Na is elevated at 149. Will start on free water flushes. PT/OT/ST     Remains confused and agitated. Somnolent during the day, will decrease Seroquel to 50 mg QHS. CT head negative for acute process, ammonia 41. Will check EEG to further evaluate his encephalopathy. Na remains elevated. Will start on .45 NS.     Consultants/Specialty  Pulmonary     Disposition  PT/OT/ST and SNF placement      Review of Systems   Unable to perform ROS     Physical Exam  Laboratory/Imaging   Hemodynamics  Temp (24hrs), Av.7 °C (98 °F), Min:36.2 °C (97.2 °F), Max:37.2 °C (99 °F)   Temperature: 37.1 °C (98.8 °F)  Pulse  Av.7  Min: 54  Max: 145    Blood Pressure : 134/79 mmHg      Respiratory      Respiration: 20, Pulse Oximetry: 91 %     Work Of Breathing / Effort: Mild  RUL Breath Sounds: Fine Crackles, RML Breath Sounds: Fine Crackles, RLL Breath Sounds: Rhonchi, LION Breath Sounds: Fine Crackles, LLL Breath Sounds: Rhonchi    Fluids    Intake/Output Summary (Last 24 hours) at 17 0915  Last data filed at 17 0600   Gross per 24 hour   Intake   2375 ml   Output      0 ml   Net   2375 ml       Nutrition  Orders Placed This Encounter   Procedures   • Diet NPO     Standing Status: Standing      Number of Occurrences: 1      Standing Expiration Date:      Order Specific Question:  Type:     Answer:  Now [1]      Order Specific Question:  Restrict to:     Answer:  Strict [1]     Physical Exam   Constitutional: No distress.   HENT:   Head: Normocephalic.   Right nares cortrak   Eyes: Conjunctivae are normal. Right eye exhibits no discharge. Left eye exhibits no discharge.   Neck: Neck supple.   Cardiovascular: Normal rate and regular rhythm.    No murmur heard.  Pulmonary/Chest: No respiratory distress. He has rales.   Abdominal: He exhibits distension. There is no tenderness. There is no rebound.   Musculoskeletal: He exhibits no edema.   Thenar atrophy.   Neurological: He is alert.   Not oriented to time place or person   Skin: Skin is warm and dry.   Psychiatric:   Confused   Nursing note and vitals reviewed.      Recent Labs      07/05/17 0450 07/06/17 0456 07/07/17 0432   WBC  12.0*  13.6*  12.8*   RBC  3.39*  3.55*  3.66*   HEMOGLOBIN  10.8*  11.4*  11.7*   HEMATOCRIT  34.1*  35.1*  36.3*   MCV  100.6*  98.9*  99.2*   MCH  31.9  32.1  32.0   MCHC  31.7*  32.5*  32.2*   RDW  50.4*  51.4*  51.0*   PLATELETCT  587*  642*  612*   MPV  8.9*  9.1  9.2     Recent Labs      07/05/17 0450 07/06/17 0456 07/07/17   0432   SODIUM  146*  149*  149*   POTASSIUM  3.9  3.4*  3.4*   CHLORIDE  110  112  112   CO2  28  26  27   GLUCOSE  199*  247*  244*   BUN  49*  58*  54*   CREATININE  0.67  0.72  0.79   CALCIUM  9.6  9.9  9.7                      Assessment/Plan     Encephalopathy acute (present on admission)  Assessment & Plan  Severe and persistent, s/p significant alcohol detox, intubation and remains very confused and agitated. On seroquel and restraints  Decrease Seroquel to 50 mg QHS  CT head negative for acute process, reveals atrophy and microvascular ischemic changes  Ammonia WNL  Check EEG for occult seizures      Hypernatremia  Assessment & Plan  Persistent  Start on 0.45 NS  Continue free water flushes 300 mg Q4 hours  continue to monitor BMP    Aspiration pneumonitis (CMS-HCC)  Assessment & Plan  Extubated on  6/24 but required re-intubation and extubated again on 7/3.  BAL with pseudomonas and klebsiella  Completed 10 day course of zosyn    Respiratory failure (CMS-HCC) (present on admission)  Assessment & Plan  s/p intubation x2  Pulmonary consulted and following  Currently on 2L of oxygen  Continue RT protocol        Alcohol abuse (present on admission)  Assessment & Plan  Out of withdrawal window  Continue Folate, thiamine    Anemia (present on admission)  Assessment & Plan  Macrocytic  Folate, B12 and TSH WNL  No sign of acute bleed.  Hemoglobin has been in the 8-10 range  Continue PPi    Hypokalemia (present on admission)  Assessment & Plan  Supplement with diuresis    Type 2 diabetes mellitus (CMS-HCC) (present on admission)  Assessment & Plan  Sliding scale insulin      Labs reviewed, Medications reviewed and Radiology images reviewed        DVT Prophylaxis: Enoxaparin (Lovenox)

## 2017-07-07 NOTE — PROGRESS NOTES
Notified  that pt's wife and family are at bedside and would like to speak with him regarding pt's condition.

## 2017-07-08 ENCOUNTER — APPOINTMENT (OUTPATIENT)
Dept: RADIOLOGY | Facility: MEDICAL CENTER | Age: 77
DRG: 163 | End: 2017-07-08
Attending: INTERNAL MEDICINE
Payer: MEDICARE

## 2017-07-08 LAB
ANION GAP SERPL CALC-SCNC: 10 MMOL/L (ref 0–11.9)
BASOPHILS # BLD AUTO: 0.8 % (ref 0–1.8)
BASOPHILS # BLD: 0.08 K/UL (ref 0–0.12)
BUN SERPL-MCNC: 48 MG/DL (ref 8–22)
CALCIUM SERPL-MCNC: 9 MG/DL (ref 8.5–10.5)
CHLORIDE SERPL-SCNC: 112 MMOL/L (ref 96–112)
CO2 SERPL-SCNC: 26 MMOL/L (ref 20–33)
CREAT SERPL-MCNC: 0.73 MG/DL (ref 0.5–1.4)
EOSINOPHIL # BLD AUTO: 0.14 K/UL (ref 0–0.51)
EOSINOPHIL NFR BLD: 1.3 % (ref 0–6.9)
ERYTHROCYTE [DISTWIDTH] IN BLOOD BY AUTOMATED COUNT: 52 FL (ref 35.9–50)
GFR SERPL CREATININE-BSD FRML MDRD: >60 ML/MIN/1.73 M 2
GLUCOSE BLD-MCNC: 174 MG/DL (ref 65–99)
GLUCOSE BLD-MCNC: 202 MG/DL (ref 65–99)
GLUCOSE BLD-MCNC: 205 MG/DL (ref 65–99)
GLUCOSE BLD-MCNC: 217 MG/DL (ref 65–99)
GLUCOSE SERPL-MCNC: 207 MG/DL (ref 65–99)
HCT VFR BLD AUTO: 32.5 % (ref 42–52)
HGB BLD-MCNC: 10.3 G/DL (ref 14–18)
IMM GRANULOCYTES # BLD AUTO: 0.06 K/UL (ref 0–0.11)
IMM GRANULOCYTES NFR BLD AUTO: 0.6 % (ref 0–0.9)
LYMPHOCYTES # BLD AUTO: 1.46 K/UL (ref 1–4.8)
LYMPHOCYTES NFR BLD: 13.8 % (ref 22–41)
MCH RBC QN AUTO: 32.2 PG (ref 27–33)
MCHC RBC AUTO-ENTMCNC: 31.7 G/DL (ref 33.7–35.3)
MCV RBC AUTO: 101.6 FL (ref 81.4–97.8)
MONOCYTES # BLD AUTO: 0.61 K/UL (ref 0–0.85)
MONOCYTES NFR BLD AUTO: 5.8 % (ref 0–13.4)
NEUTROPHILS # BLD AUTO: 8.25 K/UL (ref 1.82–7.42)
NEUTROPHILS NFR BLD: 77.7 % (ref 44–72)
NRBC # BLD AUTO: 0 K/UL
NRBC BLD AUTO-RTO: 0 /100 WBC
PLATELET # BLD AUTO: 488 K/UL (ref 164–446)
PMV BLD AUTO: 9.7 FL (ref 9–12.9)
POTASSIUM SERPL-SCNC: 3.5 MMOL/L (ref 3.6–5.5)
RBC # BLD AUTO: 3.2 M/UL (ref 4.7–6.1)
SODIUM SERPL-SCNC: 148 MMOL/L (ref 135–145)
WBC # BLD AUTO: 10.6 K/UL (ref 4.8–10.8)

## 2017-07-08 PROCEDURE — A9270 NON-COVERED ITEM OR SERVICE: HCPCS | Performed by: INTERNAL MEDICINE

## 2017-07-08 PROCEDURE — 82962 GLUCOSE BLOOD TEST: CPT | Mod: 91

## 2017-07-08 PROCEDURE — 700105 HCHG RX REV CODE 258: Performed by: INTERNAL MEDICINE

## 2017-07-08 PROCEDURE — 700111 HCHG RX REV CODE 636 W/ 250 OVERRIDE (IP): Performed by: INTERNAL MEDICINE

## 2017-07-08 PROCEDURE — 700102 HCHG RX REV CODE 250 W/ 637 OVERRIDE(OP)

## 2017-07-08 PROCEDURE — 700102 HCHG RX REV CODE 250 W/ 637 OVERRIDE(OP): Performed by: INTERNAL MEDICINE

## 2017-07-08 PROCEDURE — 92526 ORAL FUNCTION THERAPY: CPT

## 2017-07-08 PROCEDURE — 700102 HCHG RX REV CODE 250 W/ 637 OVERRIDE(OP): Performed by: HOSPITALIST

## 2017-07-08 PROCEDURE — 85025 COMPLETE CBC W/AUTO DIFF WBC: CPT

## 2017-07-08 PROCEDURE — A9270 NON-COVERED ITEM OR SERVICE: HCPCS | Performed by: HOSPITALIST

## 2017-07-08 PROCEDURE — 99233 SBSQ HOSP IP/OBS HIGH 50: CPT | Performed by: INTERNAL MEDICINE

## 2017-07-08 PROCEDURE — 36415 COLL VENOUS BLD VENIPUNCTURE: CPT

## 2017-07-08 PROCEDURE — 80048 BASIC METABOLIC PNL TOTAL CA: CPT

## 2017-07-08 PROCEDURE — 770006 HCHG ROOM/CARE - MED/SURG/GYN SEMI*

## 2017-07-08 PROCEDURE — A9270 NON-COVERED ITEM OR SERVICE: HCPCS

## 2017-07-08 RX ADMIN — ASPIRIN 81 MG: 81 TABLET, CHEWABLE ORAL at 02:47

## 2017-07-08 RX ADMIN — Medication 100 MG: at 09:00

## 2017-07-08 RX ADMIN — ASPIRIN 81 MG: 81 TABLET, CHEWABLE ORAL at 16:16

## 2017-07-08 RX ADMIN — TIOTROPIUM BROMIDE 1 CAPSULE: 18 CAPSULE ORAL; RESPIRATORY (INHALATION) at 09:00

## 2017-07-08 RX ADMIN — FAMOTIDINE 20 MG: 20 TABLET, FILM COATED ORAL at 10:19

## 2017-07-08 RX ADMIN — ENOXAPARIN SODIUM 40 MG: 100 INJECTION SUBCUTANEOUS at 10:03

## 2017-07-08 RX ADMIN — LORAZEPAM 2 MG: 2 INJECTION INTRAMUSCULAR; INTRAVENOUS at 20:37

## 2017-07-08 RX ADMIN — SIMVASTATIN 40 MG: 40 TABLET, FILM COATED ORAL at 20:40

## 2017-07-08 RX ADMIN — THERA TABS 1 TABLET: TAB at 10:19

## 2017-07-08 RX ADMIN — SODIUM CHLORIDE: 4.5 INJECTION, SOLUTION INTRAVENOUS at 17:50

## 2017-07-08 RX ADMIN — INSULIN LISPRO 2 UNITS: 100 INJECTION, SOLUTION INTRAVENOUS; SUBCUTANEOUS at 06:00

## 2017-07-08 RX ADMIN — INSULIN LISPRO 2 UNITS: 100 INJECTION, SOLUTION INTRAVENOUS; SUBCUTANEOUS at 12:29

## 2017-07-08 RX ADMIN — OMEPRAZOLE 20 MG: 20 CAPSULE, DELAYED RELEASE ORAL at 10:19

## 2017-07-08 RX ADMIN — INSULIN LISPRO 2 UNITS: 100 INJECTION, SOLUTION INTRAVENOUS; SUBCUTANEOUS at 17:51

## 2017-07-08 RX ADMIN — QUETIAPINE FUMARATE 50 MG: 25 TABLET ORAL at 20:40

## 2017-07-08 RX ADMIN — SODIUM CHLORIDE: 4.5 INJECTION, SOLUTION INTRAVENOUS at 02:47

## 2017-07-08 RX ADMIN — SODIUM CHLORIDE: 4.5 INJECTION, SOLUTION INTRAVENOUS at 10:43

## 2017-07-08 RX ADMIN — FAMOTIDINE 20 MG: 20 TABLET, FILM COATED ORAL at 20:40

## 2017-07-08 RX ADMIN — FOLIC ACID 1 MG: 1 TABLET ORAL at 10:19

## 2017-07-08 ASSESSMENT — PAIN SCALES - GENERAL
PAINLEVEL_OUTOF10: 0

## 2017-07-08 NOTE — PROGRESS NOTES
[facility] Hospitalist Progress Note    Date of Service: 2017    Chief Complaint  77 y.o. male admitted 2017 transferred from [facility] with alcohol intoxication, ground level fall, intubated at [facility] facility, extubated here and required re-intubation and re-extubated on 7/3.    Interval Problem Update     Transferred from ICU. Pt has hoarseness and some expressive aphasia. Alert but not oriented to time place or person. Was placed in restraints for pulling on IV lines yesterday. Will have SLP evaluate. I will evaluate his encephalopathy with a CT head, ammonia and electrolytes. His Na is elevated at 149. Will start on free water flushes. PT/OT/ST     Remains confused and agitated. Somnolent during the day, will decrease Seroquel to 50 mg QHS. CT head negative for acute process, ammonia 41. Will check EEG to further evaluate his encephalopathy. Na remains elevated. Will start on .45 NS.      More awake, per wife he is oriented to person and place. Pulled cortrak out yesterday. Wife is complaining about poor oral hygiene, I have reassured her that we will do a better job with that. EEG does not reveal any occult seizures. Awaiting ST evaluation to see if patient can swallow safely. Ambulate with nursing staff and PT/OT. Na down to 148, patient appears dry will increase free water flushes and .45 NS.     Time spent: 35 minutes. > 50 % time spend providing counseling / co ordination of care.      Consultants/Specialty  Pulmonary     Disposition  PT/OT/ST and SNF placement      Review of Systems   Unable to perform ROS     Physical Exam  Laboratory/Imaging   Hemodynamics  Temp (24hrs), Av.7 °C (98 °F), Min:36.2 °C (97.1 °F), Max:37.3 °C (99.1 °F)   Temperature: 37.3 °C (99.1 °F)  Pulse  Av.7  Min: 54  Max: 145    Blood Pressure : 137/69 mmHg      Respiratory      Respiration: 18, Pulse Oximetry: 98 %     Work Of Breathing / Effort: Mild  RUL Breath Sounds: Fine Crackles, RML  Breath Sounds: Fine Crackles, RLL Breath Sounds: Rhonchi, LION Breath Sounds: Fine Crackles, LLL Breath Sounds: Rhonchi    Fluids    Intake/Output Summary (Last 24 hours) at 07/08/17 0916  Last data filed at 07/08/17 0700   Gross per 24 hour   Intake   5160 ml   Output    800 ml   Net   4360 ml       Nutrition  Orders Placed This Encounter   Procedures   • Diet NPO     Standing Status: Standing      Number of Occurrences: 1      Standing Expiration Date:      Order Specific Question:  Type:     Answer:  Now [1]     Order Specific Question:  Restrict to:     Answer:  Strict [1]     Physical Exam   Constitutional: No distress.   HENT:   Head: Normocephalic.   Oropharyngeal mucosa dry   Eyes: Conjunctivae are normal. Right eye exhibits no discharge. Left eye exhibits no discharge.   Neck: Neck supple.   Cardiovascular: Normal rate and regular rhythm.    No murmur heard.  Pulmonary/Chest: No respiratory distress. He has rales.   Abdominal: He exhibits distension. There is no tenderness. There is no rebound.   Musculoskeletal: He exhibits no edema.   Thenar atrophy.   Neurological: He is alert.   Not oriented to time place or person   Skin: Skin is warm and dry.   Psychiatric:   Confused   Nursing note and vitals reviewed.      Recent Labs      07/06/17   0456  07/07/17   0432  07/08/17   0504   WBC  13.6*  12.8*  10.6   RBC  3.55*  3.66*  3.20*   HEMOGLOBIN  11.4*  11.7*  10.3*   HEMATOCRIT  35.1*  36.3*  32.5*   MCV  98.9*  99.2*  101.6*   MCH  32.1  32.0  32.2   MCHC  32.5*  32.2*  31.7*   RDW  51.4*  51.0*  52.0*   PLATELETCT  642*  612*  488*   MPV  9.1  9.2  9.7     Recent Labs      07/06/17   0456  07/07/17   0432  07/08/17   0504   SODIUM  149*  149*  148*   POTASSIUM  3.4*  3.4*  3.5*   CHLORIDE  112  112  112   CO2  26  27  26   GLUCOSE  247*  244*  207*   BUN  58*  54*  48*   CREATININE  0.72  0.79  0.73   CALCIUM  9.9  9.7  9.0                      Assessment/Plan     Encephalopathy acute (present on  admission)  Assessment & Plan  Improving  s/p significant alcohol detox, intubation and remains very confused and agitated. On seroquel and restraints  Decrease Seroquel to 50 mg QHS  CT head negative for acute process, reveals atrophy and microvascular ischemic changes  Ammonia WNL  EEG reveals Mild to moderate diffuse cerebral dysfunction, suggestive of an encephalopathic state  BUN elevated, likely secondary to dehydration, increase free water and 0.45 NS      Hypernatremia  Assessment & Plan  Slow improvement  Increase 0.45 NS to 125  Increase free water flushes to 400 mg Q4 hours  continue to monitor BMP    Aspiration pneumonitis (CMS-HCC) (present on admission)  Assessment & Plan  Extubated on 6/24 but required re-intubation and extubated again on 7/3.  BAL with pseudomonas and klebsiella  Completed 10 day course of zosyn    Respiratory failure (CMS-HCC) (present on admission)  Assessment & Plan  s/p intubation x2  Pulmonary consulted and following  Currently on 2L of oxygen  Continue RT protocol        Alcohol abuse (present on admission)  Assessment & Plan  Out of withdrawal window  Continue Folate, thiamine    Anemia (present on admission)  Assessment & Plan  Macrocytic  Folate, B12 and TSH WNL  No sign of acute bleed.  Hemoglobin has been in the 8-10 range  Continue PPi    Hypokalemia (present on admission)  Assessment & Plan  Supplement with diuresis    Type 2 diabetes mellitus (CMS-HCC) (present on admission)  Assessment & Plan  Sliding scale insulin      Labs reviewed, Medications reviewed and Radiology images reviewed        DVT Prophylaxis: Enoxaparin (Lovenox)

## 2017-07-08 NOTE — PROGRESS NOTES
"Upon initial assessment pt lethargic and confused. A&O x1, talks occasionally and smiled at times when wife and family at bedside. After pulling out CT pt taken out of restraints for about 2 hrs w/ family, appeared content, but still would not follow commands.    Once CT re-inserted by charge RN, Phuc, and restraints reapplied pt began getting agitated, moving legs off bed and resisting restraints. Re-positioned and straightened, and took a small swing at this RN than stated \"get me out of here, just kill me.\" Medicated w/ IV ativan, pt calm and asleep nearly 30 minutes later.    HOB >30 degrees while feeding, Diabetisource restarted around 2230, no residual, flushing w/ 350mLs Q 4 hrs. Mouth care Q 2hrs d/t NPO status. Turning pt Q 2 hrs, feet/heels floated on pillows, SCDs in place, condom catheter applied.    Call light w/in reach, d/t restraints pt unable to call, checking for CMS and comfort Q 2 hrs, yellow-treaded socks applied, appropriate signs at doorway, hourly rounding continued.   "

## 2017-07-08 NOTE — PROGRESS NOTES
Received report from night shift RN, assumed care. Pt. Is awake, on bed. A&Ox4, oriented to self and place. x2 assist with turning and repositioning.  Pt. denies pain. Due medications given via cortrak, tolerated well, free water flushes given every 4 hrs. On continuous IV infusion at 150 cc/hr , tolerating well. Pt. On 2L O2 via NC, tolerating well. Plan of care was safety, comfort and rest. Discussed plan of care. Bed alarm in use, call light and personal belongings within reach, bed kept low, treaded socks on. Assisted as necessary. Kept rested and comfortable at all times.    Family at bedside. Noticed wife giving hydrating patient using wet mouth swabs. Pt. And wife educated about the risk for aspiration. Will continue monitoring,  in use, suction at bedside, IS of 500 ml.

## 2017-07-08 NOTE — THERAPY
"Speech Language Therapy dysphagia treatment completed.   Functional Status:  Pt gurgly at rest and spouse demos how he's 'drinking' thin liquids from a toothette (water as well as grape juice container) at bedside.  Contraindicated and spouse is resistant to eductation re s/s of aspiration, gurgly vocal quality, inability to clear gurlgy voice, and danger of giving thin liquids.    Recommendations: FEES ordered; will attempt to complete asap   Plan of Care: Will benefit from Speech Therapy 3 times per week  Post-Acute Therapy: Discharge to a transitional care facility for continued skilled therapy services.    See \"Rehab Therapy-Acute\" Patient Summary Report for complete documentation.     "

## 2017-07-08 NOTE — PROGRESS NOTES
"Pt pulled cortrak again despite having bilateral soft wrist restraints. Obtained order from Dr. Mayorga to add mitten restraints. Pt minimally verbal, with difficulty enunciating words. Pt's spouse at bedside. Per wife, pt has had problems with his swallowing and his speaking prior to this event and that they already consulted ENT specialist about it. Pt's wife stated \"I'm pretty sure he could eat if only we get him up in the chair and offer him food.\" She also added that pt has had difficulty ambulating before and that she will be able to find help for him if he discharges back home. She understands that the patient needs evaluation from ST and PT first. Will followup with ST and PT. Will reinsert cortrak.  "

## 2017-07-08 NOTE — PROGRESS NOTES
Cortrak Placement    Tube Team verified patient name and medical record number prior to tube placement.  Cortrak tube (43 inches, 10 Dominican) placed at 95 cm in left nare.  Per Cortrak picture, tube appears to be in the small bowel.  Nursing Instructions: Awaiting KUB to confirm placement before use for medications or feeding. Once placement confirmed, flush tube with 30 ml of water, and then remove and save stylet, in patient medication drawer.

## 2017-07-09 ENCOUNTER — APPOINTMENT (OUTPATIENT)
Dept: RADIOLOGY | Facility: MEDICAL CENTER | Age: 77
DRG: 163 | End: 2017-07-09
Attending: INTERNAL MEDICINE
Payer: MEDICARE

## 2017-07-09 PROBLEM — R13.10 DYSPHAGIA: Status: ACTIVE | Noted: 2017-07-09

## 2017-07-09 LAB
ANION GAP SERPL CALC-SCNC: 5 MMOL/L (ref 0–11.9)
BASOPHILS # BLD AUTO: 0.5 % (ref 0–1.8)
BASOPHILS # BLD: 0.04 K/UL (ref 0–0.12)
BUN SERPL-MCNC: 29 MG/DL (ref 8–22)
CALCIUM SERPL-MCNC: 8.4 MG/DL (ref 8.5–10.5)
CHLORIDE SERPL-SCNC: 107 MMOL/L (ref 96–112)
CO2 SERPL-SCNC: 27 MMOL/L (ref 20–33)
CREAT SERPL-MCNC: 0.5 MG/DL (ref 0.5–1.4)
EOSINOPHIL # BLD AUTO: 0.22 K/UL (ref 0–0.51)
EOSINOPHIL NFR BLD: 2.8 % (ref 0–6.9)
ERYTHROCYTE [DISTWIDTH] IN BLOOD BY AUTOMATED COUNT: 47.8 FL (ref 35.9–50)
GFR SERPL CREATININE-BSD FRML MDRD: >60 ML/MIN/1.73 M 2
GLUCOSE BLD-MCNC: 159 MG/DL (ref 65–99)
GLUCOSE BLD-MCNC: 172 MG/DL (ref 65–99)
GLUCOSE BLD-MCNC: 190 MG/DL (ref 65–99)
GLUCOSE BLD-MCNC: 197 MG/DL (ref 65–99)
GLUCOSE SERPL-MCNC: 163 MG/DL (ref 65–99)
HCT VFR BLD AUTO: 29.4 % (ref 42–52)
HGB BLD-MCNC: 9.4 G/DL (ref 14–18)
IMM GRANULOCYTES # BLD AUTO: 0.05 K/UL (ref 0–0.11)
IMM GRANULOCYTES NFR BLD AUTO: 0.6 % (ref 0–0.9)
LYMPHOCYTES # BLD AUTO: 1.18 K/UL (ref 1–4.8)
LYMPHOCYTES NFR BLD: 15.1 % (ref 22–41)
MCH RBC QN AUTO: 31.6 PG (ref 27–33)
MCHC RBC AUTO-ENTMCNC: 32 G/DL (ref 33.7–35.3)
MCV RBC AUTO: 99 FL (ref 81.4–97.8)
MONOCYTES # BLD AUTO: 0.4 K/UL (ref 0–0.85)
MONOCYTES NFR BLD AUTO: 5.1 % (ref 0–13.4)
NEUTROPHILS # BLD AUTO: 5.94 K/UL (ref 1.82–7.42)
NEUTROPHILS NFR BLD: 75.9 % (ref 44–72)
NRBC # BLD AUTO: 0 K/UL
NRBC BLD AUTO-RTO: 0 /100 WBC
PLATELET # BLD AUTO: 396 K/UL (ref 164–446)
PMV BLD AUTO: 9.5 FL (ref 9–12.9)
POTASSIUM SERPL-SCNC: 3.4 MMOL/L (ref 3.6–5.5)
RBC # BLD AUTO: 2.97 M/UL (ref 4.7–6.1)
SODIUM SERPL-SCNC: 139 MMOL/L (ref 135–145)
WBC # BLD AUTO: 7.8 K/UL (ref 4.8–10.8)

## 2017-07-09 PROCEDURE — A9270 NON-COVERED ITEM OR SERVICE: HCPCS

## 2017-07-09 PROCEDURE — 700111 HCHG RX REV CODE 636 W/ 250 OVERRIDE (IP): Performed by: INTERNAL MEDICINE

## 2017-07-09 PROCEDURE — 80048 BASIC METABOLIC PNL TOTAL CA: CPT

## 2017-07-09 PROCEDURE — A9270 NON-COVERED ITEM OR SERVICE: HCPCS | Performed by: HOSPITALIST

## 2017-07-09 PROCEDURE — 700102 HCHG RX REV CODE 250 W/ 637 OVERRIDE(OP)

## 2017-07-09 PROCEDURE — 85025 COMPLETE CBC W/AUTO DIFF WBC: CPT

## 2017-07-09 PROCEDURE — A9270 NON-COVERED ITEM OR SERVICE: HCPCS | Performed by: INTERNAL MEDICINE

## 2017-07-09 PROCEDURE — 770006 HCHG ROOM/CARE - MED/SURG/GYN SEMI*

## 2017-07-09 PROCEDURE — 700102 HCHG RX REV CODE 250 W/ 637 OVERRIDE(OP): Performed by: INTERNAL MEDICINE

## 2017-07-09 PROCEDURE — 700102 HCHG RX REV CODE 250 W/ 637 OVERRIDE(OP): Performed by: HOSPITALIST

## 2017-07-09 PROCEDURE — 700105 HCHG RX REV CODE 258: Performed by: INTERNAL MEDICINE

## 2017-07-09 PROCEDURE — 36415 COLL VENOUS BLD VENIPUNCTURE: CPT

## 2017-07-09 PROCEDURE — 82962 GLUCOSE BLOOD TEST: CPT | Mod: 91

## 2017-07-09 PROCEDURE — 99233 SBSQ HOSP IP/OBS HIGH 50: CPT | Performed by: INTERNAL MEDICINE

## 2017-07-09 RX ORDER — POTASSIUM CHLORIDE 1.5 G/1.58G
40 POWDER, FOR SOLUTION ORAL ONCE
Status: COMPLETED | OUTPATIENT
Start: 2017-07-09 | End: 2017-07-09

## 2017-07-09 RX ORDER — HALOPERIDOL 5 MG/ML
2-5 INJECTION INTRAMUSCULAR EVERY 6 HOURS PRN
Status: DISCONTINUED | OUTPATIENT
Start: 2017-07-09 | End: 2017-07-12 | Stop reason: HOSPADM

## 2017-07-09 RX ADMIN — GLIPIZIDE 2.5 MG: 2.5 TABLET, EXTENDED RELEASE ORAL at 07:32

## 2017-07-09 RX ADMIN — INSULIN LISPRO 1 UNITS: 100 INJECTION, SOLUTION INTRAVENOUS; SUBCUTANEOUS at 01:19

## 2017-07-09 RX ADMIN — INSULIN LISPRO 1 UNITS: 100 INJECTION, SOLUTION INTRAVENOUS; SUBCUTANEOUS at 12:41

## 2017-07-09 RX ADMIN — SODIUM CHLORIDE: 4.5 INJECTION, SOLUTION INTRAVENOUS at 01:20

## 2017-07-09 RX ADMIN — HALOPERIDOL LACTATE 5 MG: 5 INJECTION, SOLUTION INTRAMUSCULAR at 18:30

## 2017-07-09 RX ADMIN — SIMVASTATIN 40 MG: 40 TABLET, FILM COATED ORAL at 21:35

## 2017-07-09 RX ADMIN — ASPIRIN 81 MG: 81 TABLET, CHEWABLE ORAL at 12:48

## 2017-07-09 RX ADMIN — OXYCODONE HYDROCHLORIDE 10 MG: 10 TABLET ORAL at 12:49

## 2017-07-09 RX ADMIN — Medication 100 MG: at 07:32

## 2017-07-09 RX ADMIN — LORAZEPAM 1 MG: 2 INJECTION INTRAMUSCULAR; INTRAVENOUS at 04:03

## 2017-07-09 RX ADMIN — ASPIRIN 81 MG: 81 TABLET, CHEWABLE ORAL at 03:00

## 2017-07-09 RX ADMIN — OMEPRAZOLE 20 MG: 20 CAPSULE, DELAYED RELEASE ORAL at 07:32

## 2017-07-09 RX ADMIN — POTASSIUM CHLORIDE 40 MEQ: 1.5 POWDER, FOR SOLUTION ORAL at 09:46

## 2017-07-09 RX ADMIN — FOLIC ACID 1 MG: 1 TABLET ORAL at 07:32

## 2017-07-09 RX ADMIN — ENOXAPARIN SODIUM 40 MG: 100 INJECTION SUBCUTANEOUS at 07:32

## 2017-07-09 RX ADMIN — INSULIN LISPRO 1 UNITS: 100 INJECTION, SOLUTION INTRAVENOUS; SUBCUTANEOUS at 17:38

## 2017-07-09 RX ADMIN — THERA TABS 1 TABLET: TAB at 07:32

## 2017-07-09 RX ADMIN — FAMOTIDINE 20 MG: 20 TABLET, FILM COATED ORAL at 07:32

## 2017-07-09 RX ADMIN — INSULIN LISPRO 1 UNITS: 100 INJECTION, SOLUTION INTRAVENOUS; SUBCUTANEOUS at 05:58

## 2017-07-09 RX ADMIN — QUETIAPINE FUMARATE 50 MG: 25 TABLET ORAL at 21:35

## 2017-07-09 RX ADMIN — TIOTROPIUM BROMIDE 1 CAPSULE: 18 CAPSULE ORAL; RESPIRATORY (INHALATION) at 09:00

## 2017-07-09 ASSESSMENT — PAIN SCALES - GENERAL
PAINLEVEL_OUTOF10: 0
PAINLEVEL_OUTOF10: 0

## 2017-07-09 NOTE — PROGRESS NOTES
"Pt oriented to self; reorientation provided frequently. q2 hour turns continued; condom cath in place, patent and draining. Family @ bedside and very upset that FEES is not taking place today- education provided; family noted to be giving pt swabs- pt sucking water out of swabs. Attempted to educate family again but family refusing stating \" I know what I am doing.\" Pt with wet cough . Family requesting to speak with CHIP and MD- MD and John SAUNDERS made aware.   --family upset with care provided in hospital, stating that pt has sat in poop while in the hospital- q2 hour turns explained to family and times listed on board when we would be in. Hourly rounding in place as well. Family made aware of how to call for RN assistance.   ---pt repositioned at 10-- family called RN shortly after for pt to be repositioned. RN went into room and pts legs starting to drift towards edge of bed-- family documenting with pictures. Pt confused and reoriented- bed alarm remains on and safety maintained- pt repositioned and denies pain.     -family member concerned that we are \"pumping the patient full of meds\". Pt complaining of back pain- wife OK with RN giving oxy.   "

## 2017-07-09 NOTE — DISCHARGE PLANNING
Medical Social Work  Talked to patient's wife Dara at her request.    Stated she wants to take her  to a [place] facility close to her home, [employer].  The distance between her home and [facility] is too much for her to keep coming and going.  Further, she is very dissatisfied with the care her  is receiving.  Feels if patient is placed closer to her, she can monitor his care more closely.  Further concerned about the lack of therapy he is receiving.  Telling me her sister had a complete hip replacement and was walk that day.  I explained to her skilled facilities in [place] don't take core trac, but I would verify for her. She stated she would contact the ombudsman in her community for help also.  Patient then stated she was only given one form to sign since patient's admit, a medicare form so you can get paid.  Wanted to know about documentation on patient's rights and why she wasn't given this information.

## 2017-07-09 NOTE — PROGRESS NOTES
"Pt oriented to self; reorientation provided frequently. q2 hour turns continued; condom cath in place, patent and draining. Family @ bedside and very upset that FEES is not taking place today due to patients increased confusion- family educated ; family noted to be giving pt swabs- pt sucking water out of swabs. Attempted to educate family again on risks of pt swallowing liquids but family refusing stating \" I know what I am doing.\" Pt with wet cough . Family requesting to speak with CHIP and MD- MD and John SAUNDERS made aware.   --family upset with care provided in hospital, stating that pt has sat in poop while in the hospital- q2 hour turns explained to family and times listed on board when we would be in. Hourly rounding in place as well. Family made aware of how to call for RN assistance.   ---pt repositioned at 10-- family called RN shortly after for pt to be repositioned. RN went into room and pts legs starting to drift towards edge of bed-- family documenting with pictures. Pt confused and reoriented- bed alarm remains on and safety maintained- pt repositioned and denies pain.     -family member concerned that we are \"pumping the patient full of meds\". Pt complaining of back pain- wife OK with RN giving oxy.   --pt more alert and communicative as shift progressed.     --pt restless, trying to get oob- family requesting something other than ativan to be given- haldol given per MD order   "

## 2017-07-09 NOTE — CARE PLAN
Problem: Safety  Goal: Will remain free from falls  Outcome: PROGRESSING AS EXPECTED  Pt. With HX of fall. Bed alarm ON, bed kept low and locked, call light within reach.

## 2017-07-09 NOTE — ASSESSMENT & PLAN NOTE
Improved as seen on fees  DC cor track  Otherwise patient has had chronic dysphagia and has been evaluated by ENT  Continue speech therapy  Follow-up with ENT as an outpatient

## 2017-07-09 NOTE — CARE PLAN
Problem: Safety  Goal: Will remain free from injury  Intervention: Provide assistance with mobility  Bed alarm on; bed in low position. q2 hour turns continued

## 2017-07-09 NOTE — THERAPY
Note only: Pt medicated w/ ativan 2/2 to agitation, not following, wet/gurgly requiring frequent suction. Discussed with RN. Not at appropriate level for FEES. STRICT NPO/Cortrak, patient is not at a safe level for ANY PO INTAKE. Pt at high risk for continued aspiration, as wife giving patient thin liquids at bedside despite continued education from SLP staff and RN staff.

## 2017-07-09 NOTE — PROGRESS NOTES
"During rounding with physician pt's spouse expressed frustration that PT is not working with patient more often, discussed with spouse that all therapies have made attempts to work with patient however his capacity for participation is limited at this time due to encephalopathic state and that while in acute care typically therapies are able to see a patient 3 times per week, did further education on additional activities that nursing will work with patient on such as sitting upright, at edge of bed and in bedside chair as pt is able to participate and tolerate, discussed with charge CARRIE Jerome and nursing will mobilize as pt is able    Spouse also expressed frustration that pt remains cortrak dependent stating \"we know he has trouble swallowing, he's been seen by a bunch of specialists and he does the best he can\", education provided that at this time pt is unsafe for thin liquids and that giving pt sponges to suck the water out of can worsen the gurgling quality of his respirations and cause/worsen pneumonia, to this the spouse replied \"yeah, people her keep telling me that\", it has been observed that family is providing pt with thin liquids despite contraindication and education provided on risk  "

## 2017-07-09 NOTE — PROGRESS NOTES
Assumed pt care, received report from CARRIE Mariscal  PT resting in bed, alert, oriented to self and place, restless, wife at bedside performing ROM exercise with BUE. Pt unable to follow commands, soft restraints in place, cortrak intact, pt tolerating at goal rate, no distension noted, Q4 flush, IV intact infusing .5 NS @ 125. No signs resp distress, on 2 Li NC, wet cough, gurgly garbled voice quality, unable to clear sputum, suction at bedside. Fall precautions in place: treaded slipper socks on, mobility signs posted, hourly rounding, bed in lowest position, belongings and call light are within reach, bed alarm on, SCD's intact.    Ativan given for c/o anxiety and wife request, wife concerns addressed

## 2017-07-09 NOTE — PROGRESS NOTES
[facility] Hospitalist Progress Note    Date of Service: 7/9/2017    Chief Complaint  77 y.o. male admitted 6/23/2017 transferred from [facility] facility with alcohol intoxication, [facility] fall[facility]ubated here and required re-intubation and re-extubated on 7/3.    Interval Problem Update    7/6 Transferred from ICU. Pt has hoarseness and some expressive aphasia. Alert but not oriented to time place or person. Was placed in restraints for pulling on IV lines yesterday. Will have SLP evaluate. I will evaluate his encephalopathy with a CT head, ammonia and electrolytes. His Na is elevated at 149. Will start on free water flushes. PT/OT/ST    7/7 Remains confused and agitated. Somnolent during the day, will decrease Seroquel to 50 mg QHS. CT head negative for acute process, ammonia 41. Will check EEG to further evaluate his encephalopathy. Na remains elevated. Will start on .45 NS.     7/8 More awake, per wife he is oriented to person and place. Pulled cortrak out yesterday. Wife is complaining about poor oral hygiene, I have reassured her that we will do a better job with that. EEG does not reveal any occult seizures. Awaiting ST evaluation to see if patient can swallow safely. Ambulate with nursing staff and PT/OT. Na down to 148, patient appears dry will increase free water flushes and .45 NS.     7/9 Patient failed swallow eval and is at high risk for aspiration. Wife at bedside continues to feed patient thin liquids stating that she knows he can swallow. At this time we will continue NPO and cortrak with continued speech therapy. Patient was given ativan overnight for agitation. Appears more confused today. I had a detailed discussion with wife that patient is not safe to swallow any food or drink at this time due to high risk of aspiratinon. Per wife he has always had difficutly swallowing and has been evaluated by several ENT physicians who notes there is nothing they can do to  improve his swallowing ability however he was being fed per mouth anyways. Given his recent intubation I suspect he has acute worsening of his dysphagia. We will get a fees for further evaluation. She is wife is not satisfied with treatment the patient is getting here and wants to take the patient to a skilled nursing facility on the wife that our goal is to get into a skilled nursing facility where they can focus on his rehabilitation, however the facility that she wants him to be transferred to does not accept patients with a cor-track. Patient will also need to be out of restraints before he can be discharged to a skilled nursing facility. Sodium is improved to 139, I will discontinue his half normal saline. Discontinue Ativan and we will provide Haldol when necessary for agitation.    Time spent: 32 minutes. > 50 % time spend providing counseling / co ordination of care.      Consultants/Specialty  Pulmonary     Disposition  PT/OT/ST and SNF placement      Review of Systems   Unable to perform ROS     Physical Exam  Laboratory/Imaging   Hemodynamics  Temp (24hrs), Av.7 °C (98.1 °F), Min:36.4 °C (97.5 °F), Max:37.1 °C (98.8 °F)   Temperature: 36.4 °C (97.5 °F)  Pulse  Av.6  Min: 54  Max: 145    Blood Pressure : 122/67 mmHg      Respiratory      Respiration: 17, Pulse Oximetry: 98 %     Work Of Breathing / Effort: Shallow  RUL Breath Sounds: Diminished;Clear, RML Breath Sounds: Diminished;Clear, RLL Breath Sounds: Diminished, LION Breath Sounds: Diminished, LLL Breath Sounds: Absent    Fluids    Intake/Output Summary (Last 24 hours) at 17 0833  Last data filed at 17 0600   Gross per 24 hour   Intake   4340 ml   Output   1200 ml   Net   3140 ml       Nutrition  Orders Placed This Encounter   Procedures   • Diet NPO     Standing Status: Standing      Number of Occurrences: 1      Standing Expiration Date:      Order Specific Question:  Type:     Answer:  Now [1]     Order Specific  Question:  Restrict to:     Answer:  Strict [1]     Physical Exam   Constitutional: No distress.   HENT:   Head: Normocephalic.   Oropharyngeal mucosa dry   Eyes: Conjunctivae are normal. Right eye exhibits no discharge. Left eye exhibits no discharge.   Neck: Neck supple.   Cardiovascular: Normal rate and regular rhythm.    No murmur heard.  Pulmonary/Chest: No respiratory distress. He has rales.   Abdominal: He exhibits distension. There is no tenderness. There is no rebound.   Musculoskeletal: He exhibits no edema.   Thenar atrophy.   Neurological: He is alert.   Not oriented to time place or person   Skin: Skin is warm and dry.   Psychiatric:   Confused   Nursing note and vitals reviewed.      Recent Labs      07/07/17   0432  07/08/17   0504  07/09/17   0450   WBC  12.8*  10.6  7.8   RBC  3.66*  3.20*  2.97*   HEMOGLOBIN  11.7*  10.3*  9.4*   HEMATOCRIT  36.3*  32.5*  29.4*   MCV  99.2*  101.6*  99.0*   MCH  32.0  32.2  31.6   MCHC  32.2*  31.7*  32.0*   RDW  51.0*  52.0*  47.8   PLATELETCT  612*  488*  396   MPV  9.2  9.7  9.5     Recent Labs      07/07/17   0432  07/08/17   0504  07/09/17   0450   SODIUM  149*  148*  139   POTASSIUM  3.4*  3.5*  3.4*   CHLORIDE  112  112  107   CO2  27  26  27   GLUCOSE  244*  207*  163*   BUN  54*  48*  29*   CREATININE  0.79  0.73  0.50   CALCIUM  9.7  9.0  8.4*                      Assessment/Plan     Encephalopathy acute (present on admission)  Assessment & Plan  Improving  s/p significant alcohol detox, intubation and remains very confused and agitated. On seroquel and restraints  Decrease Seroquel to 50 mg QHS  CT head negative for acute process, reveals atrophy and microvascular ischemic changes  Ammonia WNL  EEG reveals Mild to moderate diffuse cerebral dysfunction, suggestive of an encephalopathic state  BUN elevated, likely secondary to dehydration, increase free water and 0.45 NS  Avoid Ativan  Haldol PRN for agitation      Hypernatremia  Assessment &  Plan  Resolved  Discontinue 0.45 NS    Continue free water flushes to 400 mg Q4 hours  continue to monitor BMP    Aspiration pneumonitis (CMS-[facility]) (present on admission)  Assessment & Plan  Extubated on 6/24 but required re-intubation and extubated again on 7/3.  BAL with pseudomonas and klebsiella  Completed 10 day course of zosyn  Continue nothing by mouth and cor tract    Respiratory failure (CMS-[facility]) (present on admission)  Assessment & Plan  s/p intubation x2  Pulmonary consulted and following  Currently on 2L of oxygen  Continue RT protocol        Alcohol abuse (present on admission)  Assessment & Plan  Out of withdrawal window  Continue Folate, thiamine    Anemia (present on admission)  Assessment & Plan  Macrocytic  Folate, B12 and TSH WNL  No sign of acute bleed.  Hemoglobin has been in the 8-10 range  Continue PPi    Hypokalemia (present on admission)  Assessment & Plan  Supplement with diuresis    Type 2 diabetes mellitus (CMS-[facility]) (present on admission)  Assessment & Plan  Sliding scale insulin    Dysphagia (present on admission)  Assessment & Plan  Otherwise patient has had chronic dysphagia and has been evaluated by ENT  [place] has worsening of his chronic dysphagia secondary to recent intubation  His therapy recommends nothing by mouth at this time  We'll get a fees for further evaluation  Continue cor-track  Continue speech therapy      Labs reviewed, Medications reviewed and Radiology images reviewed        DVT Prophylaxis: Enoxaparin (Lovenox)

## 2017-07-10 ENCOUNTER — APPOINTMENT (OUTPATIENT)
Dept: RADIOLOGY | Facility: MEDICAL CENTER | Age: 77
DRG: 163 | End: 2017-07-10
Attending: INTERNAL MEDICINE
Payer: MEDICARE

## 2017-07-10 LAB
ALBUMIN SERPL BCP-MCNC: 3.1 G/DL (ref 3.2–4.9)
ALBUMIN/GLOB SERPL: 1.1 G/DL
ALP SERPL-CCNC: 103 U/L (ref 30–99)
ALT SERPL-CCNC: 18 U/L (ref 2–50)
ANION GAP SERPL CALC-SCNC: 2 MMOL/L (ref 0–11.9)
AST SERPL-CCNC: 12 U/L (ref 12–45)
BASOPHILS # BLD AUTO: 0.4 % (ref 0–1.8)
BASOPHILS # BLD: 0.03 K/UL (ref 0–0.12)
BILIRUB SERPL-MCNC: 0.3 MG/DL (ref 0.1–1.5)
BUN SERPL-MCNC: 23 MG/DL (ref 8–22)
CALCIUM SERPL-MCNC: 8.7 MG/DL (ref 8.5–10.5)
CHLORIDE SERPL-SCNC: 108 MMOL/L (ref 96–112)
CO2 SERPL-SCNC: 27 MMOL/L (ref 20–33)
CREAT SERPL-MCNC: 0.53 MG/DL (ref 0.5–1.4)
CRP SERPL HS-MCNC: 1.36 MG/DL (ref 0–0.75)
EOSINOPHIL # BLD AUTO: 0.17 K/UL (ref 0–0.51)
EOSINOPHIL NFR BLD: 2.4 % (ref 0–6.9)
ERYTHROCYTE [DISTWIDTH] IN BLOOD BY AUTOMATED COUNT: 46 FL (ref 35.9–50)
GFR SERPL CREATININE-BSD FRML MDRD: >60 ML/MIN/1.73 M 2
GLOBULIN SER CALC-MCNC: 2.7 G/DL (ref 1.9–3.5)
GLUCOSE BLD-MCNC: 119 MG/DL (ref 65–99)
GLUCOSE BLD-MCNC: 122 MG/DL (ref 65–99)
GLUCOSE BLD-MCNC: 136 MG/DL (ref 65–99)
GLUCOSE BLD-MCNC: 92 MG/DL (ref 65–99)
GLUCOSE SERPL-MCNC: 145 MG/DL (ref 65–99)
HCT VFR BLD AUTO: 29.4 % (ref 42–52)
HGB BLD-MCNC: 9.7 G/DL (ref 14–18)
IMM GRANULOCYTES # BLD AUTO: 0.02 K/UL (ref 0–0.11)
IMM GRANULOCYTES NFR BLD AUTO: 0.3 % (ref 0–0.9)
LYMPHOCYTES # BLD AUTO: 0.92 K/UL (ref 1–4.8)
LYMPHOCYTES NFR BLD: 13.1 % (ref 22–41)
MCH RBC QN AUTO: 32.4 PG (ref 27–33)
MCHC RBC AUTO-ENTMCNC: 33 G/DL (ref 33.7–35.3)
MCV RBC AUTO: 98.3 FL (ref 81.4–97.8)
MONOCYTES # BLD AUTO: 0.45 K/UL (ref 0–0.85)
MONOCYTES NFR BLD AUTO: 6.4 % (ref 0–13.4)
NEUTROPHILS # BLD AUTO: 5.44 K/UL (ref 1.82–7.42)
NEUTROPHILS NFR BLD: 77.4 % (ref 44–72)
NRBC # BLD AUTO: 0 K/UL
NRBC BLD AUTO-RTO: 0 /100 WBC
PLATELET # BLD AUTO: 370 K/UL (ref 164–446)
PMV BLD AUTO: 9.5 FL (ref 9–12.9)
POTASSIUM SERPL-SCNC: 3.8 MMOL/L (ref 3.6–5.5)
PREALB SERPL-MCNC: 22 MG/DL (ref 18–38)
PROT SERPL-MCNC: 5.8 G/DL (ref 6–8.2)
RBC # BLD AUTO: 2.99 M/UL (ref 4.7–6.1)
SODIUM SERPL-SCNC: 137 MMOL/L (ref 135–145)
WBC # BLD AUTO: 7 K/UL (ref 4.8–10.8)

## 2017-07-10 PROCEDURE — 80053 COMPREHEN METABOLIC PANEL: CPT

## 2017-07-10 PROCEDURE — 700102 HCHG RX REV CODE 250 W/ 637 OVERRIDE(OP): Performed by: HOSPITALIST

## 2017-07-10 PROCEDURE — 92526 ORAL FUNCTION THERAPY: CPT

## 2017-07-10 PROCEDURE — 700102 HCHG RX REV CODE 250 W/ 637 OVERRIDE(OP): Performed by: INTERNAL MEDICINE

## 2017-07-10 PROCEDURE — 770006 HCHG ROOM/CARE - MED/SURG/GYN SEMI*

## 2017-07-10 PROCEDURE — 86140 C-REACTIVE PROTEIN: CPT

## 2017-07-10 PROCEDURE — 82962 GLUCOSE BLOOD TEST: CPT | Mod: 91

## 2017-07-10 PROCEDURE — 99232 SBSQ HOSP IP/OBS MODERATE 35: CPT | Performed by: INTERNAL MEDICINE

## 2017-07-10 PROCEDURE — G8996 SWALLOW CURRENT STATUS: HCPCS | Mod: CK

## 2017-07-10 PROCEDURE — 84134 ASSAY OF PREALBUMIN: CPT

## 2017-07-10 PROCEDURE — 85025 COMPLETE CBC W/AUTO DIFF WBC: CPT

## 2017-07-10 PROCEDURE — A9270 NON-COVERED ITEM OR SERVICE: HCPCS | Performed by: INTERNAL MEDICINE

## 2017-07-10 PROCEDURE — 700102 HCHG RX REV CODE 250 W/ 637 OVERRIDE(OP)

## 2017-07-10 PROCEDURE — 36415 COLL VENOUS BLD VENIPUNCTURE: CPT

## 2017-07-10 PROCEDURE — G8997 SWALLOW GOAL STATUS: HCPCS | Mod: CH

## 2017-07-10 PROCEDURE — A9270 NON-COVERED ITEM OR SERVICE: HCPCS | Performed by: HOSPITALIST

## 2017-07-10 PROCEDURE — 92612 ENDOSCOPY SWALLOW (FEES) VID: CPT

## 2017-07-10 PROCEDURE — A9270 NON-COVERED ITEM OR SERVICE: HCPCS

## 2017-07-10 PROCEDURE — 700111 HCHG RX REV CODE 636 W/ 250 OVERRIDE (IP): Performed by: INTERNAL MEDICINE

## 2017-07-10 RX ADMIN — GLIPIZIDE 2.5 MG: 2.5 TABLET, EXTENDED RELEASE ORAL at 09:48

## 2017-07-10 RX ADMIN — ASPIRIN 81 MG: 81 TABLET, CHEWABLE ORAL at 03:36

## 2017-07-10 RX ADMIN — Medication 100 MG: at 09:49

## 2017-07-10 RX ADMIN — OXYCODONE HYDROCHLORIDE 10 MG: 10 TABLET ORAL at 14:22

## 2017-07-10 RX ADMIN — FOLIC ACID 1 MG: 1 TABLET ORAL at 09:49

## 2017-07-10 RX ADMIN — THERA TABS 1 TABLET: TAB at 09:49

## 2017-07-10 RX ADMIN — QUETIAPINE FUMARATE 50 MG: 25 TABLET ORAL at 21:43

## 2017-07-10 RX ADMIN — SENNOSIDES AND DOCUSATE SODIUM 2 TABLET: 8.6; 5 TABLET ORAL at 09:49

## 2017-07-10 RX ADMIN — TIOTROPIUM BROMIDE 1 CAPSULE: 18 CAPSULE ORAL; RESPIRATORY (INHALATION) at 09:49

## 2017-07-10 RX ADMIN — OXYCODONE HYDROCHLORIDE 10 MG: 10 TABLET ORAL at 03:46

## 2017-07-10 RX ADMIN — OMEPRAZOLE 20 MG: 20 CAPSULE, DELAYED RELEASE ORAL at 09:48

## 2017-07-10 RX ADMIN — SIMVASTATIN 40 MG: 40 TABLET, FILM COATED ORAL at 21:43

## 2017-07-10 RX ADMIN — OXYCODONE HYDROCHLORIDE 10 MG: 10 TABLET ORAL at 21:43

## 2017-07-10 RX ADMIN — ASPIRIN 81 MG: 81 TABLET, CHEWABLE ORAL at 14:23

## 2017-07-10 RX ADMIN — ENOXAPARIN SODIUM 40 MG: 100 INJECTION SUBCUTANEOUS at 09:48

## 2017-07-10 ASSESSMENT — PAIN SCALES - GENERAL
PAINLEVEL_OUTOF10: 0
PAINLEVEL_OUTOF10: 6
PAINLEVEL_OUTOF10: 7
PAINLEVEL_OUTOF10: 2
PAINLEVEL_OUTOF10: 0

## 2017-07-10 NOTE — PROGRESS NOTES
"Received report and assumed care at 1900: Patients family was at bedside at the change of report and stated they'd be in [place] until after the Swallow eval was done tomorrow. They stated there interest in having the coretrack removed stating, \"its just one more thing that needs to be paid for\". Patient is currently awake and oriented to self and time. His speech isn't very clear and he has a gurgly cough. Meds crushed through the tube.  Wrist restraints on and q2h turning. Hourly rounding in place.  "

## 2017-07-10 NOTE — PROGRESS NOTES
Restraints removed at 8 am per MD order.  Coretrak removed and pt using urinal instead of condom cath.

## 2017-07-10 NOTE — DISCHARGE PLANNING
Spoke with Leona at [facility] they have no reviewed referral as yet.  She will review and advised on acceptance.  Per Leona they currently have no staff for admission today.  PRATIBHA Lopez advised.

## 2017-07-10 NOTE — DISCHARGE PLANNING
Medical Social Work  Patient had a Fees assessment today, they can take out the core trac.    Faxed Choice to [facility] for skilled in [facility].

## 2017-07-10 NOTE — THERAPY
"Speech Language Therapy dysphagia treatment completed.   Functional Status:  Patient seen for dysphagia therapy on this date.  Order acknowledged for a FEES , however, per previous SLP notes, the patient has not been appropriate due to increased lethargy.  Upon entering the room, the patient was sleeping but easily aroused with verbal and tactile cues.  Presentation of PO included nectars and purees.  The patient presented with delayed initiation of swallow trigger and weak laryngeal elevation upon palpation.  The patient had delayed initiation of cough following ~25% of nectars.  Patient's wife at bedside with multiple questions and concerns regarding PO intake and cortrak.  Patient's wife stated, \"I have been giving him liquids and he has been fine.\"  Provided extensive education to s/sx concerning for aspiration and FEES procedure.  All agreed to proceed with FEES.    Recommendations: Continue NPO with Cortrak.  FEES this AM    Plan of Care: Will benefit from Speech Therapy 3 times per week  Post-Acute Therapy: Discharge to a transitional care facility for continued skilled therapy services.    See \"Rehab Therapy-Acute\" Patient Summary Report for complete documentation.     "

## 2017-07-10 NOTE — DISCHARGE PLANNING
Received choice form from [facility] at 0930.  Referral sent to [facility] at 0932 on 07-10-17. Per [facility] Core track is out, [facility] has been advised.

## 2017-07-10 NOTE — THERAPY
Speech Language Therapy FEES completed.  Functional Status: FEES procedure explained, the patient agreed to proceed and tolerated well.  Upon insertion of the scope, vocal cords appeared symmetrical and complete vocal cord adduction was achieved during phonation and cough.  He did not follow directives to breath hold.  Presentation of PO included ice chips, nectars, purees, pudding, soft solids, and thin liquids.  The patient presented with mild pharyngeal dysphagia as seen by reduced sensation, weak tongue base retraction, weak pharyngeal squeeze, and reduced laryngeal elevation.  Subsequent premature spillage was observed to the level of the valleculae and pyriform sinuses and mild diffuse pharyngeal residue with all consistencies.  Rapid waterfall premature spillage was noted with thins.  Penetration to the back side of the epiglottis was observed during the swallow with straw sips of nectars.  The patient had penetration after the swallow to the glottic side of the epiglottis with soft solids that required multiple (2-3) liquid washes to clear.  NO aspiration was observed with any consistency consumed, however, given rapid spillage of thins he is at a high risk of aspirating thins.  The patient did not consistently follow cues to swallowing strategies and had no cough response to penetration.  He did have a consistent cough during the session with coughed mucous observed.  At this time, recommend [place] Thick Full Liquids diet with 1:1 feeding assistance and supervision during meals.  OK to pull Cortrak from SLP standpoint.    Recommendations - Diet: Diet / Liquid Recommendation: Nectar thick full liquids, nectar thick                          Strategies: Strict 1:1 feeding , Direct supervision during meals, No Straws and Head of Bed at 90 Degrees                          Medication Administration: Medication Administration : Via Gastric Tube  Plan of Care: Will benefit from Speech Therapy 3 times per  "week  Post-Acute Therapy: Discharge to a transitional care facility for continued skilled therapy services.    See \"Rehab Therapy-Acute\" Patient Summary Report for complete documentation.   "

## 2017-07-10 NOTE — CARE PLAN
Problem: Knowledge Deficit  Goal: Knowledge of disease process/condition, treatment plan, diagnostic tests, and medications will improve  Intervention: Explain information regarding disease process/condition, treatment plan, diagnostic tests, and medications and document in education  Educate patient on medications and why medications are being crushed. Educate on coretrak      Problem: Respiratory:  Goal: Respiratory status will improve  Intervention: Assess and monitor pulmonary status  Assess breath sounds and suction as needed. Make note of changes to patients respiratory status.

## 2017-07-10 NOTE — PROGRESS NOTES
Patient managed to loosen up his wrist restraints and pull out his Coretrak. Bridal was placed. Waiting for Xray for confirmation of placement. Currently at 95 at the nose

## 2017-07-10 NOTE — PROGRESS NOTES
[facility] Hospitalist Progress Note    Date of Service: 7/10/2017    Chief Complaint  77 y.o. male admitted 6/23/2017 transferred from [facility] facility with alcohol intoxication, [facility] level [facility]ated here and required re-intubation and re-extubated on 7/3.    Interval Problem Update    7/6 Transferred from ICU. Pt has hoarseness and some expressive aphasia. Alert but not oriented to time place or person. Was placed in restraints for pulling on IV lines yesterday. Will have SLP evaluate. I will evaluate his encephalopathy with a CT head, ammonia and electrolytes. His Na is elevated at 149. Will start on free water flushes. PT/OT/ST    7/7 Remains confused and agitated. Somnolent during the day, will decrease Seroquel to 50 mg QHS. CT head negative for acute process, ammonia 41. Will check EEG to further evaluate his encephalopathy. Na remains elevated. Will start on .45 NS.     7/8 More awake, per wife he is oriented to person and place. Pulled cortrak out yesterday. Wife is complaining about poor oral hygiene, I have reassured her that we will do a better job with that. EEG does not reveal any occult seizures. Awaiting ST evaluation to see if patient can swallow safely. Ambulate with nursing staff and PT/OT. Na down to 148, patient appears dry will increase free water flushes and .45 NS.     7/9 Patient failed swallow eval and is at high risk for aspiration. Wife at bedside continues to feed patient thin liquids stating that she knows he can swallow. At this time we will continue NPO and cortrak with continued speech therapy. Patient was given ativan overnight for agitation. Appears more confused today. I had a detailed discussion with wife that patient is not safe to swallow any food or drink at this time due to high risk of aspiratinon. Per wife he has always had difficutly swallowing and has been evaluated by several ENT physicians who notes there is nothing they can do to  improve his swallowing ability however he was being fed per mouth anyways. Given his recent intubation I suspect he has acute worsening of his dysphagia. We will get a fees for further evaluation. She is wife is not satisfied with treatment the patient is getting here and wants to take the patient to a skilled nursing facility on the wife that our goal is to get into a skilled nursing facility where they can focus on his rehabilitation, however the facility that she wants him to be transferred to does not accept patients with a cor-track. Patient will also need to be out of restraints before he can be discharged to a skilled nursing facility. Sodium is improved to 139, I will discontinue his half normal saline. Discontinue Ativan and we will provide Haldol when necessary for agitation.    7/10 Patient did well on FEES exam, no risk of aspiration at this time. Will discontinue cor-trak and restraints. Patient is answering questions appropriately, still disoriented to time, place and person. Family at bedside and is frequently reorienting patient. Encouraged to participate with PT and OT. Pending discharged to [facility] nursing [facility].      Consultants/Specialty  Pulmonary     Disposition  PT/OT/ST and SNF placement      Review of Systems   Unable to perform ROS     Physical Exam  Laboratory/Imaging   Hemodynamics  Temp (24hrs), Av.6 °C (97.8 °F), Min:36.4 °C (97.6 °F), Max:36.8 °C (98.2 °F)   Temperature: 36.5 °C (97.7 °F)  Pulse  Av.5  Min: 54  Max: 145    Blood Pressure : 129/62 mmHg      Respiratory      Respiration: 16, Pulse Oximetry: 98 %     Work Of Breathing / Effort: Shallow  RUL Breath Sounds: Fine Crackles, RML Breath Sounds: Fine Crackles, RLL Breath Sounds: Diminished, LION Breath Sounds: Fine Crackles, LLL Breath Sounds: Diminished    Fluids    Intake/Output Summary (Last 24 hours) at 07/10/17 0901  Last data filed at 07/10/17 0600   Gross per 24 hour   Intake    800 ml   Output    2300 ml   Net  -1500 ml       Nutrition  Orders Placed This Encounter   Procedures   • Diet NPO     Standing Status: Standing      Number of Occurrences: 1      Standing Expiration Date:      Order Specific Question:  Type:     Answer:  Now [1]     Order Specific Question:  Restrict to:     Answer:  Strict [1]     Physical Exam   Constitutional: No distress.   HENT:   Head: Normocephalic.   Oropharyngeal mucosa dry   Eyes: Conjunctivae are normal. Right eye exhibits no discharge. Left eye exhibits no discharge.   Neck: Neck supple.   Cardiovascular: Normal rate and regular rhythm.    No murmur heard.  Pulmonary/Chest: No respiratory distress. He has rales.   Abdominal: He exhibits distension. There is no tenderness. There is no rebound.   Musculoskeletal: He exhibits no edema.   Thenar atrophy.   Neurological: He is alert.   Not oriented to time place or person   Skin: Skin is warm and dry.   Psychiatric:   Confused   Nursing note and vitals reviewed.      Recent Labs      07/08/17   0504  07/09/17   0450  07/10/17   0417   WBC  10.6  7.8  7.0   RBC  3.20*  2.97*  2.99*   HEMOGLOBIN  10.3*  9.4*  9.7*   HEMATOCRIT  32.5*  29.4*  29.4*   MCV  101.6*  99.0*  98.3*   MCH  32.2  31.6  32.4   MCHC  31.7*  32.0*  33.0*   RDW  52.0*  47.8  46.0   PLATELETCT  488*  396  370   MPV  9.7  9.5  9.5     Recent Labs      07/08/17   0504  07/09/17   0450  07/10/17   0417   SODIUM  148*  139  137   POTASSIUM  3.5*  3.4*  3.8   CHLORIDE  112  107  108   CO2  26  27  27   GLUCOSE  207*  163*  145*   BUN  48*  29*  23*   CREATININE  0.73  0.50  0.53   CALCIUM  9.0  8.4*  8.7                      Assessment/Plan     Encephalopathy acute (present on admission)  Assessment & Plan  Improving  s/p significant alcohol detox, intubation and remains very confused and agitated. On seroquel and restraints  Decrease Seroquel to 50 mg QHS  CT head negative for acute process, reveals atrophy and microvascular ischemic changes  Ammonia WNL  EEG  reveals Mild to moderate diffuse cerebral dysfunction, suggestive of an encephalopathic state  Avoid Ativan  Haldol PRN for agitation  Continue frequent reorientation      Hypernatremia  Assessment & Plan  Resolved      Aspiration pneumonitis (CMS-[facility]) (present on admission)  Assessment & Plan  Extubated on 6/24 but required re-intubation and extubated again on 7/3.  BAL with pseudomonas and klebsiella  Completed 10 day course of zosyn  Continue nothing by mouth and cor tract    Respiratory failure (CMS-[facility]) (present on admission)  Assessment & Plan  s/p intubation x2  Pulmonary consulted and following  Currently on 2L of oxygen  Continue RT protocol        Alcohol abuse (present on admission)  Assessment & Plan  Out of withdrawal window  Continue Folate, thiamine    Anemia (present on admission)  Assessment & Plan  Macrocytic  Folate, B12 and TSH WNL  No sign of acute bleed.  Hemoglobin has been in the 8-10 range  Continue PPi    Hypokalemia (present on admission)  Assessment & Plan  Supplement with diuresis    Type 2 diabetes mellitus (CMS-[facility]) (present on admission)  Assessment & Plan  Sliding scale insulin    Dysphagia (present on admission)  Assessment & Plan  Improved as seen on fees  DC cor track  Otherwise patient has had chronic dysphagia and has been evaluated by ENT  Continue speech therapy  Follow-up with ENT as an outpatient      Labs reviewed, Medications reviewed and Radiology images reviewed        DVT Prophylaxis: Enoxaparin (Lovenox)

## 2017-07-11 ENCOUNTER — APPOINTMENT (OUTPATIENT)
Dept: RADIOLOGY | Facility: MEDICAL CENTER | Age: 77
DRG: 163 | End: 2017-07-11
Attending: INTERNAL MEDICINE
Payer: MEDICARE

## 2017-07-11 LAB
ANION GAP SERPL CALC-SCNC: 7 MMOL/L (ref 0–11.9)
BASOPHILS # BLD AUTO: 0.5 % (ref 0–1.8)
BASOPHILS # BLD: 0.05 K/UL (ref 0–0.12)
BUN SERPL-MCNC: 18 MG/DL (ref 8–22)
CALCIUM SERPL-MCNC: 9.2 MG/DL (ref 8.5–10.5)
CHLORIDE SERPL-SCNC: 106 MMOL/L (ref 96–112)
CO2 SERPL-SCNC: 27 MMOL/L (ref 20–33)
CREAT SERPL-MCNC: 0.64 MG/DL (ref 0.5–1.4)
EOSINOPHIL # BLD AUTO: 0.15 K/UL (ref 0–0.51)
EOSINOPHIL NFR BLD: 1.4 % (ref 0–6.9)
ERYTHROCYTE [DISTWIDTH] IN BLOOD BY AUTOMATED COUNT: 47.5 FL (ref 35.9–50)
GFR SERPL CREATININE-BSD FRML MDRD: >60 ML/MIN/1.73 M 2
GLUCOSE BLD-MCNC: 112 MG/DL (ref 65–99)
GLUCOSE BLD-MCNC: 129 MG/DL (ref 65–99)
GLUCOSE SERPL-MCNC: 123 MG/DL (ref 65–99)
HCT VFR BLD AUTO: 33.6 % (ref 42–52)
HGB BLD-MCNC: 11 G/DL (ref 14–18)
IMM GRANULOCYTES # BLD AUTO: 0.05 K/UL (ref 0–0.11)
IMM GRANULOCYTES NFR BLD AUTO: 0.5 % (ref 0–0.9)
LYMPHOCYTES # BLD AUTO: 1.01 K/UL (ref 1–4.8)
LYMPHOCYTES NFR BLD: 9.2 % (ref 22–41)
MCH RBC QN AUTO: 32.3 PG (ref 27–33)
MCHC RBC AUTO-ENTMCNC: 32.7 G/DL (ref 33.7–35.3)
MCV RBC AUTO: 98.5 FL (ref 81.4–97.8)
MONOCYTES # BLD AUTO: 0.59 K/UL (ref 0–0.85)
MONOCYTES NFR BLD AUTO: 5.4 % (ref 0–13.4)
NEUTROPHILS # BLD AUTO: 9.13 K/UL (ref 1.82–7.42)
NEUTROPHILS NFR BLD: 83 % (ref 44–72)
NRBC # BLD AUTO: 0 K/UL
NRBC BLD AUTO-RTO: 0 /100 WBC
PLATELET # BLD AUTO: 387 K/UL (ref 164–446)
PMV BLD AUTO: 10.1 FL (ref 9–12.9)
POTASSIUM SERPL-SCNC: 3.9 MMOL/L (ref 3.6–5.5)
RBC # BLD AUTO: 3.41 M/UL (ref 4.7–6.1)
SODIUM SERPL-SCNC: 140 MMOL/L (ref 135–145)
WBC # BLD AUTO: 11 K/UL (ref 4.8–10.8)

## 2017-07-11 PROCEDURE — 85025 COMPLETE CBC W/AUTO DIFF WBC: CPT

## 2017-07-11 PROCEDURE — 700102 HCHG RX REV CODE 250 W/ 637 OVERRIDE(OP): Performed by: INTERNAL MEDICINE

## 2017-07-11 PROCEDURE — A9270 NON-COVERED ITEM OR SERVICE: HCPCS | Performed by: INTERNAL MEDICINE

## 2017-07-11 PROCEDURE — 97535 SELF CARE MNGMENT TRAINING: CPT

## 2017-07-11 PROCEDURE — 80048 BASIC METABOLIC PNL TOTAL CA: CPT

## 2017-07-11 PROCEDURE — 700111 HCHG RX REV CODE 636 W/ 250 OVERRIDE (IP): Performed by: INTERNAL MEDICINE

## 2017-07-11 PROCEDURE — A9270 NON-COVERED ITEM OR SERVICE: HCPCS

## 2017-07-11 PROCEDURE — 700102 HCHG RX REV CODE 250 W/ 637 OVERRIDE(OP): Performed by: HOSPITALIST

## 2017-07-11 PROCEDURE — 82962 GLUCOSE BLOOD TEST: CPT | Mod: 91

## 2017-07-11 PROCEDURE — 36415 COLL VENOUS BLD VENIPUNCTURE: CPT

## 2017-07-11 PROCEDURE — 71010 DX-CHEST-PORTABLE (1 VIEW): CPT

## 2017-07-11 PROCEDURE — 99232 SBSQ HOSP IP/OBS MODERATE 35: CPT | Performed by: INTERNAL MEDICINE

## 2017-07-11 PROCEDURE — 770006 HCHG ROOM/CARE - MED/SURG/GYN SEMI*

## 2017-07-11 PROCEDURE — A9270 NON-COVERED ITEM OR SERVICE: HCPCS | Performed by: HOSPITALIST

## 2017-07-11 PROCEDURE — 700102 HCHG RX REV CODE 250 W/ 637 OVERRIDE(OP)

## 2017-07-11 RX ADMIN — THERA TABS 1 TABLET: TAB at 08:32

## 2017-07-11 RX ADMIN — ASPIRIN 81 MG: 81 TABLET, CHEWABLE ORAL at 14:31

## 2017-07-11 RX ADMIN — QUETIAPINE FUMARATE 50 MG: 25 TABLET ORAL at 20:32

## 2017-07-11 RX ADMIN — Medication 100 MG: at 08:33

## 2017-07-11 RX ADMIN — TIOTROPIUM BROMIDE 1 CAPSULE: 18 CAPSULE ORAL; RESPIRATORY (INHALATION) at 08:35

## 2017-07-11 RX ADMIN — ASPIRIN 81 MG: 81 TABLET, CHEWABLE ORAL at 03:49

## 2017-07-11 RX ADMIN — ENOXAPARIN SODIUM 40 MG: 100 INJECTION SUBCUTANEOUS at 08:34

## 2017-07-11 RX ADMIN — OXYCODONE HYDROCHLORIDE 10 MG: 10 TABLET ORAL at 22:00

## 2017-07-11 RX ADMIN — GLIPIZIDE 2.5 MG: 2.5 TABLET, EXTENDED RELEASE ORAL at 08:33

## 2017-07-11 RX ADMIN — FOLIC ACID 1 MG: 1 TABLET ORAL at 08:33

## 2017-07-11 RX ADMIN — SIMVASTATIN 40 MG: 40 TABLET, FILM COATED ORAL at 20:32

## 2017-07-11 RX ADMIN — OMEPRAZOLE 20 MG: 20 CAPSULE, DELAYED RELEASE ORAL at 08:33

## 2017-07-11 ASSESSMENT — COGNITIVE AND FUNCTIONAL STATUS - GENERAL
DAILY ACTIVITIY SCORE: 16
DRESSING REGULAR LOWER BODY CLOTHING: A LITTLE
EATING MEALS: A LITTLE
SUGGESTED CMS G CODE MODIFIER DAILY ACTIVITY: CK
DRESSING REGULAR UPPER BODY CLOTHING: A LITTLE
PERSONAL GROOMING: A LITTLE
HELP NEEDED FOR BATHING: A LOT
TOILETING: A LOT

## 2017-07-11 ASSESSMENT — PAIN SCALES - GENERAL
PAINLEVEL_OUTOF10: 7
PAINLEVEL_OUTOF10: 0

## 2017-07-11 NOTE — DISCHARGE PLANNING
Medical Social Work  PC from [facility], [phone].  Calling to get an update on patient's d/c, she is aware that patient was declined by [facility] and the request for H/H.  Went over patient's admit reason's and patient's wife's desire to take patient home with h/h. Elenita stated that [facility] does not go to patient's location, and he would need to go to outpatient.  Further, patient's wife works full time.  Concerns over how safe of a d/c this is.  Patient is A Ox1, not able to transfer.  Asked if I was going to call the patient's wife and talk about her d/c plan.  I told her that I feel it would be better if the Dr or the Hospitialist nurse talked to her.      Talked to Hospitalist nurseLyndsey about above conversation and that the discharge could be unsafe.  Requested either she or Dr. Lopez talk to the wife today.

## 2017-07-11 NOTE — FACE TO FACE
Face to Face Supporting Documentation - Home Health    The encounter with this patient was in whole or in part the primary reason for home health admission.    Date of encounter:   Patient:                    MRN:                       YOB: 2015  Xavier Bynum  [id number]  1940     Home health to see patient for:  Skilled Nursing care for assessment, interventions & education and Physical Therapy evaluation and treatment    Skilled need for:  Exacerbation of Chronic Disease State AMS and failure to thrive    Skilled nursing interventions to include:  Comment: PT and RN    Homebound status evidenced by:  Need the aid of supportive devices such as crutches, canes, wheelchairs or walkers. Leaving home requires a considerable and taxing effort. There is a normal inability to leave the home.    Community Physician to provide follow up care: Pcp Not In Computer     Optional Interventions? No      I certify the face to face encounter for this home health care referral meets the CMS requirements and the encounter/clinical assessment with the patient was, in whole, or in part, for the medical condition(s) listed above, which is the primary reason for home health care. Based on my clinical findings: the service(s) are medically necessary, support the need for home health care, and the homebound criteria are met.  I certify that this patient has had a face to face encounter by myself.  Leeanne Lopez M.D. - NPI: [id number]

## 2017-07-11 NOTE — CARE PLAN
Problem: Nutritional:  Goal: Achieve adequate nutritional intake  Patient will consume 50 % of meals  Outcome: PROGRESSING AS EXPECTED

## 2017-07-11 NOTE — PROGRESS NOTES
"Pt is AOx1 oriented to self only, reoriented appropriately, when asked about orientation questions, stated \"because my wife is stupid\", no complains of pain, tolerated all oral medications, no coughing during med administration but pt sounds wet when he coughs at times when not eating, skin and sacral assessment done, on RA, call light in use, bed alarm on, charge RN aware, treaded socks on, bed locked in low position, plan of care discussed, all needs met at this time. Educated on properly calling for assistance so as to prevent injuries. Pt agreed.         "

## 2017-07-11 NOTE — PROGRESS NOTES
[facility] Hospitalist Progress Note    Date of Service: 7/11/2017    Chief Complaint  77 y.o. male admitted 6/23/2017 transferred from [facility] facility with alcohol intoxication, ground level fall[facility] extubated here and required re-intubation and re-extubated on 7/3.    Interval Problem Update    7/6 Transferred from ICU. Pt has hoarseness and some expressive aphasia. Alert but not oriented to time place or person. Was placed in restraints for pulling on IV lines yesterday. Will have SLP evaluate. I will evaluate his encephalopathy with a CT head, ammonia and electrolytes. His Na is elevated at 149. Will start on free water flushes. PT/OT/ST    7/7 Remains confused and agitated. Somnolent during the day, will decrease Seroquel to 50 mg QHS. CT head negative for acute process, ammonia 41. Will check EEG to further evaluate his encephalopathy. Na remains elevated. Will start on .45 NS.     7/8 More awake, per wife he is oriented to person and place. Pulled cortrak out yesterday. Wife is complaining about poor oral hygiene, I have reassured her that we will do a better job with that. EEG does not reveal any occult seizures. Awaiting ST evaluation to see if patient can swallow safely. Ambulate with nursing staff and PT/OT. Na down to 148, patient appears dry will increase free water flushes and .45 NS.     7/9 Patient failed swallow eval and is at high risk for aspiration. Wife at bedside continues to feed patient thin liquids stating that she knows he can swallow. At this time we will continue NPO and cortrak with continued speech therapy. Patient was given ativan overnight for agitation. Appears more confused today. I had a detailed discussion with wife that patient is not safe to swallow any food or drink at this time due to high risk of aspiratinon. Per wife he has always had difficutly swallowing and has been evaluated by several ENT physicians who notes there is nothing they can do to  improve his swallowing ability however he was being fed per mouth anyways. Given his recent intubation I suspect he has acute worsening of his dysphagia. We will get a fees for further evaluation. She is wife is not satisfied with treatment the patient is getting here and wants to take the patient to a skilled nursing facility on the wife that our goal is to get into a skilled nursing facility where they can focus on his rehabilitation, however the facility that she wants him to be transferred to does not accept patients with a cor-track. Patient will also need to be out of restraints before he can be discharged to a skilled nursing facility. Sodium is improved to 139, I will discontinue his half normal saline. Discontinue Ativan and we will provide Haldol when necessary for agitation.    7/10 Patient did well on FEES exam, no risk of aspiration at this time. Will discontinue cor-trak and restraints. Patient is answering questions appropriately, still disoriented to time, place and person. Family at bedside and is frequently reorienting patient. Encouraged to participate with PT and OT. Pending discharged to [facility] nursing [facility].     patient has been stable in did not demonstrate aspiration for the past 24 hours. Cortright was removed. Patient's family wants patient to go home with home health.      Consultants/Specialty  Pulmonary     Disposition  PT/OT/ST and home with home health       Review of Systems   Unable to perform ROS     Physical Exam  Laboratory/Imaging   Hemodynamics  Temp (24hrs), Av °C (98.6 °F), Min:36.6 °C (97.8 °F), Max:37.5 °C (99.5 °F)   Temperature: 36.6 °C (97.8 °F)  Pulse  Av.5  Min: 54  Max: 145    Blood Pressure : 127/51 mmHg      Respiratory      Respiration: 18, Pulse Oximetry: 92 %     Work Of Breathing / Effort: Shallow  RUL Breath Sounds: Clear, RML Breath Sounds: Diminished, RLL Breath Sounds: Diminished, LION Breath Sounds: Clear, LLL Breath Sounds:  Diminished    Fluids    Intake/Output Summary (Last 24 hours) at 07/11/17 1400  Last data filed at 07/11/17 0600   Gross per 24 hour   Intake    120 ml   Output      0 ml   Net    120 ml       Nutrition  Orders Placed This Encounter   Procedures   • DIET ORDER     Standing Status: Standing      Number of Occurrences: 1      Standing Expiration Date:      Order Specific Question:  Diet:     Answer:  Full Liquid [11]     Order Specific Question:  Consistency/Fluid modifications:     Answer:  Nectar Thick [2]     Order Specific Question:  Miscellaneous modifications:     Answer:  SLP - 1:1 Supervision by Nursing [21]     Order Specific Question:  Miscellaneous modifications:     Answer:  SLP - Deliver to Nursing Station [22]     Physical Exam   Constitutional: He appears well-nourished. No distress.   HENT:   Head: Normocephalic and atraumatic.   Oropharyngeal mucosa dry   Eyes: Conjunctivae are normal. Right eye exhibits no discharge. Left eye exhibits no discharge.   Neck: Neck supple.   Cardiovascular: Normal rate and regular rhythm.  Exam reveals no friction rub.    No murmur heard.  Pulmonary/Chest: No respiratory distress. He has no wheezes. He has rales.   Abdominal: He exhibits distension. There is no tenderness. There is no rebound and no guarding.   Musculoskeletal: He exhibits no edema.   Thenar atrophy.   Neurological: He is alert.   Not oriented to time place or person   Skin: Skin is warm and dry.   Psychiatric:   Confused   Nursing note and vitals reviewed.      Recent Labs      07/09/17   0450  07/10/17   0417  07/11/17   0428   WBC  7.8  7.0  11.0*   RBC  2.97*  2.99*  3.41*   HEMOGLOBIN  9.4*  9.7*  11.0*   HEMATOCRIT  29.4*  29.4*  33.6*   MCV  99.0*  98.3*  98.5*   MCH  31.6  32.4  32.3   MCHC  32.0*  33.0*  32.7*   RDW  47.8  46.0  47.5   PLATELETCT  396  370  387   MPV  9.5  9.5  10.1     Recent Labs      07/09/17   0450  07/10/17   0417  07/11/17   0428   SODIUM  139  137  140   POTASSIUM  3.4*   3.8  3.9   CHLORIDE  107  108  106   CO2  27  27  27   GLUCOSE  163*  145*  123*   BUN  29*  23*  18   CREATININE  0.50  0.53  0.64   CALCIUM  8.4*  8.7  9.2                      Assessment/Plan     Encephalopathy acute (present on admission)  Assessment & Plan  Improving  s/p significant alcohol detox, intubation and remains very confused and agitated. On seroquel and restraints  Decrease Seroquel to 50 mg QHS  CT head negative for acute process, reveals atrophy and microvascular ischemic changes  Ammonia WNL  EEG reveals Mild to moderate diffuse cerebral dysfunction, suggestive of an encephalopathic state  Avoid Ativan  Haldol PRN for agitation  Continue frequent reorientation      Hypernatremia  Assessment & Plan  Resolved      Aspiration pneumonitis ([facility]) (present on admission)  Assessment & Plan  Extubated on 6/24 but required re-intubation and extubated again on 7/3.  BAL with pseudomonas and klebsiella  Completed 10 day course of zosyn  Continue nothing by mouth and cor tract    Respiratory failure ([facility]) (present on admission)  Assessment & Plan  s/p intubation x2  Pulmonary consulted and following  Currently on 2L of oxygen  Continue RT protocol        Alcohol abuse (present on admission)  Assessment & Plan  Out of withdrawal window  Continue Folate, thiamine    Anemia (present on admission)  Assessment & Plan  Macrocytic  Folate, B12 and TSH WNL  No sign of acute bleed.  Hemoglobin has been in the 8-10 range  Continue PPi    Hypokalemia (present on admission)  Assessment & Plan  Supplement with diuresis    Type 2 diabetes mellitus ([facility]) (present on admission)  Assessment & Plan  Sliding scale insulin    Dysphagia (present on admission)  Assessment & Plan  Improved as seen on fees  DC cor track  Otherwise patient has had chronic dysphagia and has been evaluated by ENT  Continue speech therapy  Follow-up with ENT as an outpatient      Labs reviewed, Medications reviewed and Radiology images  reviewed        DVT Prophylaxis: Enoxaparin (Lovenox)           For complexity-based billing, please refer to the history, exam, and decison making above. In adition, I spent 25 minutes caring for the patient today. More than 50% of the time was spent counseling and coordinating care.    I have discussed with RN and CM and SW and other consultants about patient's plan.

## 2017-07-11 NOTE — PROGRESS NOTES
Relayed to Dr. Lopez and Hospitalist RN about bedside RN and wife's conversation on the phone this AM (about pt DC'ing tomorrow)

## 2017-07-11 NOTE — CARE PLAN
Problem: Safety  Goal: Will remain free from falls  Intervention: Implement fall precautions  Fall identifiers in place. Treaded socks on patient. Call light within reach. Assisting as needed. Re positioning patient accordingly.       Problem: Skin Integrity  Goal: Risk for impaired skin integrity will decrease  Intervention: Assess risk factors for impaired skin integrity and/or pressure ulcers  Check skin when changing sheets and every time the patient is wet for any breakdown. Turn and reposition as needed. Moisturize as needed.

## 2017-07-11 NOTE — PROGRESS NOTES
Per wife of pt, [facility] is not able to take the patient. Pt's wife, Dara, wants to take pt [facility] tomorrow at 12NN with home health. Dara wants pt to be at [facility] to be close to her.  Ananda maurer MD and CHIP.

## 2017-07-11 NOTE — DISCHARGE PLANNING
Attempted to follow up with [facility], however, no answer. Voicemail left for Shannon.

## 2017-07-11 NOTE — THERAPY
"Occupational Therapy Treatment completed with focus on ADLs, ADL transfers and patient education.  Functional Status:  Pt seen for OT tx today.  Pt was pleasant and cooperative throughout the session but continues to be limited by weakness, endurance, cognition, and self care.  Pt completed supine to sit, sit to stand, and stand pivot with CGA with max verbal and tactile cues to attend to task, follow through, initiate, and problem solve.  Pt is mod A for LB dressing and min A for toileting.  Pt declines to stand for more than 10 seconds before sitting despite cues but with redirection stands back up.  It took 5 sit to stand to complete breif change and pericare.  Pt would benefit from continued inpt OT to increase independence with functional transfers, functional endurance, and ADLs.  Plan of Care: Will benefit from Occupational Therapy 3 times per week  Discharge Recommendations:  Equipment Will Continue to Assess for Equipment Needs. Post-acute therapy Discharge to a transitional care facility for continued skilled therapy services.    See \"Rehab Therapy-Acute\" Patient Summary Report for complete documentation.   "

## 2017-07-11 NOTE — CARE PLAN
Problem: Knowledge Deficit  Goal: Knowledge of the prescribed therapeutic regimen will improve  Outcome: PROGRESSING AS EXPECTED  Pt compliant with his medication regimen this AM    Problem: Discharge Barriers/Planning  Goal: Patient’s continuum of care needs will be met  Outcome: PROGRESSING AS EXPECTED  Per report by NOC, pt's wife want pt to be DC'd today    Problem: Pain Management  Goal: Pain level will decrease to patient’s comfort goal  Outcome: PROGRESSING AS EXPECTED  Denies pain upon assessment

## 2017-07-11 NOTE — PROGRESS NOTES
Received report at 1900 and assumed care. Patient is in bed AAO x 3, disoriented to place. He still sounds very wet but is on RA. He is incontinent of urine and keeps pulling off his condom catheter. He doesn't call when he has to go to the bathroom and also doesn't use the urinal. No IV access since he removed them himself but per report this ok. He also keeps pulling on his SCDs and they are currently off since he ripped them. He is sitting up in bed but no matter how many times he is repositioned he keeps on drifting to the right side and is often times found to be completely diagonal in bed. Wrist restraints are off. Patient was medicated per MAR for back pain that he stated was a 7. Tolerated medications crushed in apple sauce. Bed is locked and in low position. Call light is within reach. Educated on using urinal and calling for assistance. Hourly rounding in place.

## 2017-07-11 NOTE — PROGRESS NOTES
Spoke to Dara, pt wife. She states that Elenita from [facility] told her they are able to come to the home for HH services. Informed wife that as per SW conversation and Elenita differs from this. Dara also stated pt would not ever be alone, that her sister would be with him at the home. Dara will speak to Elenita at 1600 today and is planning on picking up patient from hospital to take home tomorrow.

## 2017-07-12 ENCOUNTER — APPOINTMENT (OUTPATIENT)
Dept: RADIOLOGY | Facility: MEDICAL CENTER | Age: 77
DRG: 163 | End: 2017-07-12
Attending: INTERNAL MEDICINE
Payer: MEDICARE

## 2017-07-12 ENCOUNTER — PATIENT OUTREACH (OUTPATIENT)
Dept: HEALTH INFORMATION MANAGEMENT | Facility: OTHER | Age: 77
End: 2017-07-12

## 2017-07-12 VITALS
DIASTOLIC BLOOD PRESSURE: 56 MMHG | HEART RATE: 83 BPM | OXYGEN SATURATION: 100 % | HEIGHT: 71 IN | TEMPERATURE: 99.1 F | SYSTOLIC BLOOD PRESSURE: 132 MMHG | RESPIRATION RATE: 16 BRPM | BODY MASS INDEX: 23.06 KG/M2 | WEIGHT: 164.68 LBS

## 2017-07-12 PROBLEM — E87.6 HYPOKALEMIA: Status: RESOLVED | Noted: 2017-06-24 | Resolved: 2017-07-12

## 2017-07-12 PROBLEM — E87.0 HYPERNATREMIA: Status: RESOLVED | Noted: 2017-07-06 | Resolved: 2017-07-12

## 2017-07-12 PROBLEM — J96.90 RESPIRATORY FAILURE (HCC): Status: RESOLVED | Noted: 2017-06-23 | Resolved: 2017-07-12

## 2017-07-12 LAB
ANION GAP SERPL CALC-SCNC: 6 MMOL/L (ref 0–11.9)
BASOPHILS # BLD AUTO: 0.6 % (ref 0–1.8)
BASOPHILS # BLD: 0.04 K/UL (ref 0–0.12)
BUN SERPL-MCNC: 16 MG/DL (ref 8–22)
CALCIUM SERPL-MCNC: 9.4 MG/DL (ref 8.5–10.5)
CHLORIDE SERPL-SCNC: 105 MMOL/L (ref 96–112)
CO2 SERPL-SCNC: 28 MMOL/L (ref 20–33)
CREAT SERPL-MCNC: 0.64 MG/DL (ref 0.5–1.4)
EKG IMPRESSION: NORMAL
EOSINOPHIL # BLD AUTO: 0.17 K/UL (ref 0–0.51)
EOSINOPHIL NFR BLD: 2.7 % (ref 0–6.9)
ERYTHROCYTE [DISTWIDTH] IN BLOOD BY AUTOMATED COUNT: 47.8 FL (ref 35.9–50)
GFR SERPL CREATININE-BSD FRML MDRD: >60 ML/MIN/1.73 M 2
GLUCOSE BLD-MCNC: 103 MG/DL (ref 65–99)
GLUCOSE BLD-MCNC: 114 MG/DL (ref 65–99)
GLUCOSE BLD-MCNC: 184 MG/DL (ref 65–99)
GLUCOSE BLD-MCNC: 99 MG/DL (ref 65–99)
GLUCOSE SERPL-MCNC: 118 MG/DL (ref 65–99)
HCT VFR BLD AUTO: 35.3 % (ref 42–52)
HGB BLD-MCNC: 11.5 G/DL (ref 14–18)
IMM GRANULOCYTES # BLD AUTO: 0.02 K/UL (ref 0–0.11)
IMM GRANULOCYTES NFR BLD AUTO: 0.3 % (ref 0–0.9)
LYMPHOCYTES # BLD AUTO: 1.48 K/UL (ref 1–4.8)
LYMPHOCYTES NFR BLD: 23.3 % (ref 22–41)
MCH RBC QN AUTO: 32.2 PG (ref 27–33)
MCHC RBC AUTO-ENTMCNC: 32.6 G/DL (ref 33.7–35.3)
MCV RBC AUTO: 98.9 FL (ref 81.4–97.8)
MONOCYTES # BLD AUTO: 0.53 K/UL (ref 0–0.85)
MONOCYTES NFR BLD AUTO: 8.3 % (ref 0–13.4)
NEUTROPHILS # BLD AUTO: 4.12 K/UL (ref 1.82–7.42)
NEUTROPHILS NFR BLD: 64.8 % (ref 44–72)
NRBC # BLD AUTO: 0 K/UL
NRBC BLD AUTO-RTO: 0 /100 WBC
PLATELET # BLD AUTO: 378 K/UL (ref 164–446)
PMV BLD AUTO: 10.4 FL (ref 9–12.9)
POTASSIUM SERPL-SCNC: 3.7 MMOL/L (ref 3.6–5.5)
RBC # BLD AUTO: 3.57 M/UL (ref 4.7–6.1)
SODIUM SERPL-SCNC: 139 MMOL/L (ref 135–145)
WBC # BLD AUTO: 6.4 K/UL (ref 4.8–10.8)

## 2017-07-12 PROCEDURE — A9270 NON-COVERED ITEM OR SERVICE: HCPCS | Performed by: INTERNAL MEDICINE

## 2017-07-12 PROCEDURE — 700102 HCHG RX REV CODE 250 W/ 637 OVERRIDE(OP): Performed by: INTERNAL MEDICINE

## 2017-07-12 PROCEDURE — 82962 GLUCOSE BLOOD TEST: CPT

## 2017-07-12 PROCEDURE — 93005 ELECTROCARDIOGRAM TRACING: CPT | Performed by: INTERNAL MEDICINE

## 2017-07-12 PROCEDURE — 99239 HOSP IP/OBS DSCHRG MGMT >30: CPT | Performed by: INTERNAL MEDICINE

## 2017-07-12 PROCEDURE — 36415 COLL VENOUS BLD VENIPUNCTURE: CPT

## 2017-07-12 PROCEDURE — 71010 DX-CHEST-PORTABLE (1 VIEW): CPT

## 2017-07-12 PROCEDURE — A9270 NON-COVERED ITEM OR SERVICE: HCPCS | Performed by: HOSPITALIST

## 2017-07-12 PROCEDURE — 85025 COMPLETE CBC W/AUTO DIFF WBC: CPT

## 2017-07-12 PROCEDURE — A9270 NON-COVERED ITEM OR SERVICE: HCPCS

## 2017-07-12 PROCEDURE — 700102 HCHG RX REV CODE 250 W/ 637 OVERRIDE(OP): Performed by: HOSPITALIST

## 2017-07-12 PROCEDURE — 700111 HCHG RX REV CODE 636 W/ 250 OVERRIDE (IP): Performed by: INTERNAL MEDICINE

## 2017-07-12 PROCEDURE — 97530 THERAPEUTIC ACTIVITIES: CPT

## 2017-07-12 PROCEDURE — 80048 BASIC METABOLIC PNL TOTAL CA: CPT

## 2017-07-12 PROCEDURE — 93010 ELECTROCARDIOGRAM REPORT: CPT | Performed by: INTERNAL MEDICINE

## 2017-07-12 PROCEDURE — 700102 HCHG RX REV CODE 250 W/ 637 OVERRIDE(OP)

## 2017-07-12 RX ORDER — QUETIAPINE FUMARATE 25 MG/1
25 TABLET, FILM COATED ORAL EVERY EVENING
Qty: 60 TAB | Refills: 0 | Status: SHIPPED | OUTPATIENT
Start: 2017-07-12

## 2017-07-12 RX ORDER — TIOTROPIUM BROMIDE 18 UG/1
18 CAPSULE ORAL; RESPIRATORY (INHALATION) DAILY
Qty: 30 CAP | Refills: 3 | Status: SHIPPED | OUTPATIENT
Start: 2017-07-12

## 2017-07-12 RX ORDER — LANOLIN ALCOHOL/MO/W.PET/CERES
100 CREAM (GRAM) TOPICAL DAILY
Qty: 30 TAB | Refills: 0 | Status: SHIPPED | OUTPATIENT
Start: 2017-07-12

## 2017-07-12 RX ORDER — QUETIAPINE FUMARATE 25 MG/1
25 TABLET, FILM COATED ORAL EVERY EVENING
Status: DISCONTINUED | OUTPATIENT
Start: 2017-07-12 | End: 2017-07-12 | Stop reason: HOSPADM

## 2017-07-12 RX ORDER — OXYCODONE HYDROCHLORIDE 5 MG/1
5 TABLET ORAL EVERY 4 HOURS PRN
Qty: 10 TAB | Refills: 0 | Status: SHIPPED | OUTPATIENT
Start: 2017-07-12

## 2017-07-12 RX ORDER — FOLIC ACID 1 MG/1
1 TABLET ORAL DAILY
Qty: 30 TAB | Refills: 0 | Status: SHIPPED | OUTPATIENT
Start: 2017-07-12

## 2017-07-12 RX ADMIN — Medication 100 MG: at 08:28

## 2017-07-12 RX ADMIN — THERA TABS 1 TABLET: TAB at 08:28

## 2017-07-12 RX ADMIN — TIOTROPIUM BROMIDE 1 CAPSULE: 18 CAPSULE ORAL; RESPIRATORY (INHALATION) at 08:29

## 2017-07-12 RX ADMIN — FOLIC ACID 1 MG: 1 TABLET ORAL at 08:29

## 2017-07-12 RX ADMIN — OMEPRAZOLE 20 MG: 20 CAPSULE, DELAYED RELEASE ORAL at 08:29

## 2017-07-12 RX ADMIN — GLIPIZIDE 2.5 MG: 2.5 TABLET, EXTENDED RELEASE ORAL at 08:29

## 2017-07-12 RX ADMIN — ENOXAPARIN SODIUM 40 MG: 100 INJECTION SUBCUTANEOUS at 08:28

## 2017-07-12 ASSESSMENT — PAIN SCALES - GENERAL
PAINLEVEL_OUTOF10: ASSUMED PAIN PRESENT
PAINLEVEL_OUTOF10: 0

## 2017-07-12 ASSESSMENT — GAIT ASSESSMENTS: GAIT LEVEL OF ASSIST: UNABLE TO PARTICIPATE

## 2017-07-12 ASSESSMENT — COGNITIVE AND FUNCTIONAL STATUS - GENERAL
SUGGESTED CMS G CODE MODIFIER MOBILITY: CL
CLIMB 3 TO 5 STEPS WITH RAILING: A LOT
MOVING TO AND FROM BED TO CHAIR: A LITTLE
WALKING IN HOSPITAL ROOM: A LOT
MOVING FROM LYING ON BACK TO SITTING ON SIDE OF FLAT BED: A LOT
TURNING FROM BACK TO SIDE WHILE IN FLAT BAD: A LITTLE
MOBILITY SCORE: 14
STANDING UP FROM CHAIR USING ARMS: A LOT

## 2017-07-12 ASSESSMENT — LIFESTYLE VARIABLES: EVER_SMOKED: YES

## 2017-07-12 NOTE — CODE DOCUMENTATION
MD and rapid team at bedside, EKG completed and interpreted by CIC nurse and MD, EKG normal, chest xray still pending, no additional orders needed per MD.

## 2017-07-12 NOTE — DISCHARGE PLANNING
[facility] will accept the patient with some limitations. HH would provide nursing care until more stable, then transition to OP therapy (they do not go to the area the patient lives). CHIP Lopez notified.

## 2017-07-12 NOTE — PROGRESS NOTES
"0910Pt. Stated he is wet and needed to be changed, this RN lay him flat and was asked to turn to his side. Afterwards, pt. Complaint of chest pain holding his R side of chest and facial grimace noted and was tearful, pt. Stated \"my chest hurts\". Pt. Sat up in bed. RRT called, ordered stat EKG.     0915 RRT at bedside, hospitalist MD updated and RN. EKG seen by Dr. Lopez and is clear per MD. Pt. States now it does not hurt. Ordered Chest Xray per MD, pending. Will ff up pt.   "

## 2017-07-12 NOTE — THERAPY
"Physical Therapy Treatment completed.   Bed Mobility:  Supine to Sit: Contact Guard Assist  Transfers: Sit to Stand: Moderate Assist  Gait: Level Of Assist: Unable to Participate with Front-Wheel Walker       Plan of Care: Will benefit from Physical Therapy 3 times per week and Plan to complete next treatment by Friday 7/14  Discharge Recommendations: Equipment: Will Continue to Assess for Equipment Needs. Post-acute therapy Discharge to a transitional care facility for continued skilled therapy services.    Pt continues to dmeonstrate difficulty with funcitonal mobility, strength, transfers, inability to ambulate, cognitive deficits and poor safety awareness. Pt required CGA for supine to sit with HOB elevated relying heavily on use of rails. Fair balance seated EOB. Completed sit to stand with moderate assist. Once standing, pt having significant difficulty with weight shifitng and stepping. Attempted to transfer to  X 3 but very poor ability to carry out motor movements and pt would unexpectedly sit on bed during transfer. Pt is unaware of deficits. Pt potentially has apraxia and overall poor motor control and sequencing. Of note, pt asked for something to drink during session. Given thickened apple juice and had 1 bout of  coughing while drinking and voice sounded wet and gurgly after drinking juice. Given multiple deficits listed and the fact that pt is an extremely high fall risk DC home is not recommended at this time. Pt would benefit from post acute transitional care in the SNF setting    See \"Rehab Therapy-Acute\" Patient Summary Report for complete documentation.       "

## 2017-07-12 NOTE — DISCHARGE SUMMARY
Hospital Medicine Discharge Note     Admit Date:  6/23/2017       Discharge Date:   7/12/2017    Attending Physician:  Leeanne Lopez     Diagnoses (includes active and resolved):   Active Problems:    Encephalopathy acute POA: Yes    Aspiration pneumonitis (CMS-[facility]) POA: Yes    Alcohol abuse POA: Yes    Anemia POA: Yes    Type 2 diabetes mellitus (CMS-[facility]) POA: Yes    Dysphagia POA: Yes  Resolved Problems:    Hypernatremia POA: Unknown    Respiratory failure (CMS-[facility]) POA: Yes    Hypokalemia POA: Yes    Encephalopathy POA: Unknown    Chief Complain  Transferred from another facility with alcohol intoxication, falls, intubation.    Hosiery of Present Illness  This was transfer from [facility].     Patient was seen by Dr. Gaspar of trauma.  The patient came in as a trauma    from [facility] after having a fall.   .   Detailed info pls see H&P by Dr. Panchal    [facility] Summary (Brief Narrative):       77 y.o. male admitted 6/23/2017 transferred from [facility] with alcohol intoxication, ground level fall,   intubated at [facility], extubated here and required re-intubation and re-extubated on 7/3. Apparently   patient was transferred from the hospital for trauma. Patient was intubated in the ICU initially and then successfully   extubated however patient developed respiratory failure and required reintubation and be extubated on July 3.   Patient was transferred out from ICU to the floor and remained stable. However patient still demonstrated   encephalopathy and he currently still lack of capacity to take care of himself. Patient's family wants to take   patient home. It is determined that patient will not be safe at home by himself. Patient required at least a home   health nurse checking Frequently. Patient was treated with RT, O2, and also detoxification procedure. Patient   currently remains stable. Patient had chronic dysphagia and  initially patient's has tube feeding however patient    family wants feeding tube out and patient passed FEES test. Patient currently is stable and ready to be discharged   home and home health and the family needs to take care of patient at home.     Encephalopathy acute (present on admission)  Assessment & Plan  Improving  s/p significant alcohol detox, intubation and remains very confused and agitated. On seroquel and restraints  Decrease Seroquel to 50 mg QHS  CT head negative for acute process, reveals atrophy and microvascular ischemic changes  Ammonia WNL  EEG reveals Mild to moderate diffuse cerebral dysfunction, suggestive of an encephalopathic state  Avoid Ativan  Haldol PRN for agitation  Continue frequent reorientation      Hypernatremia  Assessment & Plan  Resolved      Aspiration pneumonitis (CMS-[facility]) (present on admission)  Assessment & Plan  Extubated on 6/24 but required re-intubation and extubated again on 7/3.  BAL with pseudomonas and klebsiella  Completed 10 day course of zosyn  Continue nothing by mouth and cor tract    Respiratory failure (CMS-[facility]) (present on admission)  Assessment & Plan  s/p intubation x2  Pulmonary consulted and following  Currently on 2L of oxygen  Continue RT protocol        Alcohol abuse (present on admission)  Assessment & Plan  Out of withdrawal window  Continue Folate, thiamine    Anemia (present on admission)  Assessment & Plan  Macrocytic  Folate, B12 and TSH WNL  No sign of acute bleed.  Hemoglobin has been in the 8-10 range  Continue PPi    Hypokalemia (present on admission)  Assessment & Plan  Supplement with diuresis    Type 2 diabetes mellitus (CMS-[facility]) (present on admission)  Assessment & Plan  Sliding scale insulin    Dysphagia (present on admission)  Assessment & Plan  Improved as seen on fees  DC cor track  Otherwise patient has had chronic dysphagia and has been evaluated by ENT  Continue speech therapy  Follow-up with ENT as an outpatient    Consultants:       Spoke  Dr. Dickson    Procedures:         Intubation     Discharge Medications:        (x)  Medication Reconciliation Completed       Medication List      START taking these medications       Instructions    folic acid 1 MG Tabs   Last time this was given:  1 mg on 7/12/2017  8:29 AM   Commonly known as:  FOLVITE    Take 1 Tab by mouth every day.   Dose:  1 mg       oxycodone immediate-release 5 MG Tabs   Last time this was given:  10 mg on 7/11/2017 10:00 PM   Commonly known as:  ROXICODONE    Take 1 Tab by mouth every four hours as needed.   Dose:  5 mg       quetiapine 25 MG Tabs   Last time this was given:  50 mg on 7/11/2017  8:32 PM   Commonly known as:  SEROQUEL    Take 1 Tab by mouth every evening.   Dose:  25 mg       thiamine 100 MG tablet   Last time this was given:  100 mg on 7/12/2017  8:28 AM   Commonly known as:  THIAMINE    Take 1 Tab by mouth every day.   Dose:  100 mg       tiotropium 18 MCG Caps   Last time this was given:  1 Cap on 7/12/2017  8:29 AM   Commonly known as:  SPIRIVA    Inhale 1 Cap by mouth every day.   Dose:  18 mcg         CONTINUE taking these medications       Instructions    aspirin 81 MG Chew chewable tablet   Last time this was given:  81 mg on 7/11/2017  2:31 PM   Commonly known as:  ASA    Take 81 mg by mouth 2 times a day.   Dose:  81 mg       CENTRUM SILVER ADULT 50+ PO    Take  by mouth every day.       DIGESTIVE ADVANTAGE Caps    Take  by mouth.       finasteride 5 MG Tabs   Commonly known as:  PROSCAR    Take 5 mg by mouth every day.   Dose:  5 mg       glipiZIDE SR 2.5 MG Tb24   Last time this was given:  2.5 mg on 7/12/2017  8:29 AM   Commonly known as:  GLUCOTROL    Take 2.5 mg by mouth every day.   Dose:  2.5 mg       glucosamine Sulfate 500 MG Caps    Take 500 mg by mouth 3 times a day, with meals.   Dose:  500 mg       metformin  MG Tb24   Commonly known as:  GLUCOPHAGE XR    Take 500 mg by mouth every day.   Dose:  500 mg       pantoprazole 40 MG Tbec   Commonly known as:  PROTONIX    Take 40 mg  by mouth every day.   Dose:  40 mg       quiNINE 324 MG capsule    Take 324 mg by mouth every evening.   Dose:  324 mg       simvastatin 40 MG Tabs   Last time this was given:  40 mg on 7/11/2017  8:32 PM   Commonly known as:  ZOCOR    Take 40 mg by mouth every evening.   Dose:  40 mg       tamsulosin 0.4 MG capsule   Commonly known as:  FLOMAX    Take 0.4 mg by mouth ONE-HALF HOUR AFTER BREAKFAST.   Dose:  0.4 mg             Disposition:   Discharge home    Diet:   Advance slowly    Activity:   As tolerated    Code status:   Full code    Primary Care Provider:    Pcp Not In Computer    Follow up appointment details :      PCP in 2 weeks  No follow-up provider specified.  No future appointments.    Pending Studies:        None    Time spent on discharge day patient visit: >35 minutes    #################################################    Interval history/exam for day of discharge:    Filed Vitals:    07/12/17 0326 07/12/17 0900 07/12/17 0915 07/12/17 0921   BP: 116/72 114/74 132/56    Pulse: 69 83 83    Temp: 36.2 °C (97.1 °F) 36.7 °C (98.1 °F)  37.3 °C (99.1 °F)   Resp: 16 16     Height:       Weight:       SpO2: 93% 90% 100%      Weight/BMI: Body mass index is 22.98 kg/(m^2).  Pulse Oximetry: 100 % (2 liters), O2 (LPM): 0, O2 Delivery: None (Room Air)    Gen: AAOx1, NAD  Eyes: PELLA  Neck: no JVD, no lymphadenopathy  Cardia: RRR, no mrg  Lungs: CTAB, no rales, rhonci or wheezing  Abd: He exhibits distension. There is no tenderness. There is no rebound and no guarding.   EXT: No C/C/E, peripheral pulse 2+ b/l  Skin: Intact, no lesion, warm  Psych: Appropriate.    Most Recent Labs:    Lab Results   Component Value Date/Time    WBC 6.4 07/12/2017 05:01 AM    RBC 3.57* 07/12/2017 05:01 AM    HEMOGLOBIN 11.5* 07/12/2017 05:01 AM    HEMATOCRIT 35.3* 07/12/2017 05:01 AM    MCV 98.9* 07/12/2017 05:01 AM    MCH 32.2 07/12/2017 05:01 AM    MCHC 32.6* 07/12/2017 05:01 AM    MPV 10.4 07/12/2017 05:01 AM    NEUTROPHILS-POLYS  64.80 07/12/2017 05:01 AM    LYMPHOCYTES 23.30 07/12/2017 05:01 AM    MONOCYTES 8.30 07/12/2017 05:01 AM    EOSINOPHILS 2.70 07/12/2017 05:01 AM    BASOPHILS 0.60 07/12/2017 05:01 AM      Lab Results   Component Value Date/Time    SODIUM 139 07/12/2017 05:01 AM    POTASSIUM 3.7 07/12/2017 05:01 AM    CHLORIDE 105 07/12/2017 05:01 AM    CO2 28 07/12/2017 05:01 AM    GLUCOSE 118* 07/12/2017 05:01 AM    BUN 16 07/12/2017 05:01 AM    CREATININE 0.64 07/12/2017 05:01 AM      Lab Results   Component Value Date/Time    ALT(SGPT) 18 07/10/2017 04:17 AM    AST(SGOT) 12 07/10/2017 04:17 AM    ALKALINE PHOSPHATASE 103* 07/10/2017 04:17 AM    TOTAL BILIRUBIN 0.3 07/10/2017 04:17 AM    ALBUMIN 3.1* 07/10/2017 04:17 AM    GLOBULIN 2.7 07/10/2017 04:17 AM    PRE-ALBUMIN 22.0 07/10/2017 04:17 AM    INR 1.08 06/23/2017 04:21 AM     Lab Results   Component Value Date/Time    PT 14.3 06/23/2017 04:21 AM    INR 1.08 06/23/2017 04:21 AM        Imaging/ Testing:      DX-CHEST-PORTABLE (1 VIEW)   Final Result      No acute cardiopulmonary disease evident.      DX-CHEST-PORTABLE (1 VIEW)   Final Result      Mild lung base atelectasis similar to recent findings.      DX-ABDOMEN FOR TUBE PLACEMENT   Final Result      1.  Enteric tube tip projects over the duodenojejunal junction   2.  LEFT basilar atelectasis or pneumonia      DX-ABDOMEN FOR TUBE PLACEMENT   Final Result      Enteric tube has been placed and the tip projects over the junction of the duodenum and proximal jejunum.      DX-ABDOMEN FOR TUBE PLACEMENT   Final Result      Cortrak nasogastric tube position consistent with intragastric placement.      CT-HEAD W/O   Final Result         NO ACUTE ABNORMALITIES ARE NOTED ON CT SCAN OF THE HEAD.      Air-fluid level in left maxillary sinus could indicate sinusitis.      Findings are consistent with atrophy.  Decreased attenuation in the periventricular white matter likely indicates microvascular ischemic disease.      DX-ABDOMEN FOR  TUBE PLACEMENT   Final Result      Feeding tube tip overlies the gastric antrum.      DX-ABDOMEN FOR TUBE PLACEMENT   Final Result      Cortrak feeding tube tip projects in the region of the distal duodenum near the ligament of Treitz.      DX-CHEST-PORTABLE (1 VIEW)   Final Result      Interval extubation with persistent bibasilar atelectasis, left greater than right.      DX-ABDOMEN FOR TUBE PLACEMENT   Final Result      Enteric tube projects over the antrum of the stomach and possibly the proximal duodenum.      DX-CHEST-PORTABLE (1 VIEW)   Final Result         1. No significant interval change.            DX-CHEST-PORTABLE (1 VIEW)   Final Result         1. No significant interval change.         DX-CHEST-PORTABLE (1 VIEW)   Final Result         1. No significant interval change.      DX-CHEST-PORTABLE (1 VIEW)   Final Result      1.  Unchanged BILATERAL perihilar and bibasilar atelectasis with possible superimposed pulmonary edema   2.  Probable layering BILATERAL pleural effusions      DX-CHEST-PORTABLE (1 VIEW)   Final Result      Stable enlargement of the cardiomediastinal silhouette, mild diffuse interstitial edema and bilateral lower lobe atelectasis and/or consolidation.      DX-CHEST-PORTABLE (1 VIEW)   Final Result         1. No significant interval change.      DX-CHEST-PORTABLE (1 VIEW)   Final Result         1.  Pulmonary edema and/or infiltrates are identified, which are stable since the prior exam.      DX-CHEST-PORTABLE (1 VIEW)   Final Result         1.  Pulmonary edema and/or infiltrates are identified, which are stable since the prior exam.   2.  Linear density at the left lung base suggests atelectasis.      DX-CHEST-PORTABLE (1 VIEW)   Final Result      Placement of right internal jugular catheter with tip projecting appropriately over the SVC      CT-ABDOMEN-PELVIS WITH   Final Result      1.  No acute abnormality within the abdomen or pelvis      2.  Bilateral atelectasis and small  bilateral pleural effusion      3.  Left lower lobe consolidation which could be atelectasis or pneumonia      4.  Aortic atherosclerotic plaque      5.  Bladder wall thickening      6.  Bladder catheter and enteric catheter appear appropriately located      7.  Possibly prior appendectomy      DX-ABDOMEN FOR TUBE PLACEMENT   Final Result      Feeding tube in place as noted above.      DX-CHEST-PORTABLE (1 VIEW)   Final Result      Bibasilar opacities, consistent with atelectasis, without significant change.      DX-CHEST-PORTABLE (1 VIEW)   Final Result      1.  Improving bibasilar interstitial edema or pneumonia.      2.  Persistent focal atelectasis or pneumonia in the left lower lobe with small left pleural effusion.      DX-CHEST-PORTABLE (1 VIEW)   Final Result      New bibasilar atelectasis.      DX-ABDOMEN FOR TUBE PLACEMENT   Final Result      Enteric tube has been placed and the tip projects over the stomach.      DX-ABDOMEN FOR TUBE PLACEMENT   Final Result      Enteric tube projects over the stomach.      DX-CHEST-LIMITED (1 VIEW)   Final Result      Unremarkable chest.               INTERPRETING LOCATION: [address] [place] [address], [facility]      OUTSIDE IMAGES-CT CERVICAL SPINE   Preliminary Result      OUTSIDE IMAGES-CT FACE   Preliminary Result      OUTSIDE IMAGES-CT HEAD   Preliminary Result      OUTSIDE IMAGES-DX CHEST   Preliminary Result      OUTSIDE IMAGES-DX CHEST   Preliminary Result          Instructions:      The patient was instructed to return to the ER in the event of worsening symptoms. I have counseled the patient on the importance of compliance and the patient has agreed to proceed with all medical recommendations and follow up plan indicated above.   The patient understands that all medications come with benefits and risks. Risks may include permanent injury or death and these risks can be minimized with close reassessment and monitoring.

## 2017-07-12 NOTE — CODE DOCUMENTATION
Nurse in to clean up patients sheets, laid him down flat and patient complained of chest pain. Rapid response called for chest pain

## 2017-07-12 NOTE — PROGRESS NOTES
Pt. To dc to home with [facility] dc instructions given to the pt. Wife, no PIV to be removed. All belongings were sent home with the pt. Answered wifes questions with HH. Scripts provided for the pt. Needs attended.

## 2017-07-12 NOTE — PROGRESS NOTES
Assumed care of pt. Awake, A&Ox1, denies any complaint of pain/discomfort at the time of assx. Overlay mattress in place, good skin condition. Noted productive cough with strong effort to cough it. Fall prec in place, bed alarm is on. Encouraged use of IS. Skin preventive measures in place like overlay mattress and floated heels. Hourly checks in place. No needs at this time.

## 2017-07-12 NOTE — CARE PLAN
Problem: Safety  Goal: Will remain free from falls  Outcome: PROGRESSING AS EXPECTED  Fall prec in place. Bed alarm is on. Hourly rounding in place.     Problem: Discharge Barriers/Planning  Goal: Patient’s continuum of care needs will be met  Outcome: PROGRESSING AS EXPECTED  Per report, plan pt. To go home with wife w/HH services. MD CHIP and hospitalist RN working on it.     Problem: Skin Integrity  Goal: Risk for impaired skin integrity will decrease  Outcome: PROGRESSING AS EXPECTED  Overlay mattress in place. Q2 turns in place.

## 2017-07-12 NOTE — DISCHARGE INSTRUCTIONS
Discharge Instructions    Discharged to home by car with relative. Discharged via wheelchair, hospital escort: Yes.  Special equipment needed: Not Applicable    Be sure to schedule a follow-up appointment with your primary care doctor or any specialists as instructed.     Discharge Plan:   Diet Plan: Discussed (diabetic diet PUREED WITH NECTAR THICKENED LIQUID)  Activity Level: Discussed (ACTIVITY AS TOLERATED)  Smoking Cessation Offered: Patient Refused  Confirmed Follow up Appointment: Patient to Call and Schedule Appointment  Confirmed Symptoms Management: Discussed  Medication Reconciliation Updated: Yes  Influenza Vaccine Indication: Not indicated: Previously immunized this influenza season and > 8 years of age    I understand that a diet low in cholesterol, fat, and sodium is recommended for good health. Unless I have been given specific instructions below for another diet, I accept this instruction as my diet prescription.   Other diet: DIABETIC DIET, PUREED WITH NECTAR THICKENED FLUID    Special Instructions: None    · Is patient discharged on Warfarin / Coumadin?   No     · Is patient Post Blood Transfusion?  No        Depression / Suicide Risk    As you are discharged from this [facility] facility, it is important to learn how to keep safe from harming yourself.    Recognize the warning signs:  · Abrupt changes in personality, positive or negative- including increase in energy   · Giving away possessions  · Change in eating patterns- significant weight changes-  positive or negative  · Change in sleeping patterns- unable to sleep or sleeping all the time   · Unwillingness or inability to communicate  · Depression  · Unusual sadness, discouragement and loneliness  · Talk of wanting to die  · Neglect of personal appearance   · Rebelliousness- reckless behavior  · Withdrawal from people/activities they love  · Confusion- inability to concentrate     If you or a loved one observes any of these behaviors or  has concerns about self-harm, here's what you can do:  · Talk about it- your feelings and reasons for harming yourself  · Remove any means that you might use to hurt yourself (examples: pills, rope, extension cords, firearm)  · Get professional help from the community (Mental Health, Substance Abuse, psychological counseling)  · Do not be alone:Call your Safe Contact- someone whom you trust who will be there for you.  · Call your local CRISIS HOTLINE 365-5785 or 643-136-4404  · Call your local Children's Mobile Crisis Response Team [place] (927) 836-8062 or www.[employer]  · Call the toll free National Suicide Prevention Hotlines   · National Suicide Prevention Lifeline 429-388-IKJG (9052)  · [facility] 800-SUICIDE (131-7542)            Diabetes and Exercise  Exercising regularly is important. It is not just about losing weight. It has many health benefits, such as:  · Improving your overall fitness, flexibility, and endurance.  · Increasing your bone density.  · Helping with weight control.  · Decreasing your body fat.  · Increasing your muscle strength.  · Reducing stress and tension.  · Improving your overall health.  People with diabetes who exercise gain additional benefits because exercise:  · Reduces appetite.  · Improves the body's use of blood sugar (glucose).  · Helps lower or control blood glucose.  · Decreases blood pressure.  · Helps control blood lipids (such as cholesterol and triglycerides).  · Improves the body's use of the hormone insulin by:  · Increasing the body's insulin sensitivity.  · Reducing the body's insulin needs.  · Decreases the risk for heart disease because exercising:  · Lowers cholesterol and triglycerides levels.  · Increases the levels of good cholesterol (such as high-density lipoproteins [HDL]) in the body.  · Lowers blood glucose levels.  YOUR ACTIVITY PLAN   Choose an activity that you enjoy, and set realistic goals. To exercise safely, you should  begin practicing any new physical activity slowly, and gradually increase the intensity of the exercise over time. Your health care provider or diabetes educator can help create an activity plan that works for you. General recommendations include:  · Encouraging children to engage in at least 60 minutes of physical activity each day.  · Stretching and performing strength training exercises, such as yoga or weight lifting, at least 2 times per week.  · Performing a total of at least 150 minutes of moderate-intensity exercise each week, such as brisk walking or water aerobics.  · Exercising at least 3 days per week, making sure you allow no more than 2 consecutive days to pass without exercising.  · Avoiding long periods of inactivity (90 minutes or more). When you have to spend an extended period of time sitting down, take frequent breaks to walk or stretch.  RECOMMENDATIONS FOR EXERCISING WITH TYPE 1 OR TYPE 2 DIABETES   · Check your blood glucose before exercising. If blood glucose levels are greater than 240 mg/dL, check for urine ketones. Do not exercise if ketones are present.  · Avoid injecting insulin into areas of the body that are going to be exercised. For example, avoid injecting insulin into:  · The arms when playing tennis.  · The legs when jogging.  · Keep a record of:  · Food intake before and after you exercise.  · Expected peak times of insulin action.  · Blood glucose levels before and after you exercise.  · The type and amount of exercise you have done.  · Review your records with your health care provider. Your health care provider will help you to develop guidelines for adjusting food intake and insulin amounts before and after exercising.  · If you take insulin or oral hypoglycemic agents, watch for signs and symptoms of hypoglycemia. They include:  · Dizziness.  · Shaking.  · Sweating.  · Chills.  · Confusion.  · Drink plenty of water while you exercise to prevent dehydration or heat stroke.  Body water is lost during exercise and must be replaced.  · Talk to your health care provider before starting an exercise program to make sure it is safe for you. Remember, almost any type of activity is better than none.     This information is not intended to replace advice given to you by your health care provider. Make sure you discuss any questions you have with your health care provider.     Document Released: 03/09/2005 Document Revised: 05/03/2016 Document Reviewed: 05/27/2014  [employer] Interactive Patient Education ©2016 [employer].            Thickening Liquids for Dysphagia Diet  If you are on the dysphagia diet, you may need to thicken drinks, soups, foods that melt at room temperature, and other liquids before you drink or eat them. Thickening liquids makes them easier to swallow. It also reduces the risk of liquid traveling to your lungs.  To make a thickened liquid you will need to add a commercial thickening product or a soft food to the liquid until it reaches the consistency it needs to be. Your health care provider or dietitian will explain to you the consistency you need to aim for. Liquid consistencies include:  · Thin. Thin liquids include most drinks (such as water, milk, tea, soda, juice, carbonated drinks), as well as ice cream, sherbet, sorbet, ice pops, and broth-based soups.  · Nectar-like. Nectar-like liquids include maple syrup and creamy soup.  · Honey-like. Honey-like liquids are made to be runny but are thick like honey. They cannot be sipped through a straw.  · Spoon-thick. Spoon-thick liquids are thick, like pudding.  MY PLAN  I should thicken my liquids to a _______________ consistency.  DIET GUIDELINES  · Thicken liquids to the consistency your health care provider recommends.  · Follow your dietitian's or health care provider's recommendation on how to thicken your liquids.  · See your dietitian or health care provider regularly for help with your dietary changes.  HOW CAN I  THICKEN MY LIQUIDS?  Liquids can be thickened with a commercial food and beverage thickener or with a soft food.  Commercial Food and Beverage Thickeners  A food and beverage thickener is a powder or gel that makes a food or beverage thicker. Thickeners are sold at pharmacies, medical supply stores, some grocery stores, and online. They can be added to both hot and cold liquids and do not change the taste of the liquid. Ask your health care provider or dietitian for a complete list of commercial thickeners.  Each thickening product is different. Some need to be blended into a liquid with a  while others can be stirred into a liquid with a fork or spoon. Follow the instructions on the product label.  Soft Foods  Some foods such as soups, casseroles, and gravies can be thickened with soft foods. Soft foods include:  · Baby cereal.  · Gravy powder.  · Mashed potato.  · Pureed baby food.  · Instant potato flakes.  · Powdered sauce mixes (such as cheese mixes).  · Flour.  To use one of these soft food items, stir or mix them into the thin liquid until it reaches the desired thickness. Start with a small amount and adjust soft food and liquid as necessary.  Note: Flour works best with warm liquids, such as broth. To thicken a liquid with flour, make a paste out of flour and water. Cook or warm your liquid and add the paste to it. Stir until the mixture thickens.  WHAT ARE SOME TIPS TO MAKE THICKENING LIQUIDS EASIER?  · Take thickeners with you when eating out or traveling.  · If a liquid gets too thick, add more of the thinner liquid until the desired consistency is reached.  · Consider purchasing pre-made thickened drinks.  · Consider using a thickening product to make your own frozen desserts.     This information is not intended to replace advice given to you by your health care provider. Make sure you discuss any questions you have with your health care provider.     Document Released: 06/18/2013 Document  Revised: 12/23/2014 Document Reviewed: 12/01/2014  [employer] Interactive Patient Education ©2016 [employer].              Aspiration Pneumonia  Aspiration pneumonia is an infection in your lungs. It occurs when food, liquid, or stomach contents (vomit) are inhaled (aspirated) into your lungs. When these things get into your lungs, swelling (inflammation) and infection can occur. This can make it difficult for you to breathe. Aspiration pneumonia is a serious condition and can be life threatening.  RISK FACTORS  Aspiration pneumonia is more likely to occur when a person's cough (gag) reflex or ability to swallow has been decreased. Some things that can do this include:   Having a brain injury or disease, such as stroke, seizures, Parkinson's disease, dementia, or amyotrophic lateral sclerosis (ALS).    Being given general anesthetic for procedures.    Being in a coma (unconscious).    Having a narrowing of the tube that carries food to the stomach (esophagus).    Drinking too much alcohol. If a person passes out and vomits, vomit can be swallowed into the lungs.    Taking certain medicines, such as tranquilizers or sedatives.    SIGNS AND SYMPTOMS   Coughing after swallowing food or liquids.    Breathing problems, such as wheezing or shortness of breath.    Bluish skin. This can be caused by lack of oxygen.    Coughing up food or mucus. The mucus might contain blood, greenish material, or yellowish-white fluid (pus).    Fever.    Chest pain.    Being more tired than usual (fatigue).    Sweating more than usual.    Bad breath.    DIAGNOSIS   A physical exam will be done. During the exam, the health care provider will listen to your lungs with a stethoscope to check for:   Crackling sounds in the lungs.  Decreased breath sounds.  A rapid heartbeat.  Various tests may be ordered. These may include:   Chest X-ray.    CT scan.    Swallowing study. This test looks at how food is swallowed and whether it goes into your  breathing tube (trachea) or food pipe (esophagus).    Sputum culture. Saliva and mucus (sputum) are collected from the lungs or the tubes that carry air to the lungs (bronchi). The sputum is then tested for bacteria.    Bronchoscopy. This test uses a flexible tube (bronchoscope) to see inside the lungs.  TREATMENT   Treatment will usually include antibiotic medicines. Other medicines may also be used to reduce fever or pain. You may need to be treated in the hospital. In the hospital, your breathing will be carefully monitored. Depending on how well you are breathing, you may need to be given oxygen, or you may need breathing support from a breathing machine (ventilator). For people who fail a swallowing study, a feeding tube might be placed in the stomach, or they may be asked to avoid certain food textures or liquids when they eat.  HOME CARE INSTRUCTIONS   Carefully follow any special eating instructions you were given, such as avoiding certain food textures or thickening liquids. This reduces the risk of developing aspiration pneumonia again.   Only take over-the-counter or prescription medicines as directed by your health care provider. Follow the directions carefully.    If you were prescribed antibiotics, take them as directed. Finish them even if you start to feel better.    Rest as instructed by your health care provider.    Keep all follow-up appointments with your health care provider.    SEEK MEDICAL CARE IF:   You develop worsening shortness of breath, wheezing, or difficulty breathing.    You develop a fever.    You have chest pain.    MAKE SURE YOU:   Understand these instructions.  Will watch your condition.  Will get help right away if you are not doing well or get worse.     This information is not intended to replace advice given to you by your health care provider. Make sure you discuss any questions you have with your health care provider.     Document Released: 10/15/2010 Document Revised:  12/23/2014 Document Reviewed: 06/05/2014  [employer] Interactive Patient Education ©2016 [employer] Inc.

## 2017-07-12 NOTE — PROGRESS NOTES
Received report and assumed care at 1900: Patient is a lot more pleasantly awake today. He still is AAO x 1-2. Patient incontinent of urine and feces. He sometimes tries to use to urinal. He is 1500 on the IS but needs coaching as sometimes he licks it instead of using it as a breathing exercise. He is still pretty stiff in bed and dangles to one side. Bed locked and in low position. Call light within reach. Hourly rounding in place.

## 2017-07-12 NOTE — CARE PLAN
Problem: Respiratory:  Goal: Respiratory status will improve  Intervention: Educate and encourage coughing and deep breathing  Educate patient and remind him to cough and deep breath as needed.       Problem: Pain Management  Goal: Pain level will decrease to patient’s comfort goal  Intervention: Follow pain managment plan developed in collaboration with patient and Interdisciplinary Team  Medicate per MAR as needed. Re assess for pain management effectiveness.

## 2017-07-13 NOTE — FACE TO FACE
Face to Face Note  -  Durable Medical Equipment    Leeanne Lopez M.D. - NPI: [id number]  I certify that this patient is under my care and that they had a durable medical equipment(DME)face to face encounter by myself that meets the physician DME face-to-face encounter requirements with this patient on:    Date of encounter:   Patient:                    MRN:                       YOB: 2015  aXvier Bynum  [id number]  1940     The encounter with the patient was in whole, or in part, for the following medical condition, which is the primary reason for durable medical equipment:  Other - alcohol abuse and encephalopathy    I certify that, based on my findings, the following durable medical equipment is medically necessary:  Wheel Chair and 3 in 1 Bedside [place].    HOME O2 Saturation Measurements:(Values must be present for Home Oxygen orders)         ,     ,         My Clinical findings support the need for the above equipment due to:  Abnormal Gait    Supporting Symptoms: none

## 2017-07-20 LAB
FUNGUS SPEC CULT: NORMAL
SIGNIFICANT IND 70042: NORMAL
SITE SITE: NORMAL
SOURCE SOURCE: NORMAL

## 2017-08-18 LAB
MYCOBACTERIUM SPEC CULT: NORMAL
RHODAMINE-AURAMINE STN SPEC: NORMAL
SIGNIFICANT IND 70042: NORMAL
SITE SITE: NORMAL
SOURCE SOURCE: NORMAL